# Patient Record
Sex: FEMALE | Race: WHITE | NOT HISPANIC OR LATINO | Employment: PART TIME | URBAN - NONMETROPOLITAN AREA
[De-identification: names, ages, dates, MRNs, and addresses within clinical notes are randomized per-mention and may not be internally consistent; named-entity substitution may affect disease eponyms.]

---

## 2019-11-05 ENCOUNTER — OFFICE VISIT (OUTPATIENT)
Dept: FAMILY MEDICINE CLINIC | Facility: CLINIC | Age: 69
End: 2019-11-05
Payer: COMMERCIAL

## 2019-11-05 VITALS
DIASTOLIC BLOOD PRESSURE: 80 MMHG | WEIGHT: 173 LBS | HEIGHT: 63 IN | SYSTOLIC BLOOD PRESSURE: 138 MMHG | BODY MASS INDEX: 30.65 KG/M2

## 2019-11-05 DIAGNOSIS — Z12.31 ENCOUNTER FOR SCREENING MAMMOGRAM FOR BREAST CANCER: Primary | ICD-10-CM

## 2019-11-05 DIAGNOSIS — Z23 NEEDS FLU SHOT: ICD-10-CM

## 2019-11-05 DIAGNOSIS — Z12.11 SCREENING FOR MALIGNANT NEOPLASM OF COLON: ICD-10-CM

## 2019-11-05 DIAGNOSIS — J30.1 SEASONAL ALLERGIC RHINITIS DUE TO POLLEN: ICD-10-CM

## 2019-11-05 DIAGNOSIS — I10 ESSENTIAL HYPERTENSION: ICD-10-CM

## 2019-11-05 PROBLEM — M79.641 RIGHT HAND PAIN: Chronic | Status: ACTIVE | Noted: 2019-11-05

## 2019-11-05 PROBLEM — K21.00 GASTRO-ESOPHAGEAL REFLUX DISEASE WITH ESOPHAGITIS: Chronic | Status: ACTIVE | Noted: 2019-11-05

## 2019-11-05 PROBLEM — M70.41 PREPATELLAR BURSITIS OF RIGHT KNEE: Status: ACTIVE | Noted: 2019-11-05

## 2019-11-05 PROCEDURE — G0008 ADMIN INFLUENZA VIRUS VAC: HCPCS | Performed by: FAMILY MEDICINE

## 2019-11-05 PROCEDURE — 90682 RIV4 VACC RECOMBINANT DNA IM: CPT | Performed by: FAMILY MEDICINE

## 2019-11-05 PROCEDURE — 99214 OFFICE O/P EST MOD 30 MIN: CPT | Performed by: FAMILY MEDICINE

## 2019-11-05 RX ORDER — MONTELUKAST SODIUM 10 MG/1
10 TABLET ORAL DAILY
Qty: 90 TABLET | Refills: 3 | Status: SHIPPED | OUTPATIENT
Start: 2019-11-05 | End: 2020-04-16

## 2019-11-05 RX ORDER — MONTELUKAST SODIUM 10 MG/1
10 TABLET ORAL DAILY
Refills: 2 | COMMUNITY
Start: 2019-10-16 | End: 2019-11-05 | Stop reason: SDUPTHER

## 2019-11-05 RX ORDER — TRIAMTERENE AND HYDROCHLOROTHIAZIDE 37.5; 25 MG/1; MG/1
1 TABLET ORAL DAILY
Refills: 2 | COMMUNITY
Start: 2019-10-16 | End: 2019-11-05 | Stop reason: SDUPTHER

## 2019-11-05 RX ORDER — TRIAMTERENE AND HYDROCHLOROTHIAZIDE 37.5; 25 MG/1; MG/1
1 TABLET ORAL DAILY
Qty: 90 TABLET | Refills: 3 | Status: SHIPPED | OUTPATIENT
Start: 2019-11-05 | End: 2020-12-09 | Stop reason: SDUPTHER

## 2019-11-05 NOTE — PATIENT INSTRUCTIONS
Overall seems to be doing very well  I did advise not to kneel on the right knee but prefers should probably not kneel on the left knee either because of the prepatellar bursitis  Try to avoid repetitive activity with the right thumb to help with the pain at the base of the 1st metacarpal   Will give flu shot today for routine care    Will check renal panel for history of hypertension

## 2019-11-05 NOTE — ASSESSMENT & PLAN NOTE
Overall doing well continue current regimen watch salt in diet and push for weight loss    Will check renal panel

## 2019-11-05 NOTE — PROGRESS NOTES
Assessment/Plan:    Essential hypertension  Overall doing well continue current regimen watch salt in diet and push for weight loss  Will check renal panel    Gastro-esophageal reflux disease with esophagitis  Overall doing well is currently taking the Prilosec every other day    Seasonal allergic rhinitis due to pollen  Overall doing well and is currently not having any symptoms when in [place]  Continue current regimen    Right hand pain  Try to avoid repetitive activity with the right thumb    Prepatellar bursitis of right knee  This looks to be doing well I advised her just to avoid kneeling on either knee       Diagnoses and all orders for this visit:    Encounter for screening mammogram for breast cancer  -     Mammo screening bilateral w 3d & cad; Future    Screening for malignant neoplasm of colon  -     Ambulatory referral to Gastroenterology; Future    Essential hypertension  -     triamterene-hydrochlorothiazide (MAXZIDE-25) 37 5-25 mg per tablet; Take 1 tablet by mouth daily  -     Renal function panel; Future    Seasonal allergic rhinitis due to pollen  -     montelukast (SINGULAIR) 10 mg tablet; Take 1 tablet (10 mg total) by mouth daily    Needs flu shot  -     influenza vaccine, [id number], quadrivalent, recombinant, PF, 0 5 mL, for patients 18 yr+ (FLUBLOK)    Other orders  -     Discontinue: montelukast (SINGULAIR) 10 mg tablet; Take 10 mg by mouth daily  -     Discontinue: triamterene-hydrochlorothiazide (MAXZIDE-25) 37 5-25 mg per tablet; Take 1 tablet by mouth daily          Subjective:      Patient ID: Azul Rock is a 81 y o  female  Patient has history of hypertension, allergic rhinitis and reflux esophagitis    Has been having trouble with pain at the base of the right thumb area also had fallen and had severe swelling above the right knee cap which had resolved but is still intermittently painful if kneeling on area      The following portions of the patient's history were reviewed and updated as appropriate: allergies, current medications, past medical history, past social history and problem list     Review of Systems   Constitutional: Negative for activity change, appetite change, chills, fatigue, fever and unexpected weight change  HENT: Negative for congestion, rhinorrhea, trouble swallowing and voice change  Eyes: Negative for visual disturbance  Respiratory: Negative for apnea, cough, chest tightness and shortness of breath  Cardiovascular: Negative for chest pain, palpitations and leg swelling  Gastrointestinal: Negative for abdominal distention, abdominal pain, constipation and diarrhea  Endocrine: Negative for polyuria  Genitourinary: Negative for difficulty urinating and enuresis  Musculoskeletal: Positive for arthralgias (But has tenderness if kneeling on the right knee and pain at the base of the right thumb above the wrist with certain activity)  Negative for joint swelling and myalgias  Skin: Negative for rash  Allergic/Immunologic: Positive for environmental allergies  Neurological: Negative for dizziness, weakness, light-headedness, numbness and headaches  Hematological: Negative for adenopathy  Psychiatric/Behavioral: Negative for agitation  Objective:      /80 (BP Location: Left arm, Patient Position: Sitting, Cuff Size: Standard)   Ht 5' 3" (1 6 m)   Wt 78 5 kg (173 lb)   BMI 30 65 kg/m²          Physical Exam   Constitutional: She appears well-developed and well-nourished  HENT:   Head: Normocephalic  Nose: Nose normal    Mouth/Throat: Oropharynx is clear and moist    Eyes: Conjunctivae are normal    Neck: Neck supple  No thyromegaly present  Cardiovascular: Normal rate, regular rhythm and normal heart sounds  No murmur (Rate is 66 no bruits are noted) heard  Pulmonary/Chest: Effort normal and breath sounds normal    Abdominal: Soft  She exhibits no distension and no mass  There is no tenderness     Musculoskeletal: She exhibits no edema  Arms:       Legs:  Lymphadenopathy:     She has no cervical adenopathy  Neurological: She is alert  She displays normal reflexes  Coordination normal    Skin: Skin is warm and dry  Psychiatric: She has a normal mood and affect  Nursing note and vitals reviewed

## 2019-11-05 NOTE — ASSESSMENT & PLAN NOTE
Overall doing well and is currently not having any symptoms when in [place]    Continue current regimen

## 2019-12-08 ENCOUNTER — APPOINTMENT (INPATIENT)
Dept: CT IMAGING | Facility: HOSPITAL | Age: 69
DRG: 470 | End: 2019-12-08
Payer: COMMERCIAL

## 2019-12-08 ENCOUNTER — HOSPITAL ENCOUNTER (INPATIENT)
Facility: HOSPITAL | Age: 69
LOS: 3 days | Discharge: HOME HEALTH | DRG: 470 | End: 2019-12-11
Attending: FAMILY MEDICINE | Admitting: FAMILY MEDICINE
Payer: COMMERCIAL

## 2019-12-08 ENCOUNTER — APPOINTMENT (EMERGENCY)
Dept: RADIOLOGY | Facility: HOSPITAL | Age: 69
DRG: 470 | End: 2019-12-08
Payer: COMMERCIAL

## 2019-12-08 ENCOUNTER — APPOINTMENT (INPATIENT)
Dept: RADIOLOGY | Facility: HOSPITAL | Age: 69
DRG: 470 | End: 2019-12-08
Payer: COMMERCIAL

## 2019-12-08 DIAGNOSIS — D62 ACUTE BLOOD LOSS ANEMIA: ICD-10-CM

## 2019-12-08 DIAGNOSIS — S72.001A CLOSED FRACTURE OF RIGHT HIP, INITIAL ENCOUNTER (HCC): ICD-10-CM

## 2019-12-08 DIAGNOSIS — S72.001A CLOSED FRACTURE OF NECK OF RIGHT FEMUR, INITIAL ENCOUNTER (HCC): Primary | ICD-10-CM

## 2019-12-08 PROBLEM — Z87.81 S/P RIGHT HIP FRACTURE: Status: ACTIVE | Noted: 2019-12-08

## 2019-12-08 PROBLEM — Z87.81 S/P RIGHT HIP FRACTURE: Status: RESOLVED | Noted: 2019-12-08 | Resolved: 2019-12-08

## 2019-12-08 LAB
ABO GROUP BLD: NORMAL
ALBUMIN SERPL BCP-MCNC: 4 G/DL (ref 3.5–5)
ALP SERPL-CCNC: 133 U/L (ref 46–116)
ALT SERPL W P-5'-P-CCNC: 44 U/L (ref 12–78)
ANION GAP SERPL CALCULATED.3IONS-SCNC: 8 MMOL/L (ref 4–13)
APTT PPP: 26 SECONDS (ref 23–37)
AST SERPL W P-5'-P-CCNC: 30 U/L (ref 5–45)
BASOPHILS # BLD AUTO: 0.03 THOUSANDS/ΜL (ref 0–0.1)
BASOPHILS NFR BLD AUTO: 0 % (ref 0–1)
BILIRUB SERPL-MCNC: 0.32 MG/DL (ref 0.2–1)
BILIRUB UR QL STRIP: NEGATIVE
BLD GP AB SCN SERPL QL: NEGATIVE
BUN SERPL-MCNC: 21 MG/DL (ref 5–25)
CALCIUM SERPL-MCNC: 9.5 MG/DL (ref 8.3–10.1)
CHLORIDE SERPL-SCNC: 99 MMOL/L (ref 100–108)
CLARITY UR: CLEAR
CO2 SERPL-SCNC: 30 MMOL/L (ref 21–32)
COLOR UR: YELLOW
CREAT SERPL-MCNC: 0.86 MG/DL (ref 0.6–1.3)
EOSINOPHIL # BLD AUTO: 0.07 THOUSAND/ΜL (ref 0–0.61)
EOSINOPHIL NFR BLD AUTO: 1 % (ref 0–6)
ERYTHROCYTE [DISTWIDTH] IN BLOOD BY AUTOMATED COUNT: 11.7 % (ref 11.6–15.1)
GFR SERPL CREATININE-BSD FRML MDRD: 70 ML/MIN/1.73SQ M
GLUCOSE SERPL-MCNC: 110 MG/DL (ref 65–140)
GLUCOSE UR STRIP-MCNC: NEGATIVE MG/DL
HCT VFR BLD AUTO: 40.8 % (ref 34.8–46.1)
HGB BLD-MCNC: 14.1 G/DL (ref 11.5–15.4)
HGB UR QL STRIP.AUTO: NEGATIVE
IMM GRANULOCYTES # BLD AUTO: 0.09 THOUSAND/UL (ref 0–0.2)
IMM GRANULOCYTES NFR BLD AUTO: 1 % (ref 0–2)
INR PPP: 0.97 (ref 0.84–1.19)
KETONES UR STRIP-MCNC: NEGATIVE MG/DL
LEUKOCYTE ESTERASE UR QL STRIP: NEGATIVE
LYMPHOCYTES # BLD AUTO: 1.97 THOUSANDS/ΜL (ref 0.6–4.47)
LYMPHOCYTES NFR BLD AUTO: 24 % (ref 14–44)
MCH RBC QN AUTO: 29.9 PG (ref 26.8–34.3)
MCHC RBC AUTO-ENTMCNC: 34.6 G/DL (ref 31.4–37.4)
MCV RBC AUTO: 87 FL (ref 82–98)
MONOCYTES # BLD AUTO: 0.35 THOUSAND/ΜL (ref 0.17–1.22)
MONOCYTES NFR BLD AUTO: 4 % (ref 4–12)
NEUTROPHILS # BLD AUTO: 5.67 THOUSANDS/ΜL (ref 1.85–7.62)
NEUTS SEG NFR BLD AUTO: 70 % (ref 43–75)
NITRITE UR QL STRIP: NEGATIVE
NRBC BLD AUTO-RTO: 0 /100 WBCS
PH UR STRIP.AUTO: 5 [PH]
PLATELET # BLD AUTO: 285 THOUSANDS/UL (ref 149–390)
PLATELET # BLD AUTO: 286 THOUSANDS/UL (ref 149–390)
PMV BLD AUTO: 10.4 FL (ref 8.9–12.7)
PMV BLD AUTO: 10.5 FL (ref 8.9–12.7)
POTASSIUM SERPL-SCNC: 3.3 MMOL/L (ref 3.5–5.3)
PROT SERPL-MCNC: 7.8 G/DL (ref 6.4–8.2)
PROT UR STRIP-MCNC: NEGATIVE MG/DL
PROTHROMBIN TIME: 12.9 SECONDS (ref 11.6–14.5)
RBC # BLD AUTO: 4.71 MILLION/UL (ref 3.81–5.12)
RH BLD: POSITIVE
SODIUM SERPL-SCNC: 137 MMOL/L (ref 136–145)
SP GR UR STRIP.AUTO: 1.02 (ref 1–1.03)
SPECIMEN EXPIRATION DATE: NORMAL
UROBILINOGEN UR QL STRIP.AUTO: 0.2 E.U./DL
WBC # BLD AUTO: 8.18 THOUSAND/UL (ref 4.31–10.16)

## 2019-12-08 PROCEDURE — 99285 EMERGENCY DEPT VISIT HI MDM: CPT | Performed by: EMERGENCY MEDICINE

## 2019-12-08 PROCEDURE — 70450 CT HEAD/BRAIN W/O DYE: CPT

## 2019-12-08 PROCEDURE — 86900 BLOOD TYPING SEROLOGIC ABO: CPT | Performed by: PHYSICIAN ASSISTANT

## 2019-12-08 PROCEDURE — 36415 COLL VENOUS BLD VENIPUNCTURE: CPT | Performed by: PHYSICIAN ASSISTANT

## 2019-12-08 PROCEDURE — 93005 ELECTROCARDIOGRAM TRACING: CPT

## 2019-12-08 PROCEDURE — 72170 X-RAY EXAM OF PELVIS: CPT

## 2019-12-08 PROCEDURE — 86901 BLOOD TYPING SEROLOGIC RH(D): CPT | Performed by: PHYSICIAN ASSISTANT

## 2019-12-08 PROCEDURE — 71046 X-RAY EXAM CHEST 2 VIEWS: CPT

## 2019-12-08 PROCEDURE — 99285 EMERGENCY DEPT VISIT HI MDM: CPT

## 2019-12-08 PROCEDURE — 85610 PROTHROMBIN TIME: CPT | Performed by: PHYSICIAN ASSISTANT

## 2019-12-08 PROCEDURE — 86850 RBC ANTIBODY SCREEN: CPT | Performed by: PHYSICIAN ASSISTANT

## 2019-12-08 PROCEDURE — 85730 THROMBOPLASTIN TIME PARTIAL: CPT | Performed by: PHYSICIAN ASSISTANT

## 2019-12-08 PROCEDURE — 99222 1ST HOSP IP/OBS MODERATE 55: CPT | Performed by: FAMILY MEDICINE

## 2019-12-08 PROCEDURE — 73552 X-RAY EXAM OF FEMUR 2/>: CPT

## 2019-12-08 PROCEDURE — 81003 URINALYSIS AUTO W/O SCOPE: CPT | Performed by: PHYSICIAN ASSISTANT

## 2019-12-08 PROCEDURE — 73700 CT LOWER EXTREMITY W/O DYE: CPT

## 2019-12-08 PROCEDURE — 80053 COMPREHEN METABOLIC PANEL: CPT | Performed by: PHYSICIAN ASSISTANT

## 2019-12-08 PROCEDURE — 85025 COMPLETE CBC W/AUTO DIFF WBC: CPT | Performed by: PHYSICIAN ASSISTANT

## 2019-12-08 PROCEDURE — 85049 AUTOMATED PLATELET COUNT: CPT | Performed by: FAMILY MEDICINE

## 2019-12-08 RX ORDER — CHOLECALCIFEROL (VITAMIN D3) 125 MCG
500 CAPSULE ORAL DAILY
COMMUNITY

## 2019-12-08 RX ORDER — OMEPRAZOLE 20 MG/1
20 CAPSULE, DELAYED RELEASE ORAL DAILY
COMMUNITY
End: 2021-01-18

## 2019-12-08 RX ORDER — MONTELUKAST SODIUM 10 MG/1
10 TABLET ORAL DAILY
Status: DISCONTINUED | OUTPATIENT
Start: 2019-12-08 | End: 2019-12-11 | Stop reason: HOSPADM

## 2019-12-08 RX ORDER — MAGNESIUM HYDROXIDE/ALUMINUM HYDROXICE/SIMETHICONE 120; 1200; 1200 MG/30ML; MG/30ML; MG/30ML
30 SUSPENSION ORAL EVERY 6 HOURS PRN
Status: DISCONTINUED | OUTPATIENT
Start: 2019-12-08 | End: 2019-12-11 | Stop reason: HOSPADM

## 2019-12-08 RX ORDER — OXYCODONE HYDROCHLORIDE 5 MG/1
5 TABLET ORAL EVERY 4 HOURS PRN
Status: DISCONTINUED | OUTPATIENT
Start: 2019-12-08 | End: 2019-12-11 | Stop reason: HOSPADM

## 2019-12-08 RX ORDER — MONTELUKAST SODIUM 10 MG/1
10 TABLET ORAL DAILY
Status: DISCONTINUED | OUTPATIENT
Start: 2019-12-08 | End: 2019-12-08

## 2019-12-08 RX ORDER — ACETAMINOPHEN 325 MG/1
975 TABLET ORAL EVERY 8 HOURS SCHEDULED
Status: DISCONTINUED | OUTPATIENT
Start: 2019-12-08 | End: 2019-12-11 | Stop reason: HOSPADM

## 2019-12-08 RX ORDER — OXYCODONE HYDROCHLORIDE 5 MG/1
2.5 TABLET ORAL EVERY 4 HOURS PRN
Status: DISCONTINUED | OUTPATIENT
Start: 2019-12-08 | End: 2019-12-11 | Stop reason: HOSPADM

## 2019-12-08 RX ORDER — SODIUM CHLORIDE 9 MG/ML
75 INJECTION, SOLUTION INTRAVENOUS CONTINUOUS
Status: DISCONTINUED | OUTPATIENT
Start: 2019-12-08 | End: 2019-12-09

## 2019-12-08 RX ORDER — POTASSIUM CHLORIDE 20 MEQ/1
40 TABLET, EXTENDED RELEASE ORAL ONCE
Status: COMPLETED | OUTPATIENT
Start: 2019-12-08 | End: 2019-12-08

## 2019-12-08 RX ADMIN — MONTELUKAST 10 MG: 10 TABLET, FILM COATED ORAL at 20:21

## 2019-12-08 RX ADMIN — ENOXAPARIN SODIUM 40 MG: 40 INJECTION SUBCUTANEOUS at 17:52

## 2019-12-08 RX ADMIN — SODIUM CHLORIDE 75 ML/HR: 0.9 INJECTION, SOLUTION INTRAVENOUS at 17:52

## 2019-12-08 RX ADMIN — ACETAMINOPHEN 975 MG: 325 TABLET ORAL at 17:51

## 2019-12-08 RX ADMIN — OXYCODONE HYDROCHLORIDE 2.5 MG: 5 TABLET ORAL at 20:21

## 2019-12-08 RX ADMIN — POTASSIUM CHLORIDE 40 MEQ: 1500 TABLET, EXTENDED RELEASE ORAL at 17:51

## 2019-12-08 NOTE — H&P
H&P- Kervin Flores 1950, 81 y o  female MRN: [id number]    Unit/Bed#: -01 Encounter: [id number]    Primary Care Provider: Akhil Vang MD   Date and time admitted to hospital: 12/8/2019 12:59 PM        * Closed right hip fracture [facility])  Assessment & Plan  · Patient had a mechanical fall today  · Right hip fracture-femoral neck  · Emergency room discussed with Orthopedics-NPO after midnight  · Pain control  · Orthopedic consultation  · For surgical intervention tomorrow    Essential hypertension  Assessment & Plan  · Patient is on  maxide as outpatient  · Will hold for the time being since the patient is going to  OR  · Monitor blood pressure      VTE Prophylaxis: Enoxaparin (Lovenox)  / sequential compression device   Code Status: full code  POLST: POLST form is not discussed and not completed at this time  Discussion with family: patient    Anticipated Length of Stay:  Patient will be admitted on an Inpatient basis with an anticipated length of stay of  >2 midnights  Justification for Hospital Stay:     Total Time for Visit, including Counseling / Coordination of Care: 70  minutes  Greater than 50% of this total time spent on direct patient counseling and coordination of care  Chief Complaint:       History of Present Illness:    Kervin Flores is a 81 y o  female who presents with right hip pain after a fall  Patient reported that she was tick rating chest and she was up and a ladder  She was coming down from the ladder and she lost balance  She thinks she may have missed a step of the ladder and then she fell down to the right hip  Showed reported that she fell down onto the tired floor and ever since she has been having pain in her right hip  She is not able to ambulate  Denies any loss of consciousness or head injury  No shortness of breath no chest pain before the fall     No dizziness or lightheadedness     Emergency room had an x-ray showing the presence of right hip fracture and discussed with Orthopedics who recommended admission to medicine and keeping NPO after midnight  Review of Systems:    Review of Systems   Constitutional: Positive for activity change  HENT: Negative  Respiratory: Negative  Cardiovascular: Negative  Gastrointestinal: Negative  Genitourinary: Negative  Musculoskeletal: Positive for arthralgias and gait problem  Hematological: Negative  Psychiatric/Behavioral: Negative  Past Medical and Surgical History:     Past Medical History:   Diagnosis Date    Essential hypertension     Hyperlipidemia        Past Surgical History:   Procedure Laterality Date    BASAL CELL CARCINOMA EXCISION      BREAST SURGERY      Biopsy     HERNIA REPAIR         Meds/Allergies:    Prior to Admission medications    Medication Sig Start Date End Date Taking? Authorizing Provider   montelukast (SINGULAIR) 10 mg tablet Take 1 tablet (10 mg total) by mouth daily 11/5/19  Yes Abby Fernandez MD   triamterene-hydrochlorothiazide [facility]) 37 5-25 mg per tablet Take 1 tablet by mouth daily 11/5/19  Yes Abby Fernandez MD     I have reviewed home medications with patient personally  Allergies:    Allergies   Allergen Reactions    Codeine Hives       Social History:     Marital Status: /Civil Union   Occupation: retired   Patient Pre-hospital Living Situation:   Patient Pre-hospital Level of Mobility:   Patient Pre-hospital Diet Restrictions:   Substance Use History:   Social History     Substance and Sexual Activity   Alcohol Use Yes    Comment: Occasionally      Social History     Tobacco Use   Smoking Status Never Smoker   Smokeless Tobacco Never Used     Social History     Substance and Sexual Activity   Drug Use Never       Family History:    Family History   Problem Relation Age of Onset    Colon cancer Paternal Grandmother     Diabetes Mother     Hypertension Mother     Coronary artery disease Mother     Coronary artery disease Father        Physical Exam:     Vitals:   Blood Pressure: 170/94 (12/08/19 1548)  Pulse: 88 (12/08/19 1548)  Temperature: 98 2 °F (36 8 °C) (12/08/19 1548)  Temp Source: Oral (12/08/19 1302)  Respirations: 18 (12/08/19 1548)  Height: 5' 5" (165 1 cm) (12/08/19 1302)  Weight - Scale: 78 3 kg (172 lb 9 9 oz) (12/08/19 1302)  SpO2: 94 % (12/08/19 1548)    Physical Exam   Constitutional: No distress  HENT:   Head: Normocephalic and atraumatic  Eyes: Right eye exhibits no discharge  Left eye exhibits no discharge  Neck: No JVD present  Cardiovascular: Normal rate  No murmur heard  Pulmonary/Chest: No stridor  No respiratory distress  Abdominal: Soft  Bowel sounds are normal  She exhibits no distension  Musculoskeletal: Normal range of motion  She exhibits tenderness and deformity  She exhibits no edema  Tenderness on palpation of the right hip  Right lower extremity shorter   Neurological: No cranial nerve deficit  Skin: Skin is warm  She is not diaphoretic  Additional Data:     Lab Results: I have personally reviewed pertinent reports  Results from last 7 days   Lab Units 12/08/19  1403   WBC Thousand/uL 8 18   HEMOGLOBIN g/dL 14 1   HEMATOCRIT % 40 8   PLATELETS Thousands/uL 285   NEUTROS PCT % 70   LYMPHS PCT % 24   MONOS PCT % 4   EOS PCT % 1     Results from last 7 days   Lab Units 12/08/19  1403   SODIUM mmol/L 137   POTASSIUM mmol/L 3 3*   CHLORIDE mmol/L 99*   CO2 mmol/L 30   BUN mg/dL 21   CREATININE mg/dL 0 86   ANION GAP mmol/L 8   CALCIUM mg/dL 9 5   ALBUMIN g/dL 4 0   TOTAL BILIRUBIN mg/dL 0 32   ALK PHOS U/L 133*   ALT U/L 44   AST U/L 30   GLUCOSE RANDOM mg/dL 110     Results from last 7 days   Lab Units 12/08/19  1403   INR  0 97                   Imaging: I have personally reviewed pertinent reports  CT head without contrast   Final Result by Alysa Evangelista MD (12/08 7964)      No acute intracranial abnormality                    Workstation performed: [id number]         CT hip right without contrast   Final Result by Sara Macedo MD (12/08 1440)      Comminuted slightly impacted right subcapital hip fracture including mild anterior angulation at the fracture site  No additional fracture identified  Workstation performed: [id number]         XR femur 2 views RIGHT    (Results Pending)   XR pelvis ap only 1 or 2 vw    (Results Pending)   XR chest 2 views    (Results Pending)       EKG, Pathology, and Other Studies Reviewed on Admission:   · EKG:     Allscripts / Epic Records Reviewed: Yes     ** Please Note: This note has been constructed using a voice recognition system   **

## 2019-12-08 NOTE — ED PROVIDER NOTES
History  Chief Complaint   Patient presents with    Hip Injury     Pt was stepping down off a ladder when she fell on her R hip, -headstrike -LOC -thinners  Pt was unable to get up off floor on her own and was unable to bear weight on the R leg  82-year-old female with a history of hypertension presents to the emergency department for evaluation of a fall and right hip pain  The patient reports that just prior to arrival, she was at [facility] and decorating whenever she had just stepped off a ladder, and was turning around and had a mechanical fall  The patient reports that she did not develop any lightheadedness, dizziness, chest pain, palpitations, shortness of breath, nausea, abdominal pain, or any other symptoms preceding her fall  The patient states that her fall was strictly mechanical   The patient fell onto her right side and believes that she landed on her right hip  She denies hitting her head and denies any headache, vision changes, neck pain, abdominal pain, nausea, vomiting, loss of bladder or bowel control, paresthesias, weakness, or any other symptoms since the fall  She did not try to walk after the incident and the ambulance was called immediately, bring her to the emergency department for further evaluation  Since the fall, the patient has noted some pain around her right hip  She denies any other complaints or symptoms at this time  History provided by:  Patient   used: No    Fall   Mechanism of injury: fall    Injury location:  Pelvis  Pelvic injury location:  R hip  Incident location: [facility]  Time since incident:  2 hours  Arrived directly from scene: yes    Fall:     Fall occurred:  Walking    Height of fall:  From standing    Impact surface:  Hard floor    Point of impact: R side      Entrapped after fall: no    Suspicion of alcohol use: no    Suspicion of drug use: no    Tetanus status:  Unknown  Prior to arrival data:     Bystander interventions:  None Patient ambulatory at scene: no      Blood loss:  None    Responsiveness at scene:  Alert    Orientation at scene:  Person, place, situation and time    Loss of consciousness: no      Amnesic to event: no      Airway interventions:  None  Associated symptoms: no abdominal pain, no back pain, no chest pain, no headaches, no nausea, no neck pain and no vomiting        Prior to Admission Medications   Prescriptions Last Dose Informant Patient Reported? Taking?   montelukast (SINGULAIR) 10 mg tablet 12/7/2019 at Unknown time  No Yes   Sig: Take 1 tablet (10 mg total) by mouth daily   triamterene-hydrochlorothiazide (MAXZIDE-25) 37 5-25 mg per tablet 12/7/2019 at Unknown time  No Yes   Sig: Take 1 tablet by mouth daily      Facility-Administered Medications: None       Past Medical History:   Diagnosis Date    Essential hypertension     Hyperlipidemia        Past Surgical History:   Procedure Laterality Date    BASAL CELL CARCINOMA EXCISION      BREAST SURGERY      Biopsy     HERNIA REPAIR         Family History   Problem Relation Age of Onset    Colon cancer Paternal Grandmother     Diabetes Mother     Hypertension Mother     Coronary artery disease Mother     Coronary artery disease Father      I have reviewed and agree with the history as documented  Social History     Tobacco Use    Smoking status: Never Smoker    Smokeless tobacco: Never Used   Substance Use Topics    Alcohol use: Yes     Comment: Occasionally     Drug use: Never        Review of Systems   Constitutional: Negative for chills and fever  HENT: Negative for congestion and rhinorrhea  Eyes: Negative for photophobia and visual disturbance  Respiratory: Negative for chest tightness and shortness of breath  Cardiovascular: Negative for chest pain and palpitations  Gastrointestinal: Negative for abdominal pain, nausea and vomiting  Genitourinary: Negative for difficulty urinating and dysuria          Negative for urinary incontinence   Musculoskeletal: Positive for arthralgias (Right hip pain)  Negative for back pain, neck pain and neck stiffness  Skin: Negative for rash and wound  Neurological: Negative for dizziness, weakness, light-headedness, numbness and headaches  Physical Exam  Physical Exam   Constitutional: She is oriented to person, place, and time  She appears well-developed and well-nourished  She appears distressed (Mild distress)  HENT:   Head: Normocephalic and atraumatic  Mouth/Throat: Oropharynx is clear and moist  No oropharyngeal exudate  Eyes: Pupils are equal, round, and reactive to light  EOM are normal    Neck: Normal range of motion  Neck supple  No cervical spine tenderness   Cardiovascular: Normal rate and regular rhythm  No murmur heard  Pulmonary/Chest: Effort normal and breath sounds normal  No stridor  No respiratory distress  She has no wheezes  She has no rales  She exhibits no tenderness  Abdominal: Soft  She exhibits no distension  There is no tenderness  There is no guarding  Musculoskeletal: She exhibits no tenderness or deformity  There is no reproducible tenderness of the greater trochanter of the right hip  There is no pain through flexion of the 1st 45° of the right hip, however beyond 45°, the patient does experience some pain  She also has subjective pain with internal and external rotation  The right lower extremity is not shortened or rotated at rest   Neurovascular status intact, pedal pulses 2+  The patient demonstrates good effort with active hip flexion, however this is somewhat limited by pain   Neurological: She is alert and oriented to person, place, and time  She has normal strength  No cranial nerve deficit or sensory deficit  She exhibits normal muscle tone  GCS eye subscore is 4  GCS verbal subscore is 5  GCS motor subscore is 6  Skin: Skin is warm  No rash noted  She is not diaphoretic  No erythema     Psychiatric: She has a normal mood and affect   Her behavior is normal        Vital Signs  ED Triage Vitals [12/08/19 1302]   Temperature Pulse Respirations Blood Pressure SpO2   97 8 °F (36 6 °C) 72 20 (!) 171/90 98 %      Temp Source Heart Rate Source Patient Position - Orthostatic VS BP Location FiO2 (%)   Oral Monitor Lying Right arm --      Pain Score       Worst Possible Pain           Vitals:    12/08/19 1302   BP: (!) 171/90   Pulse: 72   Patient Position - Orthostatic VS: Lying         Visual Acuity      ED Medications  Medications   potassium chloride (K-DUR,KLOR-CON) CR tablet 40 mEq (has no administration in time range)       Diagnostic Studies  Results Reviewed     Procedure Component Value Units Date/Time    Comprehensive metabolic panel [[id number]]  (Abnormal) Collected:  12/08/19 1403    Lab Status:  Final result Specimen:  Blood from Arm, Right Updated:  12/08/19 1428     Sodium 137 mmol/L      Potassium 3 3 mmol/L      Chloride 99 mmol/L      CO2 30 mmol/L      ANION GAP 8 mmol/L      BUN 21 mg/dL      Creatinine 0 86 mg/dL      Glucose 110 mg/dL      Calcium 9 5 mg/dL      AST 30 U/L      ALT 44 U/L      Alkaline Phosphatase 133 U/L      Total Protein 7 8 g/dL      Albumin 4 0 g/dL      Total Bilirubin 0 32 mg/dL      eGFR 70 ml/min/1 73sq m     Narrative:       [place] guidelines for Chronic Kidney Disease (CKD):     Stage 1 with normal or high GFR (GFR > 90 mL/min/1 73 square meters)    Stage 2 Mild CKD (GFR = 60-89 mL/min/1 73 square meters)    Stage 3A Moderate CKD (GFR = 45-59 mL/min/1 73 square meters)    Stage 3B Moderate CKD (GFR = 30-44 mL/min/1 73 square meters)    Stage 4 Severe CKD (GFR = 15-29 mL/min/1 73 square meters)    Stage 5 End Stage CKD (GFR <15 mL/min/1 73 square meters)  Note: GFR calculation is accurate only with a steady state creatinine    Protime-INR [[id number]]  (Normal) Collected:  12/08/19 1403    Lab Status:  Final result Specimen:  Blood from Arm, Right Updated:  12/08/19 1421     Protime 12 9 seconds      INR 0 97    APTT [[id number]]  (Normal) Collected:  12/08/19 1403    Lab Status:  Final result Specimen:  Blood from Arm, Right Updated:  12/08/19 1421     PTT 26 seconds     CBC and differential [[id number]] Collected:  12/08/19 1403    Lab Status:  Final result Specimen:  Blood from Arm, Right Updated:  12/08/19 1408     WBC 8 18 Thousand/uL      RBC 4 71 Million/uL      Hemoglobin 14 1 g/dL      Hematocrit 40 8 %      MCV 87 fL      MCH 29 9 pg      MCHC 34 6 g/dL      RDW 11 7 %      MPV 10 4 fL      Platelets 509 Thousands/uL      nRBC 0 /100 WBCs      Neutrophils Relative 70 %      Immat GRANS % 1 %      Lymphocytes Relative 24 %      Monocytes Relative 4 %      Eosinophils Relative 1 %      Basophils Relative 0 %      Neutrophils Absolute 5 67 Thousands/µL      Immature Grans Absolute 0 09 Thousand/uL      Lymphocytes Absolute 1 97 Thousands/µL      Monocytes Absolute 0 35 Thousand/µL      Eosinophils Absolute 0 07 Thousand/µL      Basophils Absolute 0 03 Thousands/µL     UA w Reflex to Microscopic w Reflex to Culture [[id number]]     Lab Status:  No result Specimen:  Urine                  XR femur 2 views RIGHT    (Results Pending)   XR pelvis ap only 1 or 2 vw    (Results Pending)   CT hip right without contrast    (Results Pending)   XR chest 2 views    (Results Pending)   CT head without contrast    (Results Pending)              Procedures  Procedures         ED Course  ED Course as of Dec 08 1430   Sun Dec 08, 2019   156 24-year-old female presents with the above HPI  X-rays of the femur and pelvis including her hip were ordered  There is a femoral neck fracture  I discussed this with the orthopedist, Dr Mckinley Suresh, who recommended admission to general medicine, NPO after midnight, and they will see her tomorrow    I discussed this with the patient and family, they are in agreement with this plan      [id number] Orthopedics recommended CT without contrast with 3D reconstructed imaging of the fracture  Medical clearance labs were place, as was chest x-ray as per hospitalist request   Inpatient admission order placed  MDM  Number of Diagnoses or Management Options  Closed fracture of neck of right femur, initial encounter [facility]): new and requires workup     Amount and/or Complexity of Data Reviewed  Tests in the radiology section of CPT®: ordered and reviewed  Obtain history from someone other than the patient: yes (EMS)  Review and summarize past medical records: yes  Discuss the patient with other providers: yes (Dr Nora Nelson, Dr Augustus Councilman)  Independent visualization of images, tracings, or specimens: yes    Risk of Complications, Morbidity, and/or Mortality  Presenting problems: moderate  Diagnostic procedures: low  Management options: moderate    Patient Progress  Patient progress: stable        Disposition  Final diagnoses:   Closed fracture of neck of right femur, initial encounter ([facility] )     Time reflects when diagnosis was documented in both MDM as applicable and the Disposition within this note     Time User Action Codes Description Comment    12/8/2019  2:00 PM Yimi Going Add [[id number] 001A] Closed fracture of neck of right femur, initial encounter [facility])       ED Disposition     ED Disposition Condition Date/Time Comment    Admit Stable Sun Dec 8, 2019  1:54 PM Case was discussed with Dr Nora Nelson and the patient's admission status was agreed to be Admission Status: inpatient status to the service of Dr Nora Nelson   Follow-up Information    None         Patient's Medications   Discharge Prescriptions    No medications on file     No discharge procedures on file      ED Provider  Electronically Signed by           Hubert Hsu PA-C  12/08/19 8145

## 2019-12-08 NOTE — ASSESSMENT & PLAN NOTE
· Patient had a mechanical fall today  · Right hip fracture-femoral neck  · Emergency room discussed with Orthopedics-NPO after midnight  · Pain control  · Orthopedic consultation  · For surgical intervention tomorrow

## 2019-12-08 NOTE — ED ATTENDING ATTESTATION
12/8/2019  I, Marielle Rodriguez MD, saw and evaluated the patient  I have discussed the patient with the resident/non-physician practitioner and agree with the resident's/non-physician practitioner's findings, Plan of Care, and MDM as documented in the resident's/non-physician practitioner's note, except where noted  All available labs and Radiology studies were reviewed  I was present for key portions of any procedure(s) performed by the resident/non-physician practitioner and I was immediately available to provide assistance  At this point I agree with the current assessment done in the Emergency Department  I have conducted an independent evaluation of this patient a history and physical is as follows:    ED Course     Patient fell today  Denies any head injury  Complains of right hip pain  History of previous fractures to the hip  On exam the patient is awake alert  Neck is supple and nontender  Cleared clinically  Lungs are clear to auscultation  Chest is nontender  Abdomen soft nontender good bowel sounds  The right lower extremity has a good dorsalis pedis pulse  No obvious deformity or shortening noted  X-ray of the hip shows a right subcapital fracture      Critical Care Time  Procedures

## 2019-12-08 NOTE — ASSESSMENT & PLAN NOTE
· Patient is on  maxide as outpatient  · Will hold for the time being since the patient is going to  OR  · Monitor blood pressure

## 2019-12-08 NOTE — ED NOTES
and pt wishing to pray at this time and then will transport pt to admission room     Margarito Bosch RN  12/08/19 6741

## 2019-12-09 ENCOUNTER — APPOINTMENT (INPATIENT)
Dept: RADIOLOGY | Facility: HOSPITAL | Age: 69
DRG: 470 | End: 2019-12-09
Payer: COMMERCIAL

## 2019-12-09 ENCOUNTER — ANESTHESIA (INPATIENT)
Dept: PERIOP | Facility: HOSPITAL | Age: 69
DRG: 470 | End: 2019-12-09
Payer: COMMERCIAL

## 2019-12-09 ENCOUNTER — ANESTHESIA EVENT (INPATIENT)
Dept: PERIOP | Facility: HOSPITAL | Age: 69
DRG: 470 | End: 2019-12-09
Payer: COMMERCIAL

## 2019-12-09 LAB
ANION GAP SERPL CALCULATED.3IONS-SCNC: 8 MMOL/L (ref 4–13)
BUN SERPL-MCNC: 19 MG/DL (ref 5–25)
CALCIUM SERPL-MCNC: 8.8 MG/DL (ref 8.3–10.1)
CHLORIDE SERPL-SCNC: 103 MMOL/L (ref 100–108)
CO2 SERPL-SCNC: 29 MMOL/L (ref 21–32)
CREAT SERPL-MCNC: 0.87 MG/DL (ref 0.6–1.3)
ERYTHROCYTE [DISTWIDTH] IN BLOOD BY AUTOMATED COUNT: 11.8 % (ref 11.6–15.1)
GFR SERPL CREATININE-BSD FRML MDRD: 69 ML/MIN/1.73SQ M
GLUCOSE SERPL-MCNC: 109 MG/DL (ref 65–140)
HCT VFR BLD AUTO: 38.8 % (ref 34.8–46.1)
HGB BLD-MCNC: 13.5 G/DL (ref 11.5–15.4)
MCH RBC QN AUTO: 30.5 PG (ref 26.8–34.3)
MCHC RBC AUTO-ENTMCNC: 34.8 G/DL (ref 31.4–37.4)
MCV RBC AUTO: 88 FL (ref 82–98)
PLATELET # BLD AUTO: 249 THOUSANDS/UL (ref 149–390)
PMV BLD AUTO: 10.6 FL (ref 8.9–12.7)
POTASSIUM SERPL-SCNC: 3.7 MMOL/L (ref 3.5–5.3)
RBC # BLD AUTO: 4.43 MILLION/UL (ref 3.81–5.12)
SODIUM SERPL-SCNC: 140 MMOL/L (ref 136–145)
WBC # BLD AUTO: 9.35 THOUSAND/UL (ref 4.31–10.16)

## 2019-12-09 PROCEDURE — 99223 1ST HOSP IP/OBS HIGH 75: CPT | Performed by: ORTHOPAEDIC SURGERY

## 2019-12-09 PROCEDURE — C1776 JOINT DEVICE (IMPLANTABLE): HCPCS | Performed by: ORTHOPAEDIC SURGERY

## 2019-12-09 PROCEDURE — 85027 COMPLETE CBC AUTOMATED: CPT | Performed by: FAMILY MEDICINE

## 2019-12-09 PROCEDURE — 27236 TREAT THIGH FRACTURE: CPT | Performed by: ORTHOPAEDIC SURGERY

## 2019-12-09 PROCEDURE — 27236 TREAT THIGH FRACTURE: CPT | Performed by: PHYSICIAN ASSISTANT

## 2019-12-09 PROCEDURE — 80048 BASIC METABOLIC PNL TOTAL CA: CPT | Performed by: FAMILY MEDICINE

## 2019-12-09 PROCEDURE — 99232 SBSQ HOSP IP/OBS MODERATE 35: CPT | Performed by: FAMILY MEDICINE

## 2019-12-09 PROCEDURE — 0SRR0JZ REPLACEMENT OF RIGHT HIP JOINT, FEMORAL SURFACE WITH SYNTHETIC SUBSTITUTE, OPEN APPROACH: ICD-10-PCS | Performed by: ORTHOPAEDIC SURGERY

## 2019-12-09 PROCEDURE — 73501 X-RAY EXAM HIP UNI 1 VIEW: CPT

## 2019-12-09 DEVICE — MODULAR CATHCART TAPERED SPACER 12/14 TAPER +5MM: Type: IMPLANTABLE DEVICE | Site: HIP | Status: FUNCTIONAL

## 2019-12-09 DEVICE — SUMMIT FEMORAL STEM 12/14 TAPER TAPER ED W/POROCOAT SIZE 2 STD 130MM
Type: IMPLANTABLE DEVICE | Site: HIP | Status: FUNCTIONAL
Brand: SUMMIT POROCOAT

## 2019-12-09 DEVICE — MODULAR CATHCART FRACTURE HEAD HIP BALL 49MM OD +5MM: Type: IMPLANTABLE DEVICE | Site: HIP | Status: FUNCTIONAL

## 2019-12-09 RX ORDER — LIDOCAINE HYDROCHLORIDE AND EPINEPHRINE 10; 10 MG/ML; UG/ML
INJECTION, SOLUTION INFILTRATION; PERINEURAL AS NEEDED
Status: DISCONTINUED | OUTPATIENT
Start: 2019-12-09 | End: 2019-12-09 | Stop reason: HOSPADM

## 2019-12-09 RX ORDER — FENTANYL CITRATE/PF 50 MCG/ML
25 SYRINGE (ML) INJECTION
Status: DISCONTINUED | OUTPATIENT
Start: 2019-12-09 | End: 2019-12-09

## 2019-12-09 RX ORDER — CHLORHEXIDINE GLUCONATE 4 G/100ML
SOLUTION TOPICAL DAILY PRN
Status: DISCONTINUED | OUTPATIENT
Start: 2019-12-09 | End: 2019-12-09

## 2019-12-09 RX ORDER — HYDROMORPHONE HCL/PF 1 MG/ML
0.5 SYRINGE (ML) INJECTION
Status: DISCONTINUED | OUTPATIENT
Start: 2019-12-09 | End: 2019-12-09

## 2019-12-09 RX ORDER — BUPIVACAINE HYDROCHLORIDE 7.5 MG/ML
INJECTION, SOLUTION INTRASPINAL AS NEEDED
Status: DISCONTINUED | OUTPATIENT
Start: 2019-12-09 | End: 2019-12-09 | Stop reason: SURG

## 2019-12-09 RX ORDER — EPHEDRINE SULFATE 50 MG/ML
INJECTION INTRAVENOUS AS NEEDED
Status: DISCONTINUED | OUTPATIENT
Start: 2019-12-09 | End: 2019-12-09 | Stop reason: SURG

## 2019-12-09 RX ORDER — CEFAZOLIN SODIUM 2 G/50ML
2000 SOLUTION INTRAVENOUS EVERY 8 HOURS
Status: COMPLETED | OUTPATIENT
Start: 2019-12-10 | End: 2019-12-10

## 2019-12-09 RX ORDER — DOCUSATE SODIUM 100 MG/1
100 CAPSULE, LIQUID FILLED ORAL 2 TIMES DAILY
Status: DISCONTINUED | OUTPATIENT
Start: 2019-12-09 | End: 2019-12-11 | Stop reason: HOSPADM

## 2019-12-09 RX ORDER — ONDANSETRON 2 MG/ML
INJECTION INTRAMUSCULAR; INTRAVENOUS AS NEEDED
Status: DISCONTINUED | OUTPATIENT
Start: 2019-12-09 | End: 2019-12-09 | Stop reason: SURG

## 2019-12-09 RX ORDER — SODIUM CHLORIDE, SODIUM LACTATE, POTASSIUM CHLORIDE, CALCIUM CHLORIDE 600; 310; 30; 20 MG/100ML; MG/100ML; MG/100ML; MG/100ML
INJECTION, SOLUTION INTRAVENOUS CONTINUOUS PRN
Status: DISCONTINUED | OUTPATIENT
Start: 2019-12-09 | End: 2019-12-09 | Stop reason: SURG

## 2019-12-09 RX ORDER — PANTOPRAZOLE SODIUM 40 MG/1
40 TABLET, DELAYED RELEASE ORAL
Status: DISCONTINUED | OUTPATIENT
Start: 2019-12-10 | End: 2019-12-11 | Stop reason: HOSPADM

## 2019-12-09 RX ORDER — SODIUM CHLORIDE 9 MG/ML
INJECTION, SOLUTION INTRAVENOUS AS NEEDED
Status: DISCONTINUED | OUTPATIENT
Start: 2019-12-09 | End: 2019-12-09 | Stop reason: HOSPADM

## 2019-12-09 RX ORDER — PROPOFOL 10 MG/ML
INJECTION, EMULSION INTRAVENOUS CONTINUOUS PRN
Status: DISCONTINUED | OUTPATIENT
Start: 2019-12-09 | End: 2019-12-09 | Stop reason: SURG

## 2019-12-09 RX ORDER — CEFAZOLIN SODIUM 2 G/50ML
2000 SOLUTION INTRAVENOUS ONCE
Status: COMPLETED | OUTPATIENT
Start: 2019-12-09 | End: 2019-12-09

## 2019-12-09 RX ORDER — SODIUM CHLORIDE, SODIUM LACTATE, POTASSIUM CHLORIDE, CALCIUM CHLORIDE 600; 310; 30; 20 MG/100ML; MG/100ML; MG/100ML; MG/100ML
125 INJECTION, SOLUTION INTRAVENOUS CONTINUOUS
Status: DISCONTINUED | OUTPATIENT
Start: 2019-12-09 | End: 2019-12-10

## 2019-12-09 RX ORDER — FERROUS SULFATE 325(65) MG
325 TABLET ORAL 2 TIMES DAILY WITH MEALS
Status: DISCONTINUED | OUTPATIENT
Start: 2019-12-10 | End: 2019-12-11 | Stop reason: HOSPADM

## 2019-12-09 RX ORDER — FENTANYL CITRATE 50 UG/ML
INJECTION, SOLUTION INTRAMUSCULAR; INTRAVENOUS AS NEEDED
Status: DISCONTINUED | OUTPATIENT
Start: 2019-12-09 | End: 2019-12-09 | Stop reason: SURG

## 2019-12-09 RX ORDER — ONDANSETRON 2 MG/ML
4 INJECTION INTRAMUSCULAR; INTRAVENOUS ONCE AS NEEDED
Status: DISCONTINUED | OUTPATIENT
Start: 2019-12-09 | End: 2019-12-11 | Stop reason: HOSPADM

## 2019-12-09 RX ORDER — MIDAZOLAM HYDROCHLORIDE 2 MG/2ML
INJECTION, SOLUTION INTRAMUSCULAR; INTRAVENOUS AS NEEDED
Status: DISCONTINUED | OUTPATIENT
Start: 2019-12-09 | End: 2019-12-09 | Stop reason: SURG

## 2019-12-09 RX ORDER — CEFAZOLIN SODIUM 2 G/50ML
2000 SOLUTION INTRAVENOUS ONCE
Status: DISCONTINUED | OUTPATIENT
Start: 2019-12-09 | End: 2019-12-09 | Stop reason: SDUPTHER

## 2019-12-09 RX ADMIN — SODIUM CHLORIDE 75 ML/HR: 0.9 INJECTION, SOLUTION INTRAVENOUS at 08:11

## 2019-12-09 RX ADMIN — SODIUM CHLORIDE, SODIUM LACTATE, POTASSIUM CHLORIDE, AND CALCIUM CHLORIDE 125 ML/HR: .6; .31; .03; .02 INJECTION, SOLUTION INTRAVENOUS at 20:43

## 2019-12-09 RX ADMIN — MIDAZOLAM HYDROCHLORIDE 2 MG: 1 INJECTION, SOLUTION INTRAMUSCULAR; INTRAVENOUS at 16:34

## 2019-12-09 RX ADMIN — EPHEDRINE SULFATE 5 MG: 50 INJECTION, SOLUTION INTRAVENOUS at 18:07

## 2019-12-09 RX ADMIN — SODIUM CHLORIDE, SODIUM LACTATE, POTASSIUM CHLORIDE, AND CALCIUM CHLORIDE: .6; .31; .03; .02 INJECTION, SOLUTION INTRAVENOUS at 17:20

## 2019-12-09 RX ADMIN — MORPHINE SULFATE 2 MG: 2 INJECTION, SOLUTION INTRAMUSCULAR; INTRAVENOUS at 11:11

## 2019-12-09 RX ADMIN — EPHEDRINE SULFATE 5 MG: 50 INJECTION, SOLUTION INTRAVENOUS at 17:36

## 2019-12-09 RX ADMIN — CEFAZOLIN SODIUM 2000 MG: 2 SOLUTION INTRAVENOUS at 16:20

## 2019-12-09 RX ADMIN — OXYCODONE HYDROCHLORIDE 5 MG: 5 TABLET ORAL at 20:43

## 2019-12-09 RX ADMIN — DOCUSATE SODIUM 100 MG: 100 CAPSULE, LIQUID FILLED ORAL at 20:43

## 2019-12-09 RX ADMIN — BUPIVACAINE HYDROCHLORIDE IN DEXTROSE 1.6 ML: 7.5 INJECTION, SOLUTION SUBARACHNOID at 16:45

## 2019-12-09 RX ADMIN — PHENYLEPHRINE HYDROCHLORIDE 200 MCG: 10 INJECTION INTRAVENOUS at 16:54

## 2019-12-09 RX ADMIN — FENTANYL CITRATE 50 MCG: 50 INJECTION, SOLUTION INTRAMUSCULAR; INTRAVENOUS at 16:39

## 2019-12-09 RX ADMIN — MORPHINE SULFATE 2 MG: 2 INJECTION, SOLUTION INTRAMUSCULAR; INTRAVENOUS at 06:19

## 2019-12-09 RX ADMIN — ONDANSETRON 4 MG: 2 INJECTION INTRAMUSCULAR; INTRAVENOUS at 16:40

## 2019-12-09 RX ADMIN — PHENYLEPHRINE HYDROCHLORIDE 25 MCG/MIN: 10 INJECTION INTRAVENOUS at 16:49

## 2019-12-09 RX ADMIN — FENTANYL CITRATE 50 MCG: 50 INJECTION, SOLUTION INTRAMUSCULAR; INTRAVENOUS at 16:36

## 2019-12-09 RX ADMIN — PROPOFOL 100 MCG/KG/MIN: 10 INJECTION, EMULSION INTRAVENOUS at 16:49

## 2019-12-09 RX ADMIN — OXYCODONE HYDROCHLORIDE 2.5 MG: 5 TABLET ORAL at 01:23

## 2019-12-09 RX ADMIN — SODIUM CHLORIDE, SODIUM LACTATE, POTASSIUM CHLORIDE, AND CALCIUM CHLORIDE: .6; .31; .03; .02 INJECTION, SOLUTION INTRAVENOUS at 16:34

## 2019-12-09 RX ADMIN — MORPHINE SULFATE 2 MG: 2 INJECTION, SOLUTION INTRAMUSCULAR; INTRAVENOUS at 22:52

## 2019-12-09 NOTE — UTILIZATION REVIEW
Notification of Inpatient Admission/Inpatient Authorization Request   This is a Notification of Inpatient Admission for [address]  Be advised that this patient was admitted to our facility under Inpatient Status  Contact Elvira Timmons at [phone] for additional admission information  [facility] DEPT  DEDICATED [phone]  Patient Name:   Arnoldo Arauz   YOB: 1950       [address]   [address]:   [address]  Tax ID: [id number]  NPI: [id number] Attending Provider/NPI: Pepper Foreman Md [[id number]]   Attending Physician:  BRONWYN Cuevas  [facility] Practioner ID- [id number]  [address] [place] [address]  Phone 1: [phone]  Fax: [phone]   Place of Service Code: 24     Place of Service Name:  [address] [place] [address]   Start Date: 12/8/19 1355     Discharge Date & Time: No discharge date for patient encounter  Type of Admission: Inpatient Status Discharge Disposition (if discharged): Final discharge disposition not confirmed   Patient Diagnoses: Multiple injuries [T07  XXXA]  Closed fracture of neck of right femur, initial encounter ([facility] [address] ) [S72 001A]     Orders: Admission Orders (From admission, onward)     Ordered        12/08/19 1355  Inpatient Admission (expected length of stay for this patient Order details is greater than two midnights)  Once                    Assigned Utilization Review Contact: Elvira Timmons  Utilization   Network Utilization Review Department  Phone: [phone]; Fax [phone]  Email: [email or web address]   ATTENTION PAYERS: Please call the assigned Utilization  directly with any questions or concerns ALL voicemails in the department are confidential  Send all requests for admission clinical reviews, approved or denied determinations and any other requests to dedicated fax number belonging to the campus where the patient is receiving treatment

## 2019-12-09 NOTE — UTILIZATION REVIEW
Initial Clinical Review    Admission: Date/Time/Statement: Inpatient Admission Orders (From admission, onward)     Ordered        12/08/19 1355  Inpatient Admission (expected length of stay for this patient Order details is greater than two midnights)  Once                   Orders Placed This Encounter   Procedures    Inpatient Admission (expected length of stay for this patient Order details is greater than two midnights)     Standing Status:   Standing     Number of Occurrences:   1     Order Specific Question:   Admitting Physician     Answer:   Alysha Simmons [[id number]]     Order Specific Question:   Level of Care     Answer:   Med Surg [16]     Order Specific Question:   Bed request comments     Answer:   NPO after midnight     Order Specific Question:   Estimated length of stay     Answer:   More than 2 Midnights     Order Specific Question:   Certification     Answer:   I certify that inpatient services are medically necessary for this patient for a duration of greater than two midnights  See H&P and MD Progress Notes for additional information about the patient's course of treatment  ED Arrival Information     Expected Arrival Acuity Means of Arrival Escorted By Service Admission Type    - 12/8/2019 12:58 Urgent Ambulance [facility] Urgent    Arrival Complaint    Fall        Chief Complaint   Patient presents with    Hip Injury     Pt was stepping down off a ladder when she fell on her R hip, -headstrike -LOC -thinners  Pt was unable to get up off floor on her own and was unable to bear weight on the R leg  Assessment/Plan: 81year old female to the ED from home via EMS after falling off ladder, injuring her right hip and unable to walk  Admitted to inpatient for closed right hip fracture  She thinks she missed a rung on the ladder, and fell to the ground  NO LOC  She has tenderness and shortened right lower extremity  IV fluids, NPO after MN, IV and po pain medications  Ortho consult  Ortho consult 12/9: Displaced intracapsular fracture proximal right femur  She allows good range of motion of the ankle and foot  Any attempted knee range of motion triggered hip/groin pain  ED Triage Vitals [12/08/19 1302]   Temperature Pulse Respirations Blood Pressure SpO2   97 8 °F (36 6 °C) 72 20 (!) 171/90 98 %      Temp Source Heart Rate Source Patient Position - Orthostatic VS BP Location FiO2 (%)   Oral Monitor Lying Right arm --      Pain Score       Worst Possible Pain        Wt Readings from Last 1 Encounters:   12/08/19 78 3 kg (172 lb 9 9 oz)     Additional Vital Signs:   Date/Time Temp Pulse Resp BP MAP (mmHg) SpO2 O2 Device Patient Position - Orthostatic VS   12/09/19 0510       None (Room air)    12/09/19 04:56:35 98 4 °F (36 9 °C) 73  161/79 106 97 %     12/08/19 2226 98 9 °F (37 2 °C) 78  122/79 93 95 %     12/08/19 15:48:57 98 2 °F (36 8 °C) 88 18 170/94 119 94 % None (Room air)    12/08/19 14:50:52 97 3 °F (36 3 °C)Abnormal  77 18 166/94 118 97 %     12/08/19 1302 97 8 °F (36 6 °C) 72 20 171/90Abnormal   98 % None (Room air)      Pertinent Labs/Diagnostic Test Results:   CT head 12/8:  No acute intracranial abnormality  CXR 12/9:    No acute cardiopulmonary disease  CT hip right 12/8: Comminuted slightly impacted right subcapital hip fracture including mild anterior angulation at the fracture site  No additional fracture identified  XRay femur 12/9: Right subcapital hip fracture noted  Xray pelvis 12/9: Right subcapital hip fracture    Results from last 7 days   Lab Units 12/09/19  0502 12/08/19  1720 12/08/19  1403   WBC Thousand/uL 9 35  --  8 18   HEMOGLOBIN g/dL 13 5  --  14 1   HEMATOCRIT % 38 8  --  40 8   PLATELETS Thousands/uL 249 286 285   NEUTROS ABS Thousands/µL  --   --  5 67         Results from last 7 days   Lab Units 12/09/19  0502 12/08/19  1403   SODIUM mmol/L 140 137   POTASSIUM mmol/L 3 7 3 3*   CHLORIDE mmol/L 103 99*   CO2 mmol/L 29 30   ANION GAP mmol/L 8 8   BUN mg/dL 19 21   CREATININE mg/dL 0 87 0 86   EGFR ml/min/1 73sq m 69 70   CALCIUM mg/dL 8 8 9 5     Results from last 7 days   Lab Units 12/08/19  1403   AST U/L 30   ALT U/L 44   ALK PHOS U/L 133*   TOTAL PROTEIN g/dL 7 8   ALBUMIN g/dL 4 0   TOTAL BILIRUBIN mg/dL 0 32         Results from last 7 days   Lab Units 12/09/19  0502 12/08/19  1403   GLUCOSE RANDOM mg/dL 109 110     Results from last 7 days   Lab Units 12/08/19  1403   PROTIME seconds 12 9   INR  0 97   PTT seconds 26       Results from last 7 days   Lab Units 12/08/19  1520   CLARITY UA  Clear   COLOR UA  Yellow   SPEC GRAV UA  1 020   PH UA  5 0   GLUCOSE UA mg/dl Negative   KETONES UA mg/dl Negative   BLOOD UA  Negative   PROTEIN UA mg/dl Negative   NITRITE UA  Negative   BILIRUBIN UA  Negative   UROBILINOGEN UA E U /dl 0 2   LEUKOCYTES UA  Negative       Past Medical History:   Diagnosis Date    Essential hypertension     Hyperlipidemia      Present on Admission:   Essential hypertension    Admitting Diagnosis: Multiple injuries [T07  XXXA]  [facility] right [facility] [S72 001A]  Age/Sex: 81 y o  female  Admission Orders:  NPO  ORtho surgery    Scheduled Medications:    Medications:  acetaminophen 975 mg Oral Q8H [address]   montelukast 10 mg Oral Daily     Continuous IV Infusions:    sodium chloride 75 mL/hr Intravenous Continuous     PRN Meds:    aluminum-magnesium hydroxide-simethicone 30 mL Oral Q6H PRN   morphine injection 2 mg Intravenous Q4H PRN x2   oxyCODONE 2 5 mg Oral Q4H PRN x2   oxyCODONE 5 mg Oral Q4H PRN     IP CONSULT TO ORTHOPEDIC SURGERY    Network Utilization Review Department  [email or web address]  ATTENTION: Please call with any questions or concerns to [phone] and carefully listen to the prompts so that you are directed to the right person   All voicemails are confidential   Elizabeth Mendoza all requests for admission clinical reviews, approved or denied determinations and any other requests to dedicated fax number below belonging to the campus where the patient is receiving treatment   List of dedicated fax numbers for the Facilities:  [address] [place] [address] DENIALS (Administrative/Medical Necessity) [phone]   [address]  (Maternity/NICU/Pediatrics) [phone]   Liam Needs [phone]   Shanti Taylor [phone]   Joe Cooper [phone]   [facility] [address] [facility] [phone]   [place] [phone]   [facility] [address] [facility] [phone]

## 2019-12-09 NOTE — PROGRESS NOTES
Progress Note - Angela Trevino 1950, 81 y o  female MRN: [id number]    Unit/Bed#: -01 Encounter: [id number]    Primary Care Provider: Tano Marcano MD   Date and time admitted to hospital: 2019 12:59 PM        * Closed right hip fracture [facility])  Assessment & Plan  · Patient had a mechanical fall yesterday  · Right hip fracture-femoral neck  · Emergency room discussed with Orthopedics-NPO after midnight  · Pain control  · Orthopedic consultation appreciated  · For OR today for right hemiarthroplasty    Essential hypertension  Assessment & Plan  · Patient is on  maxide as outpatient  · Will hold for the time being since the patient is going to  OR  · Monitor blood pressure      VTE Pharmacologic Prophylaxis:   Pharmacologic: Enoxaparin (Lovenox)  Mechanical VTE Prophylaxis in Place: Yes    Patient Centered Rounds: I have performed bedside rounds with nursing staff today  Discussions with Specialists or Other Care Team Provider:     Education and Discussions with Family / Patient: patient    Time Spent for Care: 30 minutes  More than 50% of total time spent on counseling and coordination of care as described above  Current Length of Stay: 1 day(s)    Current Patient Status: Inpatient   Certification Statement: The patient will continue to require additional inpatient hospital stay due to pending  surgery    Discharge Plan:     Code Status: Level 1 - Full Code      Subjective:   patient seen and examined  Patient reported that she has to use morphine for pain control earlier today  Other than that no specific complaint  For OR later today    Objective:     Vitals:   Temp (24hrs), Av 5 °F (36 9 °C), Min:98 2 °F (36 8 °C), Max:98 9 °F (37 2 °C)    Temp:  [98 2 °F (36 8 °C)-98 9 °F (37 2 °C)] 98 3 °F (36 8 °C)  HR:  [71-88] 71  Resp:  [18] 18  BP: (122-170)/(79-94) 140/79  SpO2:  [94 %-97 %] 96 %  Body mass index is 28 73 kg/m²       Input and Output Summary (last 24 hours): Intake/Output Summary (Last 24 hours) at 12/9/2019 1542  Last data filed at 12/9/2019 1303  Gross per 24 hour   Intake 831 25 ml   Output 650 ml   Net 181 25 ml       Physical Exam:     Physical Exam   Constitutional: She appears well-developed  No distress  HENT:   Head: Normocephalic and atraumatic  Eyes: Right eye exhibits no discharge  Left eye exhibits no discharge  Neck: No JVD present  Cardiovascular: Normal rate and regular rhythm  No murmur heard  Pulmonary/Chest: Effort normal  No stridor  No respiratory distress  Abdominal: Soft  She exhibits no distension  Musculoskeletal: Normal range of motion  She exhibits tenderness  She exhibits no edema  Right hip tenderness present   Neurological: She is alert  Skin: Skin is warm  Additional Data:     Labs:    Results from last 7 days   Lab Units 12/09/19  0502  12/08/19  1403   WBC Thousand/uL 9 35  --  8 18   HEMOGLOBIN g/dL 13 5  --  14 1   HEMATOCRIT % 38 8  --  40 8   PLATELETS Thousands/uL 249   < > 285   NEUTROS PCT %  --   --  70   LYMPHS PCT %  --   --  24   MONOS PCT %  --   --  4   EOS PCT %  --   --  1    < > = values in this interval not displayed  Results from last 7 days   Lab Units 12/09/19  0502 12/08/19  1403   POTASSIUM mmol/L 3 7 3 3*   CHLORIDE mmol/L 103 99*   CO2 mmol/L 29 30   BUN mg/dL 19 21   CREATININE mg/dL 0 87 0 86   CALCIUM mg/dL 8 8 9 5   ALK PHOS U/L  --  133*   ALT U/L  --  44   AST U/L  --  30     Results from last 7 days   Lab Units 12/08/19  1403   INR  0 97       * I Have Reviewed All Lab Data Listed Above  * Additional Pertinent Lab Tests Reviewed:  [address] Admission Reviewed    Imaging:    Imaging Reports Reviewed Today Include:   Imaging Personally Reviewed by Myself Includes:     Recent Cultures (last 7 days):           Last 24 Hours Medication List:     Current Facility-Administered Medications:  acetaminophen 975 mg Oral [facility] Lauren Lindsay MD aluminum-magnesium hydroxide-simethicone 30 mL Oral Q6H PRN Imer Cheney MD    cefazolin 2,000 mg Intravenous Once Venessa Mohan MD    montelukast 10 mg Oral Daily Imer Cheney MD    morphine injection 2 mg Intravenous Q4H PRN Imer Cheney MD    oxyCODONE 2 5 mg Oral Q4H PRN Imer Cheney MD    oxyCODONE 5 mg Oral Q4H PRN Imer Cheney MD    sodium chloride 75 mL/hr Intravenous Continuous Imer Cheney MD Last Rate: 75 mL/hr (12/09/19 9200)        Today, Patient Was Seen By: Imer Cheney MD    ** Please Note: Dictation voice to text software may have been used in the creation of this document   **

## 2019-12-09 NOTE — OP NOTE
OPERATIVE REPORT  PATIENT NAME: Ej Lea    :  1950  MRN: [id number]  Pt Location:  OR ROOM 02    SURGERY DATE: 2019    Surgeon(s) and Role:     * Cindia Brunner - Primary     * Jyoti Agarwal PA-C - Assisting         The PA was required to assist with retraction of soft tissues, retraction to assist with protection of neurovascular structures and to assist with manipulation of the prosthesis for reduction and trialing  Residents were not available  Preop Diagnosis:  Closed fracture of neck of right femur, initial encounter ([place] [address] ) [S72 001A]    Post-Op Diagnosis Codes:     * Closed fracture of neck of right femur, initial encounter ([place] [address] ) [S72 001A]    Procedure(s) (LRB):  HEMIARTHROPLASTY HIP (BIPOLAR) (Right)    Specimen(s):  * No specimens in log *    Estimated Blood Loss:   150 mL    Drains:  None    Anesthesia Type:   Spinal with supplemental local utilizing 1% lidocaine with epinephrine    Fluids:  1600 cc    Operative Indications:  Closed fracture of neck of right femur, initial encounter ([place] [address] ) Jack Norris is a 29-year-old female who fell 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  She was seen in the emergency room at [address] [place] [address], x-rays obtained and she was then admitted with a fracture of her right hip  The patient was seen by myself with the risks, benefits, options and alternatives of treatment were discussed  It was elected to proceed with hemiarthroplasty of her right hip  X-rays demonstrated a subcapital fracture  CT scan demonstrated the fracture to be noticeably displaced, especially in comparison to the x-ray images  Operative Findings: At the time of the procedure, the displaced intracapsular fracture of the right hip was noted  A hemiarthroplasty was performed with a size 2 femoral stem, a +5 mm neck adaptor and size 49 mm endo femoral head    At the conclusion of the procedure, she had approximately equal leg lengths, extension of 10°, internal rotation beyond 70° in the position of sleep, 90° of flexion in neutral and 90° of flexion with the leg adducted off the table  Complications:   None    Procedure and Technique:  Augie Alejo was taken to the operating room and administered a spinal anesthetic  Patient was positioned in the lateral position, operative side up, care taken to protect neurovascular structures and bony prominences and the patient secured with the hip holding device  A preoperative time-out was performed andpreoperative antibiotics  The right lower extremity was then prepped and draped in standard fashion  A posterolateral approach was then performed in standard fashion with sharp dissection carried down through the skin and subcutaneous tissues  Bleeding was controlled with cautery and the soft tissues were divided to the fascia  The fascia was then divided in line with the incision with gluteal muscle fibers split bluntly  Bleeding was controlled with cautery  The charnley self-retaining retractor was then placed  The capsular minimus was identified and elevated, the external rotators divided at their femoral insertion and bleeding controlled with cautery  The capsule was then incised  The acetabulum was cleansed of debris and a size 49 mm endo femoral head fit nicely within the fossa  A femoral neck elevator was utilized to allow access of the femoral shaft  The box osteotome was used to initially access the femoral canal followed by the canal finder and then reaming to a size 3, then broaching was performed to a size 2  The 3 broach did not seem to want to seat fully within the proximal femur and 2 broach was reinserted  This fit nicely within the medullary canal and a trial reduction was performed utilizing the size +5 mm neck and 49 mm endo femoral head    Good stability was noted in a wide range of motion including internal rotation beyond 70° in multiple positions, leg lengths were approximately equal and extension of 10° was obtained  Trial components were removed and the definitive size 2 prosthetic femoral component was impacted into position followed by placement of the +5 mm neck extension and 49 mm endo femoral head  The hip was reduced and range of motion was performed  Good stability was noted through a wide range of motion with internal rotation of 70° demonstrating no instability in multiple positions  Leg lengths were approximately equal and extension of 10° was easily obtained  Local anesthetic was used to infiltrate the soft tissues  The fascia was approximated with #1 Vicryl  Subcutaneous tissues were approximated with #2  O Vicryl and the skin secured with staples  Dressings were applied consisting of Mepilex  An abduction pillow was then placed in the patient transferred to the recovery room in stable and satisfactory postop condition       I was present for the entire procedure    Patient Disposition:  ICU    SIGNATURE: Maria Del Carmen Cormier  DATE: December 9, 2019  TIME: 6:18 PM

## 2019-12-09 NOTE — ASSESSMENT & PLAN NOTE
· Patient had a mechanical fall yesterday  · Right hip fracture-femoral neck  · Emergency room discussed with Orthopedics-NPO after midnight  · Pain control  · Orthopedic consultation appreciated  · For OR today for right hemiarthroplasty

## 2019-12-09 NOTE — CONSULTS
Consultation - Hannah Esteban 81 y o  female MRN: [id number]  Unit/Bed#: -01 Encounter: [id number]      Assessment/Plan     Assessment:  Displaced intracapsular fracture proximal right femur; ambulatory dysfunction; history of osteopenia  Plan:  I discussed the risks, benefits, options and alternatives of treatment  The plan will be to proceed with hemiarthroplasty the later today  She will receive perioperative antibiotics as well as being placed on Lovenox postoperatively for DVT prophylaxis  Noted anticipate potential discharge home  However, she has been informed that she may need a stay at rehab or ECF pending her progress postoperatively  History of Present Illness   Physician Requesting Consult: Laury Romberg, MD  Reason for Consult / Principal Problem:  Fracture right hip  HPI: Rex Perez is a 81y o  year old female who presents with injury to her right lower extremity when she 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 [employer] at approximately noon on 12/08/2019  She states she was getting down from a ladder, believes she may have missed the last step and fell injuring her right lower extremity  She was unable to ambulate or bear weight and was brought by ambulance to the emergency room at [address] [place] [address]  X-rays were obtained and she was admitted to the medical service  Orthopedic consultation was requested  She denies any chest pain, shortness of breath, lightheadedness or dizziness at the time of her fall  She denies any of the symptoms at this time  She denies any additional injuries  She does note a little bit of soreness in her right shoulder area of this morning  She denies any history of groin pain, thigh pain or buttock pain prior to the fall  She was an independent, community ambulator without assistive devices prior to the fall  Consults    Review of Systems:  The patient's 10 organ system review was negative excluding the chief complaint      Historical Information   Past Medical History:   Diagnosis Date    Essential hypertension     Hyperlipidemia      Past Surgical History:   Procedure Laterality Date    BASAL CELL CARCINOMA EXCISION      BREAST SURGERY      Biopsy     HERNIA REPAIR       Social History   Social History     Substance and Sexual Activity   Alcohol Use Yes    Comment: Occasionally      Social History     Substance and Sexual Activity   Drug Use Never     Social History     Tobacco Use   Smoking Status Never Smoker   Smokeless Tobacco Never Used     Family History: non-contributory    Meds/Allergies   all current active meds have been reviewed  Allergies   Allergen Reactions    Codeine Hives       Objective   Vitals: Blood pressure 161/79, pulse 73, temperature 98 4 °F (36 9 °C), resp  rate 18, height 5' 5" (1 651 m), weight 78 3 kg (172 lb 9 9 oz), SpO2 97 %  ,Body mass index is 28 73 kg/m²  Intake/Output Summary (Last 24 hours) at 12/9/2019 0737  Last data filed at 12/9/2019 0457  Gross per 24 hour   Intake 831 25 ml   Output 450 ml   Net 381 25 ml     I/O last 24 hours: In: 831 3 [I V :831 3]  Out: 450 [Urine:450]    Invasive Devices     Peripheral Intravenous Line            Peripheral IV 12/08/19 Right Antecubital less than 1 day                Physical Exam:  Yisel Jorgensen is alert, oriented and seems to be in no acute distress  HEENT exam is benign  Heart regular  Lungs clear  Abdomen soft, nontender  Breast, rectal and genital examinations deferred  Skin without lacerations or abrasions  Pulses are palpable in all extremities and there is no significant peripheral edema  Neuro exam demonstrates no gross deficits with good mass and tone    Ortho Exam:  The right lower extremity is somewhat shortened, slightly externally rotated and abducted  She has tenderness to palpation of the right hip area  There is no ecchymosis or laceration noted    She has no tenderness to palpation of the femoral shaft, the right knee, the leg or the ankle and foot  She allows good range of motion of the ankle and foot  Any attempted knee range of motion triggered hip/groin pain  Lower extremity examination and bilateral upper extremity examinations are benign  She has good range of motion throughout both lower extremities and no tenderness  Specifically, the right shoulder was nontender with full, painless range of motion  The clavicles and ribs were nontender  Lab Results:   CBC:   Lab Results   Component Value Date    WBC 9 35 12/09/2019    HGB 13 5 12/09/2019    HCT 38 8 12/09/2019    MCV 88 12/09/2019     12/09/2019    MCH 30 5 12/09/2019    MCHC 34 8 12/09/2019    RDW 11 8 12/09/2019    MPV 10 6 12/09/2019    NRBC 0 12/08/2019     CMP:   Lab Results   Component Value Date    SODIUM 140 12/09/2019     12/09/2019    CO2 29 12/09/2019    BUN 19 12/09/2019    CREATININE 0 87 12/09/2019    CALCIUM 8 8 12/09/2019    AST 30 12/08/2019    ALT 44 12/08/2019    ALKPHOS 133 (H) 12/08/2019    EGFR 69 12/09/2019     PT/INR:   Lab Results   Component Value Date    INR 0 97 12/08/2019     Imaging Studies: A pelvis x-ray, right femur x-ray and CT scan of the hip were all reviewed  These demonstrate a displaced intracapsular fracture of the proximal right femur  EKG, Pathology, and Other Studies: I have personally reviewed pertinent reports      VTE Prophylaxis: Sequential compression device (Venodyne)

## 2019-12-09 NOTE — ANESTHESIA PROCEDURE NOTES
Spinal Block    Patient location during procedure: OR  Start time: 12/9/2019 4:45 PM  Reason for block: primary anesthetic  Staffing  Anesthesiologist: Jose Daniel Yip MD  Resident/CRNA: Pantera Maxwell CRNA  Performed: anesthesiologist   Preanesthetic Checklist  Completed: patient identified, site marked, surgical consent, pre-op evaluation, timeout performed, IV checked, risks and benefits discussed and monitors and equipment checked  Spinal Block  Patient position: right lateral decubitus  Prep: Betadine  Patient monitoring: heart rate, cardiac monitor, continuous pulse ox and frequent blood pressure checks  Approach: midline  Location: L3-4  Injection technique: single-shot  Needle  Needle type: pencil-tip   Needle gauge: 25 G  Needle length: 10 cm  Assessment  Injection Assessment:  negative aspiration for heme, no paresthesia on injection and positive aspiration for clear CSF    Post-procedure:  adhesive bandage applied  Additional Notes  Time out performed immediately prior to procedure; good CSF swirl at beginning and end of injection

## 2019-12-09 NOTE — PLAN OF CARE
Problem: Potential for Falls  Goal: Patient will remain free of falls  Description  INTERVENTIONS:  - Assess patient frequently for physical needs  -  Identify cognitive and physical deficits and behaviors that affect risk of falls    -  [place] fall precautions as indicated by assessment   - Educate patient/family on patient safety including physical limitations  - Instruct patient to call for assistance with activity based on assessment  - Modify environment to reduce risk of injury  - Consider OT/PT consult to assist with strengthening/mobility  Outcome: Progressing     Problem: Prexisting or High Potential for Compromised Skin Integrity  Goal: Skin integrity is maintained or improved  Description  INTERVENTIONS:  - Identify patients at risk for skin breakdown  - Assess and monitor skin integrity  - Assess and monitor nutrition and hydration status  - Monitor labs   - Assess for incontinence   - Turn and reposition patient  - Assist with mobility/ambulation  - Relieve pressure over bony prominences  - Avoid friction and shearing  - Provide appropriate hygiene as needed including keeping skin clean and dry  - Evaluate need for skin moisturizer/barrier cream  - Collaborate with interdisciplinary team   - Patient/family teaching   Outcome: Progressing     Problem: PAIN - ADULT  Goal: Verbalizes/displays adequate comfort level or baseline comfort level  Description  Interventions:  - Encourage patient to monitor pain and request assistance  - Assess pain using appropriate pain scale  - Administer analgesics based on type and severity of pain and evaluate response  - Implement non-pharmacological measures as appropriate and evaluate response  - Consider cultural and social influences on pain and pain management  - Notify physician/advanced practitioner if interventions unsuccessful or patient reports new pain  Outcome: Progressing     Problem: INFECTION - ADULT  Goal: Absence or prevention of progression during hospitalization  Description  INTERVENTIONS:  - Assess and monitor for signs and symptoms of infection  - Monitor lab/diagnostic results  - Monitor all insertion sites, i e  indwelling lines, tubes, and drains  - Administer medications as ordered  - Instruct and encourage patient and family to use good hand hygiene technique  - Identify and instruct in appropriate isolation precautions for identified infection/condition   Outcome: Progressing     Problem: Knowledge Deficit  Goal: Patient/family/caregiver demonstrates understanding of disease process, treatment plan, medications, and discharge instructions  Description  Complete learning assessment and assess knowledge base    Interventions:  - Provide teaching at level of understanding  - Provide teaching via preferred learning methods  Outcome: Progressing

## 2019-12-09 NOTE — ANESTHESIA PREPROCEDURE EVALUATION
Review of Systems/Medical History  Patient summary reviewed  Chart reviewed      Cardiovascular  EKG reviewed, Hyperlipidemia, Hypertension controlled,    Pulmonary  Negative pulmonary ROS        GI/Hepatic  Negative GI/hepatic ROS   GERD well controlled,        Negative  ROS        Endo/Other  Negative endo/other ROS      GYN       Hematology  Negative hematology ROS      Musculoskeletal  Negative musculoskeletal ROS   Comment: Right hip fracture secondary to mechanical fall      Neurology  Negative neurology ROS      Psychology   Negative psychology ROS              Physical Exam    Airway    Mallampati score: II  TM Distance: >3 FB  Neck ROM: full     Dental   No notable dental hx     Cardiovascular  Rhythm: regular, Rate: normal,     Pulmonary  Breath sounds clear to auscultation,     Other Findings        Anesthesia Plan  ASA Score- 2     Anesthesia Type- spinal with ASA Monitors  Additional Monitors:   Airway Plan:         Plan Factors-    Induction-     Postoperative Plan- Plan for postoperative opioid use  Informed Consent- Anesthetic plan and risks discussed with patient  I personally reviewed this patient with the CRNA  Discussed and agreed on the Anesthesia Plan with the CRNA  Sahara Bautista

## 2019-12-10 LAB
ANION GAP SERPL CALCULATED.3IONS-SCNC: 9 MMOL/L (ref 4–13)
BUN SERPL-MCNC: 13 MG/DL (ref 5–25)
CALCIUM SERPL-MCNC: 7.6 MG/DL (ref 8.3–10.1)
CHLORIDE SERPL-SCNC: 104 MMOL/L (ref 100–108)
CO2 SERPL-SCNC: 27 MMOL/L (ref 21–32)
CREAT SERPL-MCNC: 0.74 MG/DL (ref 0.6–1.3)
ERYTHROCYTE [DISTWIDTH] IN BLOOD BY AUTOMATED COUNT: 11.9 % (ref 11.6–15.1)
GFR SERPL CREATININE-BSD FRML MDRD: 84 ML/MIN/1.73SQ M
GLUCOSE SERPL-MCNC: 125 MG/DL (ref 65–140)
HCT VFR BLD AUTO: 33.1 % (ref 34.8–46.1)
HGB BLD-MCNC: 11.3 G/DL (ref 11.5–15.4)
MCH RBC QN AUTO: 30.3 PG (ref 26.8–34.3)
MCHC RBC AUTO-ENTMCNC: 34.1 G/DL (ref 31.4–37.4)
MCV RBC AUTO: 89 FL (ref 82–98)
PLATELET # BLD AUTO: 222 THOUSANDS/UL (ref 149–390)
PMV BLD AUTO: 10.5 FL (ref 8.9–12.7)
POTASSIUM SERPL-SCNC: 3.8 MMOL/L (ref 3.5–5.3)
RBC # BLD AUTO: 3.73 MILLION/UL (ref 3.81–5.12)
SODIUM SERPL-SCNC: 140 MMOL/L (ref 136–145)
WBC # BLD AUTO: 8.94 THOUSAND/UL (ref 4.31–10.16)

## 2019-12-10 PROCEDURE — 97163 PT EVAL HIGH COMPLEX 45 MIN: CPT

## 2019-12-10 PROCEDURE — 85027 COMPLETE CBC AUTOMATED: CPT | Performed by: ORTHOPAEDIC SURGERY

## 2019-12-10 PROCEDURE — 97535 SELF CARE MNGMENT TRAINING: CPT

## 2019-12-10 PROCEDURE — G8987 SELF CARE CURRENT STATUS: HCPCS

## 2019-12-10 PROCEDURE — 97116 GAIT TRAINING THERAPY: CPT

## 2019-12-10 PROCEDURE — 99233 SBSQ HOSP IP/OBS HIGH 50: CPT | Performed by: INTERNAL MEDICINE

## 2019-12-10 PROCEDURE — G8978 MOBILITY CURRENT STATUS: HCPCS

## 2019-12-10 PROCEDURE — 97166 OT EVAL MOD COMPLEX 45 MIN: CPT

## 2019-12-10 PROCEDURE — 80048 BASIC METABOLIC PNL TOTAL CA: CPT | Performed by: ORTHOPAEDIC SURGERY

## 2019-12-10 PROCEDURE — G8988 SELF CARE GOAL STATUS: HCPCS

## 2019-12-10 PROCEDURE — G8979 MOBILITY GOAL STATUS: HCPCS

## 2019-12-10 RX ADMIN — Medication 1 TABLET: at 08:19

## 2019-12-10 RX ADMIN — ACETAMINOPHEN 975 MG: 325 TABLET ORAL at 23:26

## 2019-12-10 RX ADMIN — CEFAZOLIN SODIUM 2000 MG: 2 SOLUTION INTRAVENOUS at 08:20

## 2019-12-10 RX ADMIN — DOCUSATE SODIUM 100 MG: 100 CAPSULE, LIQUID FILLED ORAL at 08:19

## 2019-12-10 RX ADMIN — MONTELUKAST 10 MG: 10 TABLET, FILM COATED ORAL at 20:25

## 2019-12-10 RX ADMIN — OXYCODONE HYDROCHLORIDE 2.5 MG: 5 TABLET ORAL at 08:19

## 2019-12-10 RX ADMIN — ACETAMINOPHEN 975 MG: 325 TABLET ORAL at 05:03

## 2019-12-10 RX ADMIN — SODIUM CHLORIDE, SODIUM LACTATE, POTASSIUM CHLORIDE, AND CALCIUM CHLORIDE 125 ML/HR: .6; .31; .03; .02 INJECTION, SOLUTION INTRAVENOUS at 14:18

## 2019-12-10 RX ADMIN — OXYCODONE HYDROCHLORIDE 5 MG: 5 TABLET ORAL at 19:56

## 2019-12-10 RX ADMIN — OXYCODONE HYDROCHLORIDE 5 MG: 5 TABLET ORAL at 01:48

## 2019-12-10 RX ADMIN — CEFAZOLIN SODIUM 2000 MG: 2 SOLUTION INTRAVENOUS at 00:18

## 2019-12-10 RX ADMIN — ACETAMINOPHEN 975 MG: 325 TABLET ORAL at 15:33

## 2019-12-10 RX ADMIN — DOCUSATE SODIUM 100 MG: 100 CAPSULE, LIQUID FILLED ORAL at 17:33

## 2019-12-10 RX ADMIN — FERROUS SULFATE TAB 325 MG (65 MG ELEMENTAL FE) 325 MG: 325 (65 FE) TAB at 15:33

## 2019-12-10 RX ADMIN — SODIUM CHLORIDE, SODIUM LACTATE, POTASSIUM CHLORIDE, AND CALCIUM CHLORIDE 125 ML/HR: .6; .31; .03; .02 INJECTION, SOLUTION INTRAVENOUS at 05:05

## 2019-12-10 RX ADMIN — OXYCODONE HYDROCHLORIDE 5 MG: 5 TABLET ORAL at 12:19

## 2019-12-10 RX ADMIN — PANTOPRAZOLE SODIUM 40 MG: 40 TABLET, DELAYED RELEASE ORAL at 05:03

## 2019-12-10 RX ADMIN — FERROUS SULFATE TAB 325 MG (65 MG ELEMENTAL FE) 325 MG: 325 (65 FE) TAB at 08:19

## 2019-12-10 RX ADMIN — CEFAZOLIN SODIUM 2000 MG: 2 SOLUTION INTRAVENOUS at 15:34

## 2019-12-10 RX ADMIN — CYANOCOBALAMIN TAB 500 MCG 500 MCG: 500 TAB at 08:19

## 2019-12-10 NOTE — PLAN OF CARE
Problem: OCCUPATIONAL THERAPY ADULT  Goal: Performs self-care activities at highest level of function for planned discharge setting  See evaluation for individualized goals  Description  Treatment Interventions: ADL retraining, Functional transfer training, UE strengthening/ROM, Endurance training, Equipment evaluation/education, Compensatory technique education, Continued evaluation, Energy conservation, Activityengagement          See flowsheet documentation for full assessment, interventions and recommendations  12/10/2019 1249 by Keaton Don OT  Outcome: Progressing  Note:   Limitation: Decreased ADL status, Decreased UE strength, Decreased Safe judgement during ADL, Decreased endurance, Decreased high-level ADLs  Prognosis: Good  Assessment: Pt seen for treatment session #1 this date  Pt alert and agreeable to participate in therapy  Treatment session to focus on functional transfers, LB dressing and toileting tasks  Therapist providing Pt with reacher and sock aide  Thearpist demonstrating use of AD then allowing Pt to complete  Pt completing LB dressing (pants) with reacher @ Min A d/t Pt having difficulty lifting RLE  Pt standing and completing CM @ Min A for balance and thorough CM  Pt completing donning socks with sock aide @ S with extended time and vc'ing  Pt completing SPT to commode with use of RW @ S  Pt having a BM and completing thorough posterior pericare @ Mod I  Pt with good performance this date although would benefit from continued review and practice  Pt continues to present with decrease activity tolerance, decrease standing balance, decrease performance during ADL tasks, increased pain, decrease generalized strength, decrease activity engagement and decrease performance during functional transfers  Pt would benefit from continued acute OT service to address deficits [facility] towards goals[facility]  OT will continue to follow as appropriate    Recommendation: LONG TERM ACUTE CARE [facility] AT [facility] OT pending progress towards goals)  OT Discharge Recommendation: LONG TERM ACUTE [facility] AT [facility] OT pending progress towards goals)       12/10/2019 1042 by Gaurang Kemp OT  Note:   Limitation: Decreased ADL status, Decreased UE strength, Decreased Safe judgement during ADL, Decreased endurance, Decreased high-level ADLs  Prognosis: Good  Assessment: Pt is a 80 y/o F admitted to [address] [place] [address] 12/8/19 after experiencing a fall from a ladder and landing on her R hip  Dx: closed rip hip fracture requiring surgical repair  Pt had R hemiarthroplasty 12/9/19 and is currently WBAT with hip precautions  Pt with PMHx impacting performance during functional tasks including: HTN, hyperlipidemia  Pt reports living in a 2 story home with 0 BISHNU  Pt lives with her  and was previously completing all ADL/IADL tasks @ I with no AD + driving  Pt's bed/bath is on 2nd floor and laundry is in basement  On evaluation, Pt completing supine>sit @ Mod A for LE management  Pt sitting EOB while maintaining G balance  STS and SPT completed with S with use of RW and vc'ing for safe technique and steadying  Pt requiring Max A for LB dressing for socks/shoes and donning pants around feet with out use of AD  Therapist will return at a later time to educate and demonstrate with PT on use of AD  Pt A&Ox4, scoring 55/100 on Barthel index  Pt vitals initally stable upon start of session, after standing Pt's BP drooped  With extended time and reclining in chair and proping up feet, Pt's BP returned to normal range (refer to chart below for readings)  Pt presents with decrease activity tolerance, decrease standing balance, decrease performance during ADL tasks, decrease UB MS, decrease generalized strength, decrease activity engagement and decrease performance during functional transfers  Pt would benefit from continued acute OT services to address deficits as well as HH OT upon d/c from [address] [place] [address] pending Pt's progress towards goals   Pt has good support from family at home and demonstrates safe technique  OT will continue to follow Pt as appropriate     Recommendation: LONG TERM ACUTE CARE [facility] OT pending progress towards goals)  OT Discharge Recommendation: LONG TERM ACUTE [facility] AT [facility] OT pending progress towards goals)

## 2019-12-10 NOTE — PLAN OF CARE
Problem: OCCUPATIONAL THERAPY ADULT  Goal: Performs self-care activities at highest level of function for planned discharge setting  See evaluation for individualized goals  Description  Treatment Interventions: ADL retraining, Functional transfer training, UE strengthening/ROM, Endurance training, Equipment evaluation/education, Compensatory technique education, Continued evaluation, Energy conservation, Activityengagement          See flowsheet documentation for full assessment, interventions and recommendations  Note:   Limitation: Decreased ADL status, Decreased UE strength, Decreased Safe judgement during ADL, Decreased endurance, Decreased high-level ADLs  Prognosis: Good  Assessment: Pt is a 80 y/o F admitted to [address] [place] [address] 12/8/19 after experiencing a fall from a ladder and landing on her R hip  Dx: closed rip hip fracture requiring surgical repair  Pt had R hemiarthroplasty 12/9/19 and is currently WBAT with hip precautions  Pt with PMHx impacting performance during functional tasks including: HTN, hyperlipidemia  Pt reports living in a 2 story home with 0 BISHNU  Pt lives with her  and was previously completing all ADL/IADL tasks @ I with no AD + driving  Pt's bed/bath is on 2nd floor and laundry is in basement  On evaluation, Pt completing supine>sit @ Mod A for LE management  Pt sitting EOB while maintaining G balance  STS and SPT completed with S with use of RW and vc'ing for safe technique and steadying  Pt requiring Max A for LB dressing for socks/shoes and donning pants around feet with out use of AD  Therapist will return at a later time to educate and demonstrate with PT on use of AD  Pt A&Ox4, scoring 55/100 on Barthel index  Pt vitals initally stable upon start of session, after standing Pt's BP drooped  With extended time and reclining in chair and proping up feet, Pt's BP returned to normal range (refer to chart below for readings)   Pt presents with decrease activity tolerance, decrease standing balance, decrease performance during ADL tasks, decrease UB MS, decrease generalized strength, decrease activity engagement and decrease performance during functional transfers  Pt would benefit from continued acute OT services to address deficits as well as HH OT upon d/c from [address] [place] [address] pending Pt's progress towards goals  Pt has good support from family at home and demonstrates safe technique  OT will continue to follow Pt as appropriate     Recommendation: LONG TERM ACUTE CARE [facility] OT pending progress towards goals)  OT Discharge Recommendation: LONG TERM ACUTE CARE [facility] OT pending progress towards goals)

## 2019-12-10 NOTE — PHYSICAL THERAPY NOTE
PHYSICAL THERAPY EVALUATION  NAME:  Travis Poster  DATE: 12/10/19    AGE:   81 y o  Mrn:   [id number]  ADMIT DX:  Multiple injuries [T07  XXXA]  [facility] right femur, [facility] [S72 001A]    Past Medical History:   Diagnosis Date    Essential hypertension     Hyperlipidemia      Length Of Stay: 2  Performed at least 2 patient identifiers during session: Name and Birthday  PHYSICAL THERAPY EVALUATION :        12/10/19 0847   Note Type   Note type Eval only   Pain Assessment   Pain Assessment 0-10   Pain Score 7   Pain Type Acute pain;Surgical pain   Pain Location Hip   Pain Orientation Right   Home Living   Type of Home House   Home Layout Two level;Bed/bath upstairs; Laundry in basement; Other (Comment)  (no BISHUN, FF to 2nd floor with L HR, L HR from basement)   Bathroom Shower/Tub Walk-in shower  (has tub shower also)   Bathroom Toilet Raised   Bathroom Equipment Grab bars in [id number] Pets bars   Additional Comments Reports lives at home with her spouse  Active with [employer]  Prior Function   Level of [place] Independent with ADLs and functional mobility   Lives With Spouse   Receives Help From Family  (shared work)   ADL Assistance Independent   IADLs Independent   Falls in the last 6 months 1 to 4   Vocational Retired  (recently retired teacher, )   Comments Pt reports being (I) without AD for all mobility and ADLs  Driving  Restrictions/Precautions   Weight Bearing Precautions Per Order Yes   RLE Weight Bearing Per Order WBAT   Other Precautions THR;Multiple lines;Pain; Fall Risk   General   Additional Pertinent History 12/9/13 s/p hemiarthroplasty hip (bipolar) R   Posterolateral approach with posteiror hip precautions   Family/Caregiver Present No   Cognition   Orientation Level Oriented X4   Following Commands Follows all commands and directions without difficulty   RLE Assessment   RLE Assessment X   RLE Overall AROM   R Hip Flexion 75 degrees supine   R Hip ABduction 15 degrees supine   R Hip ADduction to neutral   R Knee Flexion WFL   R Knee Extension 0 degrees supine, ~-10 degrees seated EOB   R Ankle Plantar Flexion WFL   R Ankle Dorsiflexion WFL   Strength RLE   RLE Overall Strength 2-/5  (3-/5 knee ext, 3+/5 knee flexion, ankle DF/PF 4/5)   LLE Assessment   LLE Assessment WFL   Light Touch   RLE Light Touch Grossly intact   LLE Light Touch Grossly intact   Bed Mobility   Supine to Sit 3  Moderate assistance  (modAx1 of R LE, steadying assistance trunk)   Additional items Assist x 1; Increased time required;LE management;Verbal cues  (VCs for technique, sequence)   Additional Comments Educated patient on hip precautions prior to mobility; patient able to recalll 2/3 hip precautions sitting EOB and then able to recall 3/3 precautions at end of evaluation  Required verbal cues for technique and sequence with moderate management of R LE for mobility and to maintain hip precautions  Transfers   Sit to Stand   (steadying assistance)   Additional items Increased time required;Verbal cues; Assist x 1  (VCs for hand placement for safety with RW)   Stand to Sit   (steadying assistance)   Additional items Assist x 1; Increased time required;Verbal cues  (VCs for hand placement)   Stand pivot 4  Minimal assistance  (minAx1 to steadying assistance)   Additional items Assist x 1; Increased time required;Verbal cues  (VCs for sequence, technique  dec stance R LE)   Additional Comments Verbal and visual instruction provided prior to mobility  patient verbalized understanding  Ambulation/Elevation   Gait Assistance   (unable to assess due to orthostatic hypotension)   Balance   Static Sitting Good   Dynamic Sitting Fair +   Static Standing Fair -   Dynamic Standing Poor +   Endurance Deficit   Endurance Deficit Yes   Endurance Deficit Description decreased BP after spt   BP continued to decrease, then increased with recliner reclined and LEs elevated Activity Tolerance   Activity Tolerance Patient limited by fatigue;Patient limited by pain  (orthostatic BP)   Medical Staff Made Aware OTAmie; PCA   Assessment   Prognosis Excellent   Problem List Decreased strength;Decreased endurance;Decreased range of motion; Impaired balance;Decreased mobility; Decreased safety awareness;Orthopedic restrictions;Pain   Goals   Patient Goals "Go home "   STG Expiration Date 12/20/19   PT Treatment Day 0   Plan   Treatment/Interventions Functional transfer training;LE strengthening/ROM; Elevations; Therapeutic exercise; Endurance training;Patient/family training;Bed mobility;Gait training;Spoke to nursing;Spoke to case management;OT;Compensatory technique education   PT Frequency Other (Comment)  (5x/week M-F and 1x either sat or Sun)   Recommendation   Recommendation Home PT; Short-term skilled PT  (Home PT versus STR pending progress)   Equipment Recommended Walker   Additional Comments [facility] 6 clicks 55/96        93/14/21 0855 12/10/19 0900 12/10/19 0905   Vital Signs   Pulse 66 66 61   Blood Pressure 127/70  (supine) 124/70  (EOB) 102/53  (after completing transfer to recliner)      12/10/19 0907 12/10/19 0910 12/10/19 0914   Vital Signs   Pulse 63 64 59   Blood Pressure 98/54  (seated in recliner for 2 minutes) 97/55  (seated in recliner 5') 95/55  (seated in recliner 9')      12/10/19 0915   Vital Signs   Pulse 59   Blood Pressure 99/57  (seated recliner 10 min post spt, reclined and LEs elevated)       (Please find full objective findings from PT assessment regarding body systems outlined above)  Assessment: Pt is a 81 y o  female seen for PT evaluation s/p admission to [address] [place] [address] on 12/8/2019 with Closed right hip fracture ([facility] )  Pt is now s/p R hemiarthroplasty with posterior hip precautions and is WBAT  Order placed for PT services    Upon evaluation: Pt is presenting with impaired functional mobility due to pain, decreased strength, decreased ROM, decreased endurance, impaired balance, decreased safety awareness, orthopedic restrictions and fall risk requiring moderate assistance for bed mobility and minimal assistance for transfers  Pt's clinical presentation is currently unstable/unpredictable given the functional mobility deficits above, especially weakness, decreased ROM, decreased endurance, pain, decreased activity tolerance, decreased functional mobility tolerance, decreased safety awareness, orthopedic restrictions and significant decrease in BP with pt c/o lightheadedness, coupled with fall risks as indicated by AM-PAC 6-Clicks: 68/40 as well as hx of falls, impaired balance and decreased safety awareness and combined with medical complications of hypotension, pain impacting overall mobility status and need for input for mobility technique/safety  Pt's PMHx and comorbidities that may affect physical performance and progress include: HTN  Personal factors affecting pt at time of IE include: multi-level environment, inability to perform ADLs, inability to navigate level surfaces without external assistance, inability to ambulate household distances and recent fall(s)/fall history  Pt will benefit from continued skilled PT services to address deficits as defined above and to maximize level of functional mobility to facilitate return toward PLOF and improved QOL  From PT/mobility standpoint, recommendation at time of d/c would be Home PT vs  STR pending progress in order to reduce fall risk and maximize pt's functional independence and consistency with mobility in order to facilitate return to PLOF  Recommend ther ex next 1-2 sessions  Goals: Pt will: Perform bed mobility tasks with modified I to reposition in bed and prepare for transfers  Pt will perform transfers with modified I to increase Indep in home alone environment, decrease burden of care, decrease risk for falls and improve ease of transfers and prepare for ambulation   Pt will ambulate with RW for >/= 150' with  modified I  to increase Indep in home environment, decrease burden of care, decrease risk for falls, improve activity tolerance and improve gait quality and to access home environment  Pt will complete >/= 12 steps with with unilateral handrail with Supervision to return to [facility] home, decrease risk for falls and improve activity tolerance  Pt will increase R LE strength >/= 1/2 MMT grade to facilitate functional mobility         Florentino Troy, PT,DPT

## 2019-12-10 NOTE — SOCIAL WORK
CM met with pt to discuss role of CM and to discuss any needs pt may have prior to d/c  Pt lives with her  in a 2 SH, 4 BISHNU and 14 steps to the 2nd floor bedroom and bathroom  Home does have 1/2 bath on the first floor  Pt performed ADLs independently pta  Pt ambulates independently  No DME pta  Pt has no hx of HHC or STR  Pt denies hx of MH or D&A tx  PCP: Dr Rose Marie Kaur  Preferred Pharmacy: [facility] [place], [place]    Contact: Johann Baker () [phone]  No formal medical POA   or daughter would make decision on pt behalf  Family will transport at d/c if medically appropriate  Therapy is working with pt and recommending short term rahab vs home therapy depending on pt progress prior to d/c  CM provided list of [facility] agencies to pt  CM will print and provide SNF list as well  A post acute care recommendation was made by your care team for STR  Discussed [place] of Choice with patient  List of facilities given to patient via in person  patient aware the list is custom filtered for them by zip code location and that [facility]'s post acute providers are designated  VS    A post acute care recommendation was made by your care team for [facility]  Discussed [place] of Choice with patient  List of agencies given to patient via in person  patient aware the list is custom filtered for them by zip code location and that [facility] post acute providers are designated

## 2019-12-10 NOTE — NURSING NOTE
Pt did not void post-op, bladder scanned for 453 ml  Was able to void 150 ml clear yellow urine on the bedpan without difficulty  Medicated with Morphine for c/o Rt hip pain  VSS  Tolerating liquids  Denies further complaints  Repositioned for comfort  Will continue to monitor  Call bell in reach

## 2019-12-10 NOTE — PHYSICAL THERAPY NOTE
PHYSICAL THERAPY TREATMENT NOTE  NAME:  [facility]  DATE: 12/10/19    Length Of Stay: 2  Performed at least 2 patient identifiers during session: Name and Birthday    TREATMENT:        12/10/19 7838   Pain Assessment   Pain Assessment 0-10   Pain Score No Pain  (at rest, 7/10 with mobility)   Pain Location Hip   Pain Orientation Right   Precautions   Total Hip Replacement ADduction; Internal rotation; Flexion   Restrictions/Precautions   RLE Weight Bearing Per Order WBAT   Other Precautions Pain; Fall Risk;Multiple lines   General   Chart Reviewed Yes   Bed Mobility   Additional Comments Pt sitting OOB in recliner chair upon arrival  [facility], sitting in recliner chair  Transfers   Sit to Stand 5  Supervision   Additional items Increased time required;Verbal cues  (min VCs for hand placement for safety)   Stand to Sit 5  Supervision   Additional items Increased time required;Verbal cues  (min VCs for hand placement)   Stand pivot 5  Supervision   Additional items Increased time required  (dec stance R LE)   Toilet transfer 5  Supervision   Additional items Commode; Increased time required;Verbal cues  (min VCs for hand placement)   Additional Comments Pt's RW (borrowed from sister) present in room  Adjusted to patient's height  min VCs for hand placement for safety with RW and to facilitate transfer   Ambulation/Elevation   Gait pattern Decreased R stance; Step to;Excessively slow  (step to lead with R  min VCs for inc R knee flex on swing)   Gait Assistance   (minAx1-->close supervision)   Additional items Verbal cues; Assist x 1   Assistive Device Rolling walker   Distance 26'   Balance   Static Sitting Good   Dynamic Sitting Fair +   Static Standing Fair +   Dynamic Standing Fair   Ambulatory   (Poor+ to [address]-)   Activity Tolerance   Activity Tolerance Patient limited by pain   Exercises   Quad Sets Sitting;10 reps;Right  (tactile cues for technique   reclined in recliner)   Knee AROM Long Arc Quad Sitting;Right;AROM;10 reps   Assessment   Prognosis Excellent   Problem List Decreased strength;Decreased range of motion;Decreased endurance; Impaired balance;Decreased safety awareness;Decreased mobility;Orthopedic restrictions;Pain   Goals   PT Treatment Day 1   Plan   Treatment/Interventions Functional transfer training;LE strengthening/ROM; Therapeutic exercise; Endurance training;Gait training;Bed mobility;Elevations; Patient/family training   Progress Slow progress, decreased activity tolerance   PT Frequency Other (Comment)  (5x/wk M-F and 1x either sat or sun)   Recommendation   Recommendation Home PT; Short-term skilled PT  (pending progress)   Equipment Recommended Walker     Pt tolerated session well progressing transfers to supervision and able to trial ambulation  She progressed ambulation within session from [address] initially for wt shifting to close supervision with min VCs for improved gait pattern and min VCs for safety with RW  She is limited by pain, decreased endurance and balance  She was able to recall 3/3 hip precautions this PM with min cues for 3rd precaution for no IR  She will continue to benefit from PT services to maximize LOF       Imer Travis, PT,DPT

## 2019-12-10 NOTE — OCCUPATIONAL THERAPY NOTE
OccupationalTherapy Progress Note     Patient Name: Saranya NUÑEZ Date: 12/10/2019  Problem List  Principal Problem:    Closed right hip fracture [facility])  Active Problems:    Essential hypertension    Closed fracture of neck of right femur ([facility] )            12/10/19 1129   Restrictions/Precautions   Weight Bearing Precautions Per Order Yes   RLE Weight Bearing Per Order WBAT   Other Precautions Fall Risk;Pain;Bed Alarm; Chair Alarm   Pain Assessment   Pain Assessment 0-10   Pain Score 6   Pain Type Acute pain;Surgical pain   Pain Location Hip   Pain Orientation Right   ADL   Where Assessed Chair   Equipment Provided Sock aid;Reacher   LB Dressing Assistance 4  Minimal Assistance   LB Dressing Deficit Steadying;Setup; Requires assistive device for steadying; Thread RLE into pants   Toileting Assistance  4  Minimal Assistance   Toileting Deficit Steadying;Setup;Supervison/safety; Increased time to complete;Clothing management up   Transfers   Sit to Stand 5  Supervision   Stand to Sit 5  Supervision   Stand pivot 5  Supervision   Additional Comments with use of RW and vc'ing for hand placement/safe technique   Cognition   Overall Cognitive Status [facility]   Arousal/Participation Alert; Responsive; Cooperative   Attention Within functional limits   Orientation Level Oriented X4   Following Commands Follows all commands and directions without difficulty   Activity Tolerance   Activity Tolerance Patient limited by pain   Medical Staff Made Aware Spoke with RN, Jessy Salmon   Assessment   Assessment Pt seen for treatment session #1 this date  Pt alert and agreeable to participate in therapy  Treatment session to focus on functional transfers, LB dressing and toileting tasks  Therapist providing Pt with reacher and sock aide  Thearpist demonstrating use of AD then allowing Pt to complete  Pt completing LB dressing (pants) with reacher @ Min A d/t Pt having difficulty lifting RLE   Pt standing and completing CM @ Min A for balance and thorough CM  Pt completing donning socks with sock aide @ S with extended time and vc'ing  Pt completing SPT to commode with use of RW @ S  Pt having a BM and completing thorough posterior pericare @ Mod I  Pt with good performance this date although would benefit from continued review and practice  Pt continues to present with decrease activity tolerance, decrease standing balance, decrease performance during ADL tasks, increased pain, decrease generalized strength, decrease activity engagement and decrease performance during functional transfers  Pt would benefit from continued acute OT service to address deficits as well as [facility] OT (pending progress towards goals)  OT will continue to follow as appropriate  Plan   Treatment Interventions ADL retraining;UE strengthening/ROM; Endurance training;Functional transfer training; Compensatory technique education; Energy conservation; Activityengagement;Continued evaluation   Goal Expiration Date 12/17/19   OT Frequency 3-5x/wk   Recommendation   Recommendation   LONG TERM [facility] OT pending progress towards goals)   OT Discharge Recommendation   [facility] OT pending progress towards goals)        12/10/19 1124   Vital Signs   Pulse 68   Blood Pressure 114/83  (seated in recliner after toileting)   MAP (mmHg) 93   Oxygen Therapy   SpO2 93 %       Pt goals establish on initial evaluation continue to be appropriate at this time, Pt goals to be met by 12/17/19     1  Pt will demonstrate ability to complete supine<>sit @ Mod I in order to increase safety and independence during ADL tasks  2  Pt will demonstrate ability to complete LB dressing @ Mod I with use of AD in order to increase safety and independence during meaningful tasks  3  Pt will demonstrate ability to hattie/doff socks/shoes while sitting EOB @ Mod I with use of AD PRN in order to increase safety and independence during meaningful tasks     4  Pt will demonstrate ability to complete toileting tasks including CM and pericare @ Mod I in order to increase safety and independence during meaningful tasks  5  Pt will demonstrate ability to complete EOB, chair, toilet/commode transfers @ Mod I with use of least restrictive device in order to increase performance and participation during functional tasks  6  Pt will demonstrate ability to stand for 15 minutes while maintaining G balance with use of RW for UB support PRN  7  Pt will demonstrate ability to tolerate 30-35 minute OT session with no vc'ing for deep breathing or use of energy conservation techniques in order to increase activity tolerance during functional tasks  8  Pt will demonstrate Good carryover of use of energy conservation/compensatory strategies during ADLs and functional tasks in order to increase safety and reduce risk for falls  9  Pt will demonstrate Good attention and participation in continued evaluation of functional ambulation house hold distances in order to assist with safe d/c planning    10  Pt will demonstrate 100% carryover of BUE HEP in order to increase BUE MS and increase performance during functional tasks upon d/c home      Dez Barrera OTR/L

## 2019-12-10 NOTE — ASSESSMENT & PLAN NOTE
· Patient had a mechanical fall yesterday  · Right hip fracture-femoral neck  · Status post right hip arthroplasty, pod 1  · Hemoglobin stable, continue Lovenox 40 mg from daily  · PTOT evaluation appreciated  · Discharge patient when cleared by Ortho

## 2019-12-10 NOTE — OCCUPATIONAL THERAPY NOTE
[address] Evaluation     Patient Name: Edis SHELTON Date: 12/10/2019  Problem List  Principal Problem:    Closed right hip fracture [facility])  Active Problems:    Essential hypertension    Closed fracture of neck of right femur [facility])    Past Medical History  Past Medical History:   Diagnosis Date    Essential hypertension     Hyperlipidemia      Past Surgical History  Past Surgical History:   Procedure Laterality Date    BASAL CELL CARCINOMA EXCISION      BREAST SURGERY      Biopsy     HERNIA REPAIR             12/10/19 0848   Note Type   Note type Eval only   Pain Assessment   Pain Assessment 0-10   Pain Score 7   Pain Type Acute pain;Surgical pain   Pain Location Hip   Pain Orientation Right   Home Living   Type of Home House   Home Layout Two level; Laundry in basement;Bed/bath upstairs  (0 BISHNU  full flight to 2nd floor )   Bathroom Shower/Tub Walk-in shower   Bathroom Toilet Raised   Bathroom Equipment Grab bars in [id number] [employer] bars   Additional Comments Pt reports living at home with her  in 2 [address] ewith 0 BISHNU  Prior Function   Level of [place] Independent with ADLs and functional mobility   Lives With Spouse   Receives Help From Family  (shared work)   [id number] [employer] in the last 6 months 1 to 4   Vocational Retired  (recent, teacher, )   Comments Pt reports being I with ADL/IADL tasks + driving  Lifestyle   Autonomy Pt reports being I with ADL/IADL tasks + driving   Reciprocal Relationships Pt reports living at home with her    Service to Others Pt currently retired  Intrinsic Gratification Pt enjoys volunteering at the [employer] and plays in the [employer] band  Psychosocial   Psychosocial (WDL) WDL   ADL   Where Assessed Edge of bed   UB Dressing Assistance 5  Supervision/Setup   LB Dressing Assistance 3  Moderate Assistance   LB Dressing Deficit Steadying; Requires assistive device for steadying;Verbal cueing;Supervision/safety; Increased time to complete; Don/doff R sock; Don/doff L sock; Thread RLE into pants; Thread LLE into pants; Don/doff R shoe;Don/doff L shoe  ([facility]   Bed Mobility   Supine to Sit 3  Moderate assistance   Additional items Assist x 1;LE management;Verbal cues; Increased time required   Transfers   Sit to Stand 5  Supervision   Stand to Sit 5  Supervision   Stand pivot 5  Supervision   Balance   Static Sitting Good   Dynamic Sitting Fair +   Static Standing Fair -   Dynamic Standing Poor +   Activity Tolerance   Activity Tolerance Patient limited by pain; Patient tolerated treatment well   Medical Staff Made Aware Spoke with PTPalak Hunger Assessment   RUE Assessment WFL   LUE Assessment   LUE Assessment WFL   Hand Function   Gross Motor Coordination Functional   Fine Motor Coordination Functional   Cognition   Overall Cognitive Status WFL   Arousal/Participation Alert; Responsive; Cooperative   Attention Within functional limits   Following Commands Follows all commands and directions without difficulty   Assessment   Limitation Decreased ADL status; Decreased UE strength;Decreased Safe judgement during ADL;Decreased endurance;Decreased high-level ADLs   Prognosis Good   Assessment Pt is a 80 y/o F admitted to [address] [place] [address] 12/8/19 after experiencing a fall from a ladder and landing on her R hip  Dx: closed rip hip fracture requiring surgical repair  Pt had R hemiarthroplasty 12/9/19 and is currently WBAT with hip precautions  Pt with PMHx impacting performance during functional tasks including: HTN, hyperlipidemia  Pt reports living in a 2 story home with 0 BISHNU  Pt lives with her  and was previously completing all ADL/IADL tasks @ I with no AD + driving  Pt's bed/bath is on 2nd floor and laundry is in basement  On evaluation, Pt completing supine>sit @ Mod A for LE management  Pt sitting EOB while maintaining G balance   STS and SPT completed with S with use of RW and vc'ing for safe technique and steadying  Pt requiring Max A for LB dressing for socks/shoes and donning pants around feet with out use of AD  Therapist will return at a later time to educate and demonstrate with PT on use of AD  Pt A&Ox4, scoring 55/100 on Barthel index  Pt vitals initally stable upon start of session, after standing Pt's BP drooped  With extended time and reclining in chair and proping up feet, Pt's BP returned to normal range (refer to chart below for readings)  Pt presents with decrease activity tolerance, decrease standing balance, decrease performance during ADL tasks, decrease UB MS, decrease generalized strength, decrease activity engagement and decrease performance during functional transfers  Pt would benefit from continued acute OT services to address deficits as well as  OT upon d/c from [address] [place] [address] pending Pt's progress towards goals  Pt has good support from family at home and demonstrates safe technique  OT will continue to follow Pt as appropriate  Plan   Treatment Interventions ADL retraining;Functional transfer training;UE strengthening/ROM; Endurance training;Equipment evaluation/education; Compensatory technique education;Continued evaluation; Energy conservation; Activityengagement   Goal Expiration Date 12/17/19   OT Frequency 3-5x/wk   Recommendation   Recommendation   LONG TERM ACUTE [facility] OT pending progress towards goals)   OT Discharge Recommendation   [facility] OT pending progress towards goals)   Barthel Index   Feeding 10   Bathing 0   Grooming Score 5   Dressing Score 5   Bladder Score 10   Bowels Score 10   Toilet Use Score 5   Transfers (Bed/Chair) Score 10   Mobility (Level Surface) Score 0   Stairs Score 0   Barthel Index Score 55          12/10/19 0900 12/10/19 0905 12/10/19 0907   Vital Signs   Pulse 66 61 63   Blood Pressure 124/70  (supine in bed) 102/53  (after completing transfer to recliner) 98/54  (seated in recliner for 2 minutes)      12/10/19 0910 12/10/19 8990 12/10/19 0915   Vital Signs   Pulse 64 59 59   Blood Pressure 97/55 95/55  (seated in recliner) 99/57  (seated in recliner 8 minutes post standing)       Pt goals to be met by 12/17/19    1  Pt will demonstrate ability to complete supine<>sit @ Mod I in order to increase safety and independence during ADL tasks  2  Pt will demonstrate ability to complete LB dressing @ Mod I with use of AD in order to increase safety and independence during meaningful tasks  3  Pt will demonstrate ability to hattie/doff socks/shoes while sitting EOB @ Mod I with use of AD PRN in order to increase safety and independence during meaningful tasks  4  Pt will demonstrate ability to complete toileting tasks including CM and pericare @ Mod I in order to increase safety and independence during meaningful tasks  5  Pt will demonstrate ability to complete EOB, chair, toilet/commode transfers @ Mod I with use of least restrictive device in order to increase performance and participation during functional tasks  6  Pt will demonstrate ability to stand for 15 minutes while maintaining G balance with use of RW for UB support PRN  7  Pt will demonstrate ability to tolerate 30-35 minute OT session with no vc'ing for deep breathing or use of energy conservation techniques in order to increase activity tolerance during functional tasks  8  Pt will demonstrate Good carryover of use of energy conservation/compensatory strategies during ADLs and functional tasks in order to increase safety and reduce risk for falls  9  Pt will demonstrate Good attention and participation in continued evaluation of functional ambulation house hold distances in order to assist with safe d/c planning  10  Pt will demonstrate 100% carryover of BUE HEP in order to increase BUE MS and increase performance during functional tasks upon d/c home      Gilman Tio, OTR/L

## 2019-12-10 NOTE — PROGRESS NOTES
Progress Note - Raudel Saxena 1950, 81 y o  female MRN: [id number]    Unit/Bed#: -01 Encounter: [id number]    Primary Care Provider: Shea Tobias MD   Date and time admitted to hospital: 2019 12:59 PM    Essential hypertension  Assessment & Plan  · BP stable, continue home medication on discharge    * Closed right hip fracture [facility])  Assessment & Plan  · Patient had a mechanical fall yesterday  · Right hip fracture-femoral neck  · Status post right hip hemiarthroplasty, pod 1  · Hemoglobin stable, continue Lovenox 40 mg from daily  · PTOT evaluation appreciated    VTE Pharmacologic Prophylaxis:   Pharmacologic: Enoxaparin (Lovenox)    Patient Centered Rounds: I have performed bedside rounds with nursing staff today    Discussions with Specialists or Other Care Team Provider:     Education and Discussions with Family / Patient:     Current Length of Stay: 2 day(s)    Current Patient Status: Inpatient   Certification Statement: The patient will continue to require additional inpatient hospital stay due to Right femur fracture    Discharge Plan:  Tomorrow    Code Status: Level 1 - Full Code      Subjective:   No complaints  Patient's blood pressure dropped during PT OT today  IV fluid hydration given  Patient reassessed and stable vital signs and no complaint  Objective:     Vitals:   Temp (24hrs), Av °F (37 2 °C), Min:97 3 °F (36 3 °C), Max:101 °F (38 3 °C)    Temp:  [97 3 °F (36 3 °C)-101 °F (38 3 °C)] 99 3 °F (37 4 °C)  HR:  [59-77] 77  Resp:  [14-19] 18  BP: ()/(53-83) 141/70  SpO2:  [86 %-98 %] 94 %  Body mass index is 28 73 kg/m²  Input and Output Summary (last 24 hours):        Intake/Output Summary (Last 24 hours) at 12/10/2019 1616  Last data filed at 12/10/2019 0827  Gross per 24 hour   Intake 3090 83 ml   Output 1200 ml   Net 1890 83 ml       Physical Exam:     General appearance: alert  Head: Normocephalic, without obvious abnormality, atraumatic  Lungs: clear to auscultation bilaterally  Heart: regular rate and rhythm  Abdomen: soft, non-tender, positive bowel sounds   Back: negative  Extremities: extremities atraumatic, no cyanosis or edema  Neurologic: Alert and oriented X 3, normal strength and tone  Normal symmetric reflexes  Normal coordination and gait    Additional Data:     Labs:    Results from last 7 days   Lab Units 12/10/19  0512  12/08/19  1403   WBC Thousand/uL 8 94   < > 8 18   HEMOGLOBIN g/dL 11 3*   < > 14 1   HEMATOCRIT % 33 1*   < > 40 8   PLATELETS Thousands/uL 222   < > 285   NEUTROS PCT %  --   --  70   LYMPHS PCT %  --   --  24   MONOS PCT %  --   --  4   EOS PCT %  --   --  1    < > = values in this interval not displayed  Results from last 7 days   Lab Units 12/10/19  0512  12/08/19  1403   SODIUM mmol/L 140   < > 137   POTASSIUM mmol/L 3 8   < > 3 3*   CHLORIDE mmol/L 104   < > 99*   CO2 mmol/L 27   < > 30   BUN mg/dL 13   < > 21   CREATININE mg/dL 0 74   < > 0 86   ANION GAP mmol/L 9   < > 8   CALCIUM mg/dL 7 6*   < > 9 5   ALBUMIN g/dL  --   --  4 0   TOTAL BILIRUBIN mg/dL  --   --  0 32   ALK PHOS U/L  --   --  133*   ALT U/L  --   --  44   AST U/L  --   --  30   GLUCOSE RANDOM mg/dL 125   < > 110    < > = values in this interval not displayed  Results from last 7 days   Lab Units 12/08/19  1403   INR  0 97                       * I Have Reviewed All Lab Data Listed Above  * Additional Pertinent Lab Tests Reviewed:  Donna Kong Admission Reviewed    Imaging:    Imaging Reports Reviewed Today Include: images reviewed    Recent Cultures (last 7 days):           Last 24 Hours Medication List:     Current Facility-Administered Medications:  acetaminophen 975 mg Oral Q8H [address] Jun Diverio   aluminum-magnesium hydroxide-simethicone 30 mL Oral Q6H PRN Jun Diverio   calcium carbonate-vitamin D 1 tablet Oral Daily Jun Diverio   vitamin B-12 500 mcg Oral Daily Jun Diverio   docusate sodium 100 mg Oral BID Fela Vega [START ON 12/11/2019] enoxaparin 40 mg Subcutaneous Daily Jun Gomez   ferrous sulfate 325 mg Oral BID With Meals Jun Gomez   montelukast 10 mg Oral Daily Jun Gomez   morphine injection 2 mg Intravenous Q4H PRN Jun Gomez   multivitamin-minerals 1 tablet Oral Daily Jun Gomez   ondansetron 4 mg Intravenous Once PRN Noberto Fothergill, CRNA   oxyCODONE 2 5 mg Oral Q4H PRN Bebeto Gomez   oxyCODONE 5 mg Oral Q4H PRN Jun Gomez   pantoprazole 40 mg Oral Early Morning Adrian Hua        Today, Patient Was Seen By: Pete Huggins MD    ** Please Note: Dictation voice to text software may have been used in the creation of this document   **

## 2019-12-10 NOTE — PLAN OF CARE
Problem: PHYSICAL THERAPY ADULT  Goal: Performs mobility at highest level of function for planned discharge setting  See evaluation for individualized goals  Description  Treatment/Interventions: Functional transfer training, LE strengthening/ROM, Elevations, Therapeutic exercise, Endurance training, Patient/family training, Bed mobility, Gait training, Spoke to nursing, Spoke to case management, OT, Compensatory technique education  Equipment Recommended: Mat Prader       See flowsheet documentation for full assessment, interventions and recommendations  Note:   Prognosis: Excellent  Problem List: Decreased strength, Decreased endurance, Decreased range of motion, Impaired balance, Decreased mobility, Decreased safety awareness, Orthopedic restrictions, Pain  Assessment: Pt is a 81 y o  female seen for PT evaluation s/p admission to [address] [place] [address] on 12/8/2019 with Closed right hip fracture ([address] )  Pt is now s/p R hemiarthroplasty with posterior hip precautions and is WBAT  Order placed for PT services  Upon evaluation: Pt is presenting with impaired functional mobility due to pain, decreased strength, decreased ROM, decreased endurance, impaired balance, decreased safety awareness, orthopedic restrictions and fall risk requiring moderate assistance for bed mobility and minimal assistance for transfers   Pt's clinical presentation is currently unstable/unpredictable given the functional mobility deficits above, especially weakness, decreased ROM, decreased endurance, pain, decreased activity tolerance, decreased functional mobility tolerance, decreased safety awareness, orthopedic restrictions and significant decrease in BP with pt c/o lightheadedness, coupled with fall risks as indicated by AM-PAC 6-Clicks: 76/29 as well as hx of falls, impaired balance and decreased safety awareness and combined with medical complications of hypotension, pain impacting overall mobility status and need for input for mobility technique/safety  Pt's PMHx and comorbidities that may affect physical performance and progress include: HTN  Personal factors affecting pt at time of IE include: multi-level environment, inability to perform ADLs, inability to navigate level surfaces without external assistance, inability to ambulate household distances and recent fall(s)/fall history  Pt will benefit from continued skilled PT services to address deficits as defined above and to maximize level of functional mobility to facilitate return toward PLOF and improved QOL  From PT/mobility standpoint, recommendation at time of d/c would be Home PT vs  STR pending progress in order to reduce fall risk and maximize pt's functional independence and consistency with mobility in order to facilitate return to PLOF  Recommend ther ex next 1-2 sessions  Recommendation: Home PT, Short-term skilled PT(Home PT versus STR pending progress)          See flowsheet documentation for full assessment

## 2019-12-10 NOTE — PLAN OF CARE
Problem: Potential for Falls  Goal: Patient will remain free of falls  Description  INTERVENTIONS:  - Assess patient frequently for physical needs  -  Identify cognitive and physical deficits and behaviors that affect risk of falls    -  Freedom fall precautions as indicated by assessment   - Educate patient/family on patient safety including physical limitations  - Instruct patient to call for assistance with activity based on assessment  - Modify environment to reduce risk of injury  - Consider OT/PT consult to assist with strengthening/mobility  Outcome: Progressing     Problem: Prexisting or High Potential for Compromised Skin Integrity  Goal: Skin integrity is maintained or improved  Description  INTERVENTIONS:  - Identify patients at risk for skin breakdown  - Assess and monitor skin integrity  - Assess and monitor nutrition and hydration status  - Monitor labs   - Assess for incontinence   - Turn and reposition patient  - Assist with mobility/ambulation  - Relieve pressure over bony prominences  - Avoid friction and shearing  - Provide appropriate hygiene as needed including keeping skin clean and dry  - Evaluate need for skin moisturizer/barrier cream  - Collaborate with interdisciplinary team   - Patient/family teaching   Outcome: Progressing     Problem: PAIN - ADULT  Goal: Verbalizes/displays adequate comfort level or baseline comfort level  Description  Interventions:  - Encourage patient to monitor pain and request assistance  - Assess pain using appropriate pain scale  - Administer analgesics based on type and severity of pain and evaluate response  - Implement non-pharmacological measures as appropriate and evaluate response  - Consider cultural and social influences on pain and pain management  - Notify physician/advanced practitioner if interventions unsuccessful or patient reports new pain  Outcome: Progressing     Problem: INFECTION - ADULT  Goal: Absence or prevention of progression during hospitalization  Description  INTERVENTIONS:  - Assess and monitor for signs and symptoms of infection  - Monitor lab/diagnostic results  - Monitor all insertion sites, i e  indwelling lines, tubes, and drains  - Administer medications as ordered  - Instruct and encourage patient and family to use good hand hygiene technique  - Identify and instruct in appropriate isolation precautions for identified infection/condition   Outcome: Progressing     Problem: Knowledge Deficit  Goal: Patient/family/caregiver demonstrates understanding of disease process, treatment plan, medications, and discharge instructions  Description  Complete learning assessment and assess knowledge base    Interventions:  - Provide teaching at level of understanding  - Provide teaching via preferred learning methods  Outcome: Progressing     Problem: RESPIRATORY - ADULT  Goal: Achieves optimal ventilation and oxygenation  Description  INTERVENTIONS:  - Assess for changes in respiratory status  - Assess for changes in mentation and behavior  - Position to facilitate oxygenation and minimize respiratory effort  - Oxygen administered by appropriate delivery if ordered  - Initiate smoking cessation education as indicated  - Encourage broncho-pulmonary hygiene including cough, deep breathe, Incentive Spirometry  - Assess the need for suctioning and aspirate as needed  - Assess and instruct to report SOB or any respiratory difficulty  - Respiratory Therapy support as indicated  Outcome: Progressing     Problem: METABOLIC, FLUID AND ELECTROLYTES - ADULT  Goal: Electrolytes maintained within normal limits  Description  INTERVENTIONS:  - Monitor labs and assess patient for signs and symptoms of electrolyte imbalances  - Administer electrolyte replacement as ordered  - Monitor response to electrolyte replacements, including repeat lab results as appropriate  - Instruct patient on fluid and nutrition as appropriate  Outcome: Progressing  Goal: Fluid balance maintained  Description  INTERVENTIONS:  - Monitor labs   - Monitor I/O and WT  - Instruct patient on fluid and nutrition as appropriate  - Assess for signs & symptoms of volume excess or deficit  Outcome: Progressing  Goal: Glucose maintained within target range  Description  INTERVENTIONS:  - Monitor Blood Glucose as ordered  - Assess for signs and symptoms of hyperglycemia and hypoglycemia  - Administer ordered medications to maintain glucose within target range  - Assess nutritional intake and initiate nutrition service referral as needed  Outcome: Progressing     Problem: SKIN/TISSUE INTEGRITY - ADULT  Goal: Skin integrity remains intact  Description  INTERVENTIONS  - Identify patients at risk for skin breakdown  - Assess and monitor skin integrity  - Assess and monitor nutrition and hydration status  - Monitor labs (i e  albumin)  - Assess for incontinence   - Turn and reposition patient  - Assist with mobility/ambulation  - Relieve pressure over bony prominences  - Avoid friction and shearing  - Provide appropriate hygiene as needed including keeping skin clean and dry  - Evaluate need for skin moisturizer/barrier cream  - Collaborate with interdisciplinary team (i e  Nutrition, Rehabilitation, etc )   - Patient/family teaching  Outcome: Progressing  Goal: Incision(s), wounds(s) or drain site(s) healing without S/S of infection  Description  INTERVENTIONS  - Assess and document risk factors for skin impairment   - Assess and document dressing, incision, wound bed, drain sites and surrounding tissue  - Consider nutrition services referral as needed  - Oral mucous membranes remain intact  - Provide patient/ family education  Outcome: Progressing  Goal: Oral mucous membranes remain intact  Description  INTERVENTIONS  - Assess oral mucosa and hygiene practices  - Implement preventative oral hygiene regimen  - Implement oral medicated treatments as ordered  - Initiate Nutrition services referral as needed  Outcome: Progressing     Problem: HEMATOLOGIC - ADULT  Goal: Maintains hematologic stability  Description  INTERVENTIONS  - Assess for signs and symptoms of bleeding or hemorrhage  - Monitor labs  - Administer supportive blood products/factors as ordered and appropriate  Outcome: Progressing     Problem: MUSCULOSKELETAL - ADULT  Goal: Maintain or return mobility to safest level of function  Description  INTERVENTIONS:  - Assess patient's ability to carry out ADLs; assess patient's baseline for ADL function and identify physical deficits which impact ability to perform ADLs (bathing, care of mouth/teeth, toileting, grooming, dressing, etc )  - Assess/evaluate cause of self-care deficits   - Assess range of motion  - Assess patient's mobility  - Assess patient's need for assistive devices and provide as appropriate  - Encourage maximum independence but intervene and supervise when necessary  - Involve family in performance of ADLs  - Assess for home care needs following discharge   - Consider OT consult to assist with ADL evaluation and planning for discharge  - Provide patient education as appropriate  Outcome: Progressing  Goal: Maintain proper alignment of affected body part  Description  INTERVENTIONS:  - Support, maintain and protect limb and body alignment  - Provide patient/ family with appropriate education  Outcome: Progressing

## 2019-12-10 NOTE — PLAN OF CARE
Problem: PHYSICAL THERAPY ADULT  Goal: Performs mobility at highest level of function for planned discharge setting  See evaluation for individualized goals  Description  Treatment/Interventions: Functional transfer training, LE strengthening/ROM, Elevations, Therapeutic exercise, Endurance training, Patient/family training, Bed mobility, Gait training, Spoke to nursing, Spoke to case management, OT, Compensatory technique education  Equipment Recommended: Mat Prader       See flowsheet documentation for full assessment, interventions and recommendations  [id number] by Graeme Madrid, PT  Outcome: Progressing  Note:   Prognosis: Excellent  Problem List: Decreased strength, Decreased range of motion, Decreased endurance, Impaired balance, Decreased safety awareness, Decreased mobility, Orthopedic restrictions, Pain  Assessment: Pt tolerated session well progressing transfers to supervision and able to trial ambulation  She progressed ambulation within session from [address] initially for wt shifting to close supervision with min VCs for improved gait pattern and min VCs for safety with RW  She is limited by pain, decreased endurance and balance  She will continue to benefit from PT services to maximize LOF  Recommendation: Home PT, Short-term skilled PT(pending progress)          See flowsheet documentation for full assessment

## 2019-12-11 VITALS
RESPIRATION RATE: 19 BRPM | HEART RATE: 72 BPM | SYSTOLIC BLOOD PRESSURE: 133 MMHG | OXYGEN SATURATION: 94 % | TEMPERATURE: 99.6 F | HEIGHT: 65 IN | WEIGHT: 172.62 LBS | DIASTOLIC BLOOD PRESSURE: 75 MMHG | BODY MASS INDEX: 28.76 KG/M2

## 2019-12-11 PROBLEM — D50.0 BLOOD LOSS ANEMIA: Status: ACTIVE | Noted: 2019-12-11

## 2019-12-11 LAB
HCT VFR BLD AUTO: 32.3 % (ref 34.8–46.1)
HGB BLD-MCNC: 10.9 G/DL (ref 11.5–15.4)

## 2019-12-11 PROCEDURE — 99024 POSTOP FOLLOW-UP VISIT: CPT | Performed by: ORTHOPAEDIC SURGERY

## 2019-12-11 PROCEDURE — 99239 HOSP IP/OBS DSCHRG MGMT >30: CPT | Performed by: INTERNAL MEDICINE

## 2019-12-11 PROCEDURE — 85018 HEMOGLOBIN: CPT | Performed by: INTERNAL MEDICINE

## 2019-12-11 PROCEDURE — 97110 THERAPEUTIC EXERCISES: CPT

## 2019-12-11 PROCEDURE — 97116 GAIT TRAINING THERAPY: CPT

## 2019-12-11 PROCEDURE — 97530 THERAPEUTIC ACTIVITIES: CPT

## 2019-12-11 PROCEDURE — 85014 HEMATOCRIT: CPT | Performed by: INTERNAL MEDICINE

## 2019-12-11 RX ORDER — FERROUS SULFATE 325(65) MG
325 TABLET ORAL 2 TIMES DAILY WITH MEALS
Qty: 30 TABLET | Refills: 0 | Status: SHIPPED | OUTPATIENT
Start: 2019-12-11 | End: 2021-01-18

## 2019-12-11 RX ORDER — OXYCODONE HYDROCHLORIDE 5 MG/1
10 TABLET ORAL EVERY 4 HOURS PRN
Qty: 10 TABLET | Refills: 0 | Status: SHIPPED | OUTPATIENT
Start: 2019-12-11 | End: 2019-12-21

## 2019-12-11 RX ADMIN — FERROUS SULFATE TAB 325 MG (65 MG ELEMENTAL FE) 325 MG: 325 (65 FE) TAB at 16:23

## 2019-12-11 RX ADMIN — Medication 1 TABLET: at 08:00

## 2019-12-11 RX ADMIN — DOCUSATE SODIUM 100 MG: 100 CAPSULE, LIQUID FILLED ORAL at 08:01

## 2019-12-11 RX ADMIN — OXYCODONE HYDROCHLORIDE 5 MG: 5 TABLET ORAL at 16:23

## 2019-12-11 RX ADMIN — OXYCODONE HYDROCHLORIDE 5 MG: 5 TABLET ORAL at 12:08

## 2019-12-11 RX ADMIN — PANTOPRAZOLE SODIUM 40 MG: 40 TABLET, DELAYED RELEASE ORAL at 05:16

## 2019-12-11 RX ADMIN — ENOXAPARIN SODIUM 40 MG: 40 INJECTION SUBCUTANEOUS at 06:56

## 2019-12-11 RX ADMIN — Medication 1 TABLET: at 08:01

## 2019-12-11 RX ADMIN — OXYCODONE HYDROCHLORIDE 5 MG: 5 TABLET ORAL at 05:15

## 2019-12-11 RX ADMIN — FERROUS SULFATE TAB 325 MG (65 MG ELEMENTAL FE) 325 MG: 325 (65 FE) TAB at 08:00

## 2019-12-11 RX ADMIN — CYANOCOBALAMIN TAB 500 MCG 500 MCG: 500 TAB at 08:00

## 2019-12-11 RX ADMIN — ACETAMINOPHEN 975 MG: 325 TABLET ORAL at 16:23

## 2019-12-11 RX ADMIN — ACETAMINOPHEN 975 MG: 325 TABLET ORAL at 08:00

## 2019-12-11 NOTE — SOCIAL WORK
CM received call from [facility] stating they do not accept pt insurance  CM spoke with pt for additional choices  Pt asking for a referral to be placed to [address] referral sent  CM confirmed that pt has a rolling walker and a ride home  [id number]--CM received message that [employer] is also OON for pt insurance  Will f/u for additional choices  [facility] referral was placed to [facility]

## 2019-12-11 NOTE — SOCIAL WORK
CM f/u with pt for [place] [address] choices  Pt wishes for referral to be made to [facility] referral sent  Pt also agreeable to review [facility] choices if the recommendation is for rehab after time of d/c  Pt to call mother in law for other [place] [address] agencies she might want to use if [facility] cannot accept

## 2019-12-11 NOTE — NURSING NOTE
Pt meets discharge criteria, IV removed  Reviewed medications, exit care, and discharge instructions with pt and pt's , Bebe Mina; both verbalized understanding  All questions answered  Paperwork given to the pt

## 2019-12-11 NOTE — PLAN OF CARE
Problem: PHYSICAL THERAPY ADULT  Goal: Performs mobility at highest level of function for planned discharge setting  See evaluation for individualized goals  Description  Treatment/Interventions: Functional transfer training, LE strengthening/ROM, Elevations, Therapeutic exercise, Endurance training, Patient/family training, Bed mobility, Gait training, Spoke to nursing, Spoke to case management, OT, Compensatory technique education  Equipment Recommended: [place]       See flowsheet documentation for full assessment, interventions and recommendations  07/65/1403 [id number] by Erna Croft, PT  Outcome: Progressing  Note:   Prognosis: Excellent  Problem List: Decreased strength, Decreased range of motion, Decreased endurance, Impaired balance, Decreased mobility, Pain, Orthopedic restrictions  Assessment: Pt tolerated session well despite increased pain with mobility R hip  She progressed transfers this date to mod (I) and ambulated with supervision with minimal cues for improved gait pattern and increased activity tolerance with increased distance  She requires increased time to complete mobility  She performed bed mobility with decreased assistance  She was able to trial stairs this date with close supervision  She is making significant progress with skilled PT services  Reviewed HEP for supine and seated exercises with handout provided  Patient demonstrated proper completion  She will continue to benefit from PT services to maximize LOF  Continue to recommend HHPT upon D/C with transition to outpatient PT services when appropriate  Recommendation: Home PT          See flowsheet documentation for full assessment

## 2019-12-11 NOTE — DISCHARGE SUMMARY
Discharge- Lina Pineda 1950, 81 y o  female MRN: [id number]    Unit/Bed#: -01 Encounter: [id number]    Primary Care Provider: Saadia Brooks MD   Date and time admitted to hospital: 12/8/2019 12:59 PM    * Closed right hip fracture [facility])  Assessment & Plan  · Patient had a mechanical fall yesterday  · Right hip fracture-femoral neck  · Status post right hip arthroplasty  · Hemoglobin stable, continue Lovenox 40 mg from daily for three weeks post op  · Discharge to home with home PT   · Continue to follow with orthopedic as an outpatient  Blood loss anemia  Assessment & Plan  Blood loss anemia 2/2 hip surgery  Continue iron sulphate twice daily  Recheck Hgb after 3 weeks    Essential hypertension  Assessment & Plan  · BP stable, continue home medication on discharge    Discharging Physician / Practitioner: Yoav Galindo MD  PCP: Saadia Brooks MD  Admission Date:   Admission Orders (From admission, onward)     Ordered        12/08/19 1355  Inpatient Admission (expected length of stay for this patient Order details is greater than two midnights)  Once                   Discharge Date: 12/11/19    Disposition:      [address]  at home with home PT    For Discharges to [facility]:   · Not Applicable to this Patient - Not Applicable to this Patient    Reason for Admission: hip fracture    Discharge Diagnoses:     Please see assessment and plan section above for further details regarding discharge diagnoses  Resolved Problems  Date Reviewed: 12/9/2019    None          Consultations During Hospital Stay:  Vitaly Bees TO ORTHOPEDIC SURGERY  IP CONSULT TO CASE MANAGEMENT     Procedures Performed:   Procedure(s) (LRB):  HEMIARTHROPLASTY HIP (BIPOLAR) (Right)      Xr Chest 2 Views    Result Date: 12/9/2019  Narrative: CHEST INDICATION:   pre op  Right hip fracture   COMPARISON:  None EXAM PERFORMED/VIEWS:  XR CHEST PA & LATERAL FINDINGS: Cardiomediastinal silhouette appears unremarkable  The lungs are clear  No pneumothorax or pleural effusion  Osseous structures appear within normal limits for patient age  Impression: No acute cardiopulmonary disease  Workstation performed: [id number]     Xr Hip/pelv 1 Vw Right If Performed    Result Date: 12/10/2019  Narrative: RIGHT HIP INDICATION:   Hemiarthroplasty  COMPARISON:  12/8/2009 VIEWS:  XR HIP/PELV 1 VW RIGHT  W PELVIS IF PERFORMED FINDINGS: There is no acute fracture or dislocation  Right hip arthroplasty is identified in satisfactory position without evidence of hardware complication  No lytic or blastic osseous lesions  Soft tissues are unremarkable  The visualized lumbar spine is unremarkable  Impression: Unremarkable appearance of right hip arthroplasty  Workstation performed: [id number]     Xr Femur 2 Views Right    Result Date: 12/9/2019  Narrative: RIGHT FEMUR INDICATION:   fall  COMPARISON:  None VIEWS:  XR FEMUR 2 VW RIGHT FINDINGS: Right subcapital hip fracture with slight lateral angulation of fracture fragments  Moderate right hip and right knee degenerative change noted  No lytic or blastic lesions are seen  Soft tissues are unremarkable  Impression: Right subcapital hip fracture noted  Also refer to follow-up CT chest dated 12/8/2019 Workstation performed: [id number]     Ct Head Without Contrast    Result Date: 12/8/2019  Narrative: CT BRAIN - WITHOUT CONTRAST INDICATION:   Fall  COMPARISON:  None  TECHNIQUE:  CT examination of the brain was performed  In addition to axial images, coronal 2D reformatted images were created and submitted for interpretation  Radiation dose length product (DLP) for this visit:  885 mGy-cm   This examination, like all CT scans performed in the [facility], was performed utilizing techniques to minimize radiation dose exposure, including the use of iterative reconstruction and automated exposure control  IMAGE QUALITY:  Diagnostic   FINDINGS: PARENCHYMA: Decreased attenuation is noted in periventricular and subcortical white matter demonstrating an appearance that is statistically most likely to represent mild microangiopathic change  Symmetric bilateral basal ganglia calcifications  No CT signs of acute infarction  No intracranial mass, mass effect or midline shift  No acute parenchymal hemorrhage  VENTRICLES AND EXTRA-AXIAL SPACES:  Normal for the patient's age  VISUALIZED ORBITS AND PARANASAL SINUSES:  Unremarkable  CALVARIUM AND EXTRACRANIAL SOFT TISSUES:  Normal      Impression: No acute intracranial abnormality  Workstation performed: [id number]     Xr Pelvis Ap Only 1 Or 2 Vw    Result Date: 12/9/2019  Narrative: PELVIS INDICATION:   fall  COMPARISON:  None VIEWS:  XR PELVIS AP ONLY 1 OR 2 VW FINDINGS: Right subcapital hip fracture  Lower lumbar and bilateral hip degenerative change noted  No lytic or blastic lesions are seen  Soft tissues are unremarkable  Degenerative changes visualized lower lumbar spine  Impression: Right subcapital hip fracture  Workstation performed: [id number]     Ct Hip Right Without Contrast    Result Date: 12/8/2019  Narrative: CT right hip without IV contrast INDICATION: Fracture  COMPARISON: Plain film dated 12/8/2019 TECHNIQUE: CT examination of the right hip was performed  This examination, like all CT scans performed in the [facility], was performed utilizing techniques to minimize radiation dose exposure, including the use of iterative reconstruction and automated exposure control software  Sagittal and coronal two dimensional reconstructed images were also submitted for interpretation  Rad dose  518 mGy-cm FINDINGS: OSSEOUS STRUCTURES:  Comminuted impacted right subcapital hip fracture with slight anterior angulation at the fracture site  Moderate right hip degenerative osteoarthritis noted with productive spurring  No additional fracture  VISUALIZED MUSCULATURE:  Unremarkable   SOFT TISSUES:  Unremarkable  OTHER PERTINENT FINDINGS:  None  Impression: Comminuted slightly impacted right subcapital hip fracture including mild anterior angulation at the fracture site  No additional fracture identified  Workstation performed: [id number]        Medication Adjustments and Discharge Medications:  · Summary of Medication Adjustments made as a result of this hospitalization: Lovenox 40 mg for 3 weeks after OR day  · Medication Dosing Tapers - Please refer to Discharge Medication List for details on any medication dosing tapers (if applicable to patient)  · Discharge Medication List: See after visit summary for reconciled discharge medications  Wound Care Recommendations:  When applicable, please see wound care section of After Visit Summary  Diet Recommendations at Discharge:  Diet -        Diet Orders   (From admission, onward)             Start     Ordered    12/09/19 1938  Diet Regular; Regular House  Diet effective now     Question Answer Comment   Diet Type Regular    Regular Regular House    RD to adjust diet per protocol? Yes        12/09/19 1938                  Incidental Findings:   · none     Test Results Pending at Discharge (will require follow up):   · none         Hospital Course:     Travis Moore is a 81 y o  female patient who originally presented to the hospital on 12/8/2019 due to right hip fracture  Patient underwent right hemiarthroplasty  Smooth post op course  Patient needs to follow up with orthopedic surgery on 12/20/2019  Continue Lovenox 40 mg subcutaneous inj daily for 3 weeks  Pain control with tylenol and oxycodone  Follow with PCP with one week post discharge  Non significant acute blood loss anemia on iron tablets  Follow up CBC with PCP as an outpatient  Continue PT and patient is stable to be discharged home  Condition at Discharge: stable     Discharge Day Visit / Exam:     Subjective:  No compliant    Vitals: Blood Pressure: 133/75 (12/11/19 9842)  Pulse: 72 (12/11/19 0802)  Temperature: 99 6 °F (37 6 °C) (12/11/19 0802)  Temp Source: Oral (12/08/19 1302)  Respirations: 19 (12/11/19 0802)  Height: 5' 5" (165 1 cm) (12/08/19 1302)  Weight - Scale: 78 3 kg (172 lb 9 9 oz) (12/09/19 1939)  SpO2: 94 % (12/11/19 0802)  Exam:     General appearance: alert  Head: Normocephalic, without obvious abnormality, atraumatic  Lungs: clear to auscultation bilaterally  Heart: regular rate and rhythm  Abdomen: soft, non-tender, positive bowel sounds   Back: negative  Extremities: extremities atraumatic, no cyanosis or edema  Neurologic: Alert and oriented X 3, normal strength and tone  Normal symmetric reflexes  Normal coordination and gait      Discharge instructions/Information to patient and family:   See after visit summary section titled Discharge Instructions for information provided to patient and family  Planned Readmission: no      Discharge Statement:  I spent 35 minutes discharging the patient  This time was spent on the day of discharge  I had direct contact with the patient on the day of discharge  Greater than 50% of the total time was spent examining patient, answering all patient questions, arranging and discussing plan of care with patient as well as directly providing post-discharge instructions  Additional time then spent on discharge activities      ** Please Note: This note has been constructed using a voice recognition system **

## 2019-12-11 NOTE — PHYSICAL THERAPY NOTE
PHYSICAL THERAPY TREATMENT NOTE  NAME:  Raudel Saxena  DATE: 12/11/19    Length Of Stay: 3  Performed at least 2 patient identifiers during session: Name and Birthday    TREATMENT:        12/11/19 1305   Pain Assessment   Pain Assessment 0-10   Pain Score No Pain  (with ambulation and mobility 5-6/10)   Pain Location Hip   Pain Orientation Right   Precautions   Total Hip Replacement ADduction; Flexion; Internal rotation   Restrictions/Precautions   RLE Weight Bearing Per Order WBAT   Other Precautions Pain; Fall Risk   Cognition   Orientation Level Oriented X4   Subjective   Subjective "I feel better today "   Bed Mobility   Supine to Sit   (close supervision)   Additional items Increased time required   Sit to Supine 4  Minimal assistance   Additional items Increased time required;LE management  (R LE management)   Additional Comments HOB elevated > 30 degrees  completed bed mobility supine to sit with close supervision with min VCs for technique and sequence  sit to supine with Sandra of R LE with min VCs for positioning, tehcnique and sequence  Discussed utilizing sheet or looped belt/leg  to facilitate R LE management into bed  Pt verbalized understanding  declined trial with sheet  Transfers   Sit to Stand 6  Modified independent   Additional items Increased time required   Stand to Sit 6  Modified independent   Additional items Increased time required   Stand pivot 5  Supervision   Additional items Increased time required  (w RW  dec stance R LE)   Ambulation/Elevation   Gait pattern Wide TONJA; Decreased R stance; Excessively slow  (step to progressing to step through)   Gait Assistance 5  Supervision   Additional items Verbal cues  (min VCs for R swing through tehcnique, R knee flx  heel/toe)   Assistive Device Rolling walker   Distance 130'x2 step to progressing to step through with min cues for gait pattern with R L swing through and heel/toe pattern   Stair Management Assistance 5 Supervision  (close)   Additional items Increased time required  (reviewed sequence prior to completion)   Stair Management Technique Two rails; One rail R;Step to pattern  (2 steps B HR, 2 steps R HR  L LE ascend, R LE descend)   Number of Stairs 4   Balance   Static Sitting Normal   Dynamic Sitting Good   Static Standing Fair +   Dynamic Standing Fair +   Ambulatory Fair +   Activity Tolerance   Activity Tolerance Patient limited by pain; Patient limited by fatigue   Medical Staff Made Aware Spoke with Yari RUFFIN Horde   Exercises   Quad Sets Supine;5 reps;Bilateral   Heelslides Supine;Right;10 reps;AAROM  (pt completing 75% of movement)   Glute Sets Supine;5 reps;Bilateral   Hip Abduction Supine;10 reps;AAROM; Right  (pt completing 75% of ROM)   Knee AROM Short Arc Quad Supine;5 reps;Right;AROM   Ankle Pumps Supine;15 reps;Bilateral   Assessment   Prognosis Excellent   Problem List Decreased strength;Decreased range of motion;Decreased endurance; Impaired balance;Decreased mobility;Pain;Orthopedic restrictions   Goals   Patient Goals "Go home"   PT Treatment Day 2   Plan   Treatment/Interventions Functional transfer training;LE strengthening/ROM; Elevations; Therapeutic exercise; Endurance training;Patient/family training;Bed mobility;Gait training;Spoke to nursing;Spoke to case management;OT   Progress Progressing toward goals   PT Frequency Other (Comment)  (5x/week M-F, one time sat or sun)   Recommendation   Recommendation Home PT   Equipment Recommended Walker       Pt tolerated session well despite increased pain with mobility R hip  She progressed transfers this date to mod (I) and ambulated with supervision with minimal cues for improved gait pattern and increased activity tolerance with increased distance  She requires increased time to complete mobility  She performed bed mobility with decreased assistance  She was able to trial stairs this date with close supervision   She is making significant progress with skilled PT services  She was able to recall 3/3 hip precautions  Reviewed HEP for supine and seated exercises with handout provided  Patient demonstrated proper completion  She will continue to benefit from PT services to maximize LOF  Continue to recommend HHPT upon D/C with transition to outpatient PT services when appropriate      Evelia Alfred, PT,DPT

## 2019-12-11 NOTE — PROGRESS NOTES
Progress Note - Orthopedics   Kimberley Thibodeaux 81 y o  female MRN: [id number]  Unit/Bed#: -01 Encounter: [id number]    Assessment:  POD 2  Hemiarthroplasty right hip; ambulatory dysfunction; acute blood loss anemia-stable; obesity    Plan:  Continue PT/OT  Stable for discharge from my perspective  If discharged, follow up 12/20/2019 at my office  Continue Lovenox for 3 weeks postoperatively  Right hip dressing removed postop day 7 and then dressing may be reapplied if needed or incision kept open to the air  Weight bearing:  WBAT right lower extremity    VTE Pharmacologic Prophylaxis: Enoxaparin (Lovenox)  VTE Mechanical Prophylaxis: sequential compression device    Subjective:  Bhavesh Munson is doing well, reporting pain primarily with activity, indicating pain different from her preoperative symptoms  She denies lower extremity paresthesias  She is somewhat anxious about being discharged to home    Vitals: Blood pressure 133/75, pulse 72, temperature 99 6 °F (37 6 °C), resp  rate 19, height 5' 5" (1 651 m), weight 78 3 kg (172 lb 9 9 oz), SpO2 94 %  ,Body mass index is 28 73 kg/m²  Intake/Output Summary (Last 24 hours) at 12/11/2019 0839  Last data filed at 12/11/2019 0801  Gross per 24 hour   Intake 1080 ml   Output 450 ml   Net 630 ml       Invasive Devices     Peripheral Intravenous Line            Peripheral IV 12/08/19 Right Antecubital 2 days                Physical Exam:  Bhavesh Munson is alert and oriented and seems to be in no distress, examined in bed  The dressings, right hip, demonstrate some bloody drainage but are intact  Distal sensation is intact and she is actively moving her ankles and feet without difficulty  Calf compartments are soft and nontender and Homans sign is negative        Lab, Imaging and other studies:   CBC:   Lab Results   Component Value Date    HGB 10 9 (L) 12/11/2019    HCT 32 3 (L) 12/11/2019     CMP: No results found for: SODIUM, CL, CO2, ANIONGAP, BUN, CREATININE, GLUCOSE, CALCIUM, AST, ALT, ALKPHOS, PROT, BILITOT, EGFR

## 2019-12-11 NOTE — ASSESSMENT & PLAN NOTE
· Patient had a mechanical fall yesterday  · Right hip fracture-femoral neck  · Status post right hip arthroplasty  · Hemoglobin stable, continue Lovenox 40 mg from daily for three weeks post op  · Discharge to home with home PT   · Continue to follow with orthopedic as an outpatient

## 2019-12-11 NOTE — PLAN OF CARE
Problem: Potential for Falls  Goal: Patient will remain free of falls  Description  INTERVENTIONS:  - Assess patient frequently for physical needs  -  Identify cognitive and physical deficits and behaviors that affect risk of falls    -  [place] fall precautions as indicated by assessment   - Educate patient/family on patient safety including physical limitations  - Instruct patient to call for assistance with activity based on assessment  - Modify environment to reduce risk of injury  - Consider OT/PT consult to assist with strengthening/mobility  Outcome: Progressing     Problem: Prexisting or High Potential for Compromised Skin Integrity  Goal: Skin integrity is maintained or improved  Description  INTERVENTIONS:  - Identify patients at risk for skin breakdown  - Assess and monitor skin integrity  - Assess and monitor nutrition and hydration status  - Monitor labs   - Assess for incontinence   - Turn and reposition patient  - Assist with mobility/ambulation  - Relieve pressure over bony prominences  - Avoid friction and shearing  - Provide appropriate hygiene as needed including keeping skin clean and dry  - Evaluate need for skin moisturizer/barrier cream  - Collaborate with interdisciplinary team   - Patient/family teaching   Outcome: Progressing     Problem: PAIN - ADULT  Goal: Verbalizes/displays adequate comfort level or baseline comfort level  Description  Interventions:  - Encourage patient to monitor pain and request assistance  - Assess pain using appropriate pain scale  - Administer analgesics based on type and severity of pain and evaluate response  - Implement non-pharmacological measures as appropriate and evaluate response  - Consider cultural and social influences on pain and pain management  - Notify physician/advanced practitioner if interventions unsuccessful or patient reports new pain  Outcome: Progressing     Problem: INFECTION - ADULT  Goal: Absence or prevention of progression during hospitalization  Description  INTERVENTIONS:  - Assess and monitor for signs and symptoms of infection  - Monitor lab/diagnostic results  - Monitor all insertion sites, i e  indwelling lines, tubes, and drains  - Administer medications as ordered  - Instruct and encourage patient and family to use good hand hygiene technique  - Identify and instruct in appropriate isolation precautions for identified infection/condition   Outcome: Progressing     Problem: Knowledge Deficit  Goal: Patient/family/caregiver demonstrates understanding of disease process, treatment plan, medications, and discharge instructions  Description  Complete learning assessment and assess knowledge base    Interventions:  - Provide teaching at level of understanding  - Provide teaching via preferred learning methods  Outcome: Progressing     Problem: RESPIRATORY - ADULT  Goal: Achieves optimal ventilation and oxygenation  Description  INTERVENTIONS:  - Assess for changes in respiratory status  - Assess for changes in mentation and behavior  - Position to facilitate oxygenation and minimize respiratory effort  - Oxygen administered by appropriate delivery if ordered  - Initiate smoking cessation education as indicated  - Encourage broncho-pulmonary hygiene including cough, deep breathe, Incentive Spirometry  - Assess the need for suctioning and aspirate as needed  - Assess and instruct to report SOB or any respiratory difficulty  - Respiratory Therapy support as indicated  Outcome: Progressing     Problem: METABOLIC, FLUID AND ELECTROLYTES - ADULT  Goal: Electrolytes maintained within normal limits  Description  INTERVENTIONS:  - Monitor labs and assess patient for signs and symptoms of electrolyte imbalances  - Administer electrolyte replacement as ordered  - Monitor response to electrolyte replacements, including repeat lab results as appropriate  - Instruct patient on fluid and nutrition as appropriate  Outcome: Progressing  Goal: Fluid balance maintained  Description  INTERVENTIONS:  - Monitor labs   - Monitor I/O and WT  - Instruct patient on fluid and nutrition as appropriate  - Assess for signs & symptoms of volume excess or deficit  Outcome: Progressing  Goal: Glucose maintained within target range  Description  INTERVENTIONS:  - Monitor Blood Glucose as ordered  - Assess for signs and symptoms of hyperglycemia and hypoglycemia  - Administer ordered medications to maintain glucose within target range  - Assess nutritional intake and initiate nutrition service referral as needed  Outcome: Progressing     Problem: SKIN/TISSUE INTEGRITY - ADULT  Goal: Skin integrity remains intact  Description  INTERVENTIONS  - Identify patients at risk for skin breakdown  - Assess and monitor skin integrity  - Assess and monitor nutrition and hydration status  - Monitor labs (i e  albumin)  - Assess for incontinence   - Turn and reposition patient  - Assist with mobility/ambulation  - Relieve pressure over bony prominences  - Avoid friction and shearing  - Provide appropriate hygiene as needed including keeping skin clean and dry  - Evaluate need for skin moisturizer/barrier cream  - Collaborate with interdisciplinary team (i e  Nutrition, Rehabilitation, etc )   - Patient/family teaching  Outcome: Progressing  Goal: Incision(s), wounds(s) or drain site(s) healing without S/S of infection  Description  INTERVENTIONS  - Assess and document risk factors for skin impairment   - Assess and document dressing, incision, wound bed, drain sites and surrounding tissue  - Consider nutrition services referral as needed  - Oral mucous membranes remain intact  - Provide patient/ family education  Outcome: Progressing  Goal: Oral mucous membranes remain intact  Description  INTERVENTIONS  - Assess oral mucosa and hygiene practices  - Implement preventative oral hygiene regimen  - Implement oral medicated treatments as ordered  - Initiate Nutrition services referral as needed  Outcome: Progressing     Problem: HEMATOLOGIC - ADULT  Goal: Maintains hematologic stability  Description  INTERVENTIONS  - Assess for signs and symptoms of bleeding or hemorrhage  - Monitor labs  - Administer supportive blood products/factors as ordered and appropriate  Outcome: Progressing     Problem: MUSCULOSKELETAL - ADULT  Goal: Maintain or return mobility to safest level of function  Description  INTERVENTIONS:  - Assess patient's ability to carry out ADLs; assess patient's baseline for ADL function and identify physical deficits which impact ability to perform ADLs (bathing, care of mouth/teeth, toileting, grooming, dressing, etc )  - Assess/evaluate cause of self-care deficits   - Assess range of motion  - Assess patient's mobility  - Assess patient's need for assistive devices and provide as appropriate  - Encourage maximum independence but intervene and supervise when necessary  - Involve family in performance of ADLs  - Assess for home care needs following discharge   - Consider OT consult to assist with ADL evaluation and planning for discharge  - Provide patient education as appropriate  Outcome: Progressing  Goal: Maintain proper alignment of affected body part  Description  INTERVENTIONS:  - Support, maintain and protect limb and body alignment  - Provide patient/ family with appropriate education  Outcome: Progressing     Problem: SAFETY ADULT  Goal: Maintain or return to baseline ADL function  Description  INTERVENTIONS:  -  Assess patient's ability to carry out ADLs; assess patient's baseline for ADL function and identify physical deficits which impact ability to perform ADLs (bathing, care of mouth/teeth, toileting, grooming, dressing, etc )  - Assess/evaluate cause of self-care deficits   - Assess range of motion  - Assess patient's mobility; develop plan if impaired  - Assess patient's need for assistive devices and provide as appropriate  - Encourage maximum independence but intervene and supervise when necessary  - Involve family in performance of ADLs  - Assess for home care needs following discharge   - Consider OT consult to assist with ADL evaluation and planning for discharge  - Provide patient education as appropriate  Outcome: Progressing  Goal: Maintain or return mobility status to optimal level  Description  INTERVENTIONS:  - Assess patient's baseline mobility status (ambulation, transfers, stairs, etc )    - Identify cognitive and physical deficits and behaviors that affect mobility  - Identify mobility aids required to assist with transfers and/or ambulation (gait belt, sit-to-stand, lift, walker, cane, etc )  - [place] fall precautions as indicated by assessment  - Record patient progress and toleration of activity level on Mobility SBAR; progress patient to next Phase/Stage  - Instruct patient to call for assistance with activity based on assessment  - Consider rehabilitation consult to assist with strengthening/weightbearing, etc   Outcome: Progressing     Problem: DISCHARGE PLANNING  Goal: Discharge to home or other facility with appropriate resources  Description  INTERVENTIONS:  - Identify barriers to discharge w/patient and caregiver  - Arrange for needed discharge resources and transportation as appropriate  - Identify discharge learning needs (meds, wound care, etc )  - Arrange for interpretive services to assist at discharge as needed  - Refer to Case Management Department for coordinating discharge planning if the patient needs post-hospital services based on physician/advanced practitioner order or complex needs related to functional status, cognitive ability, or social support system  Outcome: Progressing

## 2019-12-12 ENCOUNTER — TRANSITIONAL CARE MANAGEMENT (OUTPATIENT)
Dept: FAMILY MEDICINE CLINIC | Facility: CLINIC | Age: 69
End: 2019-12-12

## 2019-12-12 NOTE — SOCIAL WORK
[facility] contacted [facility] to f/u if referral had been received  Referral was received the were checking insurance coverage and availability of PT services  Received call that [facility] was able to accept pt  Discussed with pt the denial by both other agencies d/t being out of network with her insurance  Informed pt [facility] was able to accept pt and had immediate availability  Pt agreeable to [facility]  Atteding doctor and bedside nurse notified that pt was d/c ready from [facility]

## 2019-12-12 NOTE — UTILIZATION REVIEW
Notification of Discharge  This is a Notification of Discharge from our facility [address]  Please be advised that this patient has been discharge from our facility  Below you will find the admission and discharge date and time including the patients disposition  PRESENTATION DATE: 12/8/2019 12:59 PM  OBS ADMISSION DATE:   IP ADMISSION DATE: 12/8/19 1355   DISCHARGE DATE: 12/11/2019  4:40 PM  DISPOSITION: [facility]   Admission Orders listed below:  Admission Orders (From admission, onward)     Ordered        12/08/19 1355  Inpatient Admission (expected length of stay for this patient Order details is greater than two midnights)  Once                   Please contact the UR Department if additional information is required to close this patient's authorization/case  [address] Utilization Review Department  Main: [phone] x carefully listen to the prompts  All voicemails are confidential   [email or web address]  Send all requests for admission clinical reviews, approved or denied determinations and any other requests to dedicated fax number below belonging to the campus where the patient is receiving treatment   List of dedicated fax numbers:  [address] DENIALS (Administrative/Medical Necessity) [phone]   [address]  (Maternity/NICU/Pediatrics) [phone]   [facility] [phone]   Álvaro Alba [phone]   [address] [place] [address] [phone]   [facility] [phone]   Naomie Sow [phone]   Gilda Oseguera [phone]   [place] [facility] [phone]

## 2019-12-13 ENCOUNTER — TELEPHONE (OUTPATIENT)
Dept: OBGYN CLINIC | Facility: HOSPITAL | Age: 69
End: 2019-12-13

## 2019-12-13 LAB
ATRIAL RATE: 68 BPM
P AXIS: 74 DEGREES
PR INTERVAL: 160 MS
QRS AXIS: -45 DEGREES
QRSD INTERVAL: 78 MS
QT INTERVAL: 436 MS
QTC INTERVAL: 463 MS
T WAVE AXIS: 71 DEGREES
VENTRICULAR RATE: 68 BPM

## 2019-12-13 PROCEDURE — 93010 ELECTROCARDIOGRAM REPORT: CPT | Performed by: INTERNAL MEDICINE

## 2019-12-13 NOTE — TELEPHONE ENCOUNTER
Patient had surgery with Dr Himanshu Babcock yesterday  A message was left with the answering service stating that : PT REPORTING INCREASED SWELLING FROM KNEE RADIATING UP   WARM TO [facility]

## 2019-12-13 NOTE — TELEPHONE ENCOUNTER
[facility] [address] PT called in  Cb# [phone]    He is requesting clarification on the wound care  Please advise

## 2019-12-13 NOTE — TELEPHONE ENCOUNTER
I spoke with Pt  Who  Stated Dr Karrie Peña called her last night and answered all her questions and concerns  She is feeling better, symptoms has not increased

## 2019-12-16 ENCOUNTER — OFFICE VISIT (OUTPATIENT)
Dept: FAMILY MEDICINE CLINIC | Facility: CLINIC | Age: 69
End: 2019-12-16
Payer: COMMERCIAL

## 2019-12-16 VITALS
BODY MASS INDEX: 28.66 KG/M2 | DIASTOLIC BLOOD PRESSURE: 84 MMHG | WEIGHT: 172 LBS | SYSTOLIC BLOOD PRESSURE: 134 MMHG | HEIGHT: 65 IN

## 2019-12-16 DIAGNOSIS — I10 ESSENTIAL HYPERTENSION: Chronic | ICD-10-CM

## 2019-12-16 DIAGNOSIS — S72.001A CLOSED FRACTURE OF RIGHT HIP, INITIAL ENCOUNTER (HCC): Primary | ICD-10-CM

## 2019-12-16 DIAGNOSIS — D50.0 BLOOD LOSS ANEMIA: ICD-10-CM

## 2019-12-16 DIAGNOSIS — K21.00 GASTRO-ESOPHAGEAL REFLUX DISEASE WITH ESOPHAGITIS: Chronic | ICD-10-CM

## 2019-12-16 PROCEDURE — 99496 TRANSJ CARE MGMT HIGH F2F 7D: CPT | Performed by: FAMILY MEDICINE

## 2019-12-16 NOTE — PROGRESS NOTES
TCM Call (since 11/15/2019)     Date and time call was made  12/12/2019 11:35 AM    Hospital care reviewed  Records reviewed    Patient was hospitialized at  Other (comment)    Comment  [facility]    Date of Admission  12/08/19    Date of discharge  12/11/19    Diagnosis  Closed right hip fracture     Disposition  Home    Were the patients medications reviewed and updated  No    Current Symptoms  None      TCM Call (since 11/15/2019)     Post hospital issues  None    Should patient be enrolled in anticoag monitoring? No    Scheduled for follow up?   Yes    Patients specialists  Other (comment)    Other specialists names  Dr Arnoldo Moreira    Did you obtain your prescribed medications  Yes    Do you need help managing your prescriptions or medications  No    Is transportation to your appointment needed  No    I have advised the patient to call PCP with any new or worsening symptoms  [facility] [address] [address] or [facility] other    Support System  Spouse    The type of support provided  Physical; Emotional; Financial    Do you have social support  Yes, as much as I need    Are you recieving any outpatient services  Yes    What type of services  physical therapy    Are you recieving home care services  No    Are you using any community resources  No    Current waiver services  No    Have you fallen in the last 12 months  Yes    How many times  1    Interperter language line needed  No    Counseling  Patient

## 2019-12-16 NOTE — PATIENT INSTRUCTIONS
Over all patient seems to be doing very well post surgery  Continues to have some problems with pain in the incision area with sitting may try using a dual doughnut types eating arrangement but I do think sleeping in the recliner is a good idea  Push daily walking and her physical therapy at home will probably need some follow-up physical therapy in department  I do think this could probably be done in [place]    Reviewed reports and reconciled medication

## 2019-12-16 NOTE — PROGRESS NOTES
Assessment/Plan:    Essential hypertension  Blood pressure stable today continue current regimen    Closed right hip fracture [facility])  Reviewed hospital reports and reconciled medication overall seems to be doing well continue home physical therapy and should follow up with in facility physical therapy but I think she may be able to do this if going down to [place]  Is going to check if they want her to continue the Lovenox beyond 10 days    Gastro-esophageal reflux disease with esophagitis  Overall doing well continue current regimen    Blood loss anemia  Appears stable continue current supplements       Diagnoses and all orders for this visit:    Closed fracture of right hip, initial encounter ([facility] [address] )    Essential hypertension    Blood loss anemia    Gastro-esophageal reflux disease with esophagitis          Subjective:      Patient ID: Elliot Emerson is a 81 y o  female  Patient had fallen and fractured her right hip and had surgery for repair  Is using walker at this time and pain is much better although still had been taking a Percocet at bedtime  Is getting home physical therapy  No change in medication otherwise  Does have a lot of pain in the incision area in her buttocks when trying to sit and has been doing some sleeping in recliner      The following portions of the patient's history were reviewed and updated as appropriate: allergies, current medications, past medical history, past social history, past surgical history and problem list     Review of Systems   Constitutional: Negative for chills and fever  HENT: Negative for congestion  Eyes: Negative for visual disturbance  Respiratory: Negative for cough, chest tightness and shortness of breath  Cardiovascular: Positive for leg swelling (Slight swelling in the right lower leg)  Negative for chest pain and palpitations  Gastrointestinal: Negative for abdominal pain and constipation  Endocrine: Negative for polyuria     Genitourinary: Negative for dysuria  Musculoskeletal: Positive for arthralgias ( mild discomfort in the right hip) and myalgias ( in the area of the buttocks)  Skin: Positive for wound ( has large incision area in the buttocks)  Negative for rash  Neurological: Negative for dizziness, light-headedness and headaches  Hematological: Negative for adenopathy  Psychiatric/Behavioral: Positive for sleep disturbance ( some difficulty sleeping due to discomfort in the right hip area)  Objective:      /84 (BP Location: Left arm, Patient Position: Sitting, Cuff Size: Standard)   Ht 5' 5" (1 651 m)   Wt 78 kg (172 lb)   BMI 28 62 kg/m²          Physical Exam   Constitutional: She appears well-developed and well-nourished  HENT:   Head: Normocephalic  Nose: Nose normal    Mouth/Throat: Oropharynx is clear and moist    Eyes: Conjunctivae are normal    Neck: Neck supple  No thyromegaly present  Cardiovascular: Normal rate, regular rhythm and normal heart sounds  No murmur (Rate is 72 no bruits are noted) heard  Pulmonary/Chest: Effort normal and breath sounds normal    Abdominal: Soft  Bowel sounds are normal  She exhibits no distension and no mass  There is no tenderness  Musculoskeletal: She exhibits no edema  Lymphadenopathy:     She has no cervical adenopathy  Neurological: She is alert  She displays normal reflexes  Coordination abnormal    Skin: Skin is warm and dry  Psychiatric: She has a normal mood and affect  Her behavior is normal  Judgment and thought content normal    Nursing note and vitals reviewed

## 2019-12-17 NOTE — ASSESSMENT & PLAN NOTE
Reviewed hospital reports and reconciled medication overall seems to be doing well continue home physical therapy and should follow up with in facility physical therapy but I think she may be able to do this if going down to [place]    Is going to check if they want her to continue the Lovenox beyond 10 days

## 2019-12-20 ENCOUNTER — OFFICE VISIT (OUTPATIENT)
Dept: OBGYN CLINIC | Facility: CLINIC | Age: 69
End: 2019-12-20

## 2019-12-20 VITALS
HEART RATE: 71 BPM | SYSTOLIC BLOOD PRESSURE: 133 MMHG | DIASTOLIC BLOOD PRESSURE: 80 MMHG | HEIGHT: 65 IN | BODY MASS INDEX: 29.97 KG/M2 | WEIGHT: 179.9 LBS | RESPIRATION RATE: 18 BRPM

## 2019-12-20 DIAGNOSIS — Z96.641 PRESENCE OF RIGHT ARTIFICIAL HIP JOINT: ICD-10-CM

## 2019-12-20 DIAGNOSIS — S72.001D CLOSED FRACTURE OF NECK OF RIGHT FEMUR WITH ROUTINE HEALING, SUBSEQUENT ENCOUNTER: Primary | ICD-10-CM

## 2019-12-20 PROCEDURE — 99024 POSTOP FOLLOW-UP VISIT: CPT | Performed by: ORTHOPAEDIC SURGERY

## 2019-12-20 NOTE — PROGRESS NOTES
Patient Name:  Larry Madrid  MRN:  [id number]    Assessment     1  Closed fracture of neck of right femur with routine healing, subsequent encounter     2  Presence of right artificial hip joint         Plan     1  I would recommend follow-up on 12/23/2019  Her staples will be removed at that time  I offered to see her in 1 week but she would prefer to have the staples removed as soon as possible  I have reassured her that I saw no cause for concern in regards to her continued soreness and difficulty with her right lower extremity  Also, she indicates that her right lower extremity seems to be somewhat longer than her left  Clinically I do not see evidence of significant leg-length discrepancy in the x-rays do not seem to demonstrate any significant asymmetry of leg length in assessing the lesser trochanter to the inferior aspect of the ischial tuberosity  She states that she only has 1 dose of Lovenox remaining  A prescription for an additional 10 doses was sent to her pharmacy to complete a 3 week course of treatment  She is taking Tylenol for pain and I have encouraged her to continue doing so  She does not feel the need for narcotics at this time  Return in about 3 days (around 12/23/2019)  Subjective   Larry Madrid returns for follow-up of her right hip hemiarthroplasty  The patient is 11 day(s) post op and returns for routine follow-up  Patient Notes persistent buttock and incisional soreness with pain radiating down the lateral aspect of her thigh in the anterior thigh  She notes that physical therapy is somewhat difficult for in tends to increase her pain  She is continuing to have some difficulty sleeping  She has been sleeping in a recliner up until tonight when she plans to try to get to her bedroom  She denies any  lightheadedness, dizziness, chest pain or shortness of breath    She does believe that her right lower extremity seems somewhat longer than her left       Objective     /80 (BP Location: Left arm, Patient Position: Sitting, Cuff Size: Large)   Pulse 71   Resp 18   Ht 5' 5" (1 651 m)   Wt 81 6 kg (179 lb 14 3 oz)   BMI 29 94 kg/m²     The exam demonstrates the incision to be benign  Staples are in place  There is no erythema, rubor or discharge  She denies any complaints with knee or hip range of motion  When tested in a sitting and a supine position, leg lengths appear to be grossly equal   Distal sensation is intact  Calf compartments are soft and nontender          Christopher Cogan

## 2019-12-23 ENCOUNTER — OFFICE VISIT (OUTPATIENT)
Dept: OBGYN CLINIC | Facility: CLINIC | Age: 69
End: 2019-12-23

## 2019-12-23 VITALS
BODY MASS INDEX: 29.97 KG/M2 | DIASTOLIC BLOOD PRESSURE: 78 MMHG | HEART RATE: 65 BPM | HEIGHT: 65 IN | WEIGHT: 179.9 LBS | SYSTOLIC BLOOD PRESSURE: 137 MMHG | RESPIRATION RATE: 18 BRPM

## 2019-12-23 DIAGNOSIS — S72.001D CLOSED FRACTURE OF NECK OF RIGHT FEMUR WITH ROUTINE HEALING, SUBSEQUENT ENCOUNTER: Primary | ICD-10-CM

## 2019-12-23 DIAGNOSIS — Z96.641 PRESENCE OF RIGHT ARTIFICIAL HIP JOINT: ICD-10-CM

## 2019-12-23 PROCEDURE — 99024 POSTOP FOLLOW-UP VISIT: CPT | Performed by: ORTHOPAEDIC SURGERY

## 2019-12-23 NOTE — PROGRESS NOTES
Patient Name:  Penny Marie  MRN:  [id number]    Assessment     1  Closed fracture of neck of right femur with routine healing, subsequent encounter  Ambulatory referral to Physical Therapy   2  Presence of right artificial hip joint  Ambulatory referral to Physical Therapy       Plan     1  I would recommend follow-up in 4 weeks  She and her  do plan to leave for [place], as they do every winter  However, they have not yet made their plans to do so  If they decide to leave for [place] prior to 4 weeks, they may contact the office for a follow-up prior to their departure  Outpatient therapy should begin and a prescription was provided  Return in about 4 weeks (around 1/20/2020)  Subjective   Penny Marie returns for follow-up of her right hip  The patient is 2 week(s) post hemiarthroplasty and returns for routine follow-up  Patient Notes some improvement in her pain  She continues to have discomfort  She does note some improvement in mobility  She anticipates beginning outpatient physical therapy after the holiday  Objective     /78 (BP Location: Left arm, Patient Position: Sitting, Cuff Size: Large)   Pulse 65   Resp 18   Ht 5' 5" (1 651 m)   Wt 81 6 kg (179 lb 14 4 oz)   BMI 29 94 kg/m²     The exam demonstrates the incision to be benign  Staples were removed without difficulty and a light dressing was applied  There is no drainage, erythema or rubor  She ambulates with a walker without difficulty      Mary Victor

## 2020-01-06 ENCOUNTER — EVALUATION (OUTPATIENT)
Dept: PHYSICAL THERAPY | Facility: CLINIC | Age: 70
End: 2020-01-06
Payer: COMMERCIAL

## 2020-01-06 DIAGNOSIS — Z96.641 PRESENCE OF RIGHT ARTIFICIAL HIP JOINT: ICD-10-CM

## 2020-01-06 DIAGNOSIS — S72.001D CLOSED FRACTURE OF NECK OF RIGHT FEMUR WITH ROUTINE HEALING, SUBSEQUENT ENCOUNTER: ICD-10-CM

## 2020-01-06 PROCEDURE — 97110 THERAPEUTIC EXERCISES: CPT | Performed by: PHYSICAL THERAPIST

## 2020-01-06 PROCEDURE — 97161 PT EVAL LOW COMPLEX 20 MIN: CPT | Performed by: PHYSICAL THERAPIST

## 2020-01-06 NOTE — PROGRESS NOTES
PT Evaluation     Today's date: 2020  Patient name: Rene Marques  : 1950  MRN: [id number]  Referring provider: Annamaria Dean DO  Dx:   Encounter Diagnosis     ICD-10-CM    1  Closed fracture of neck of right femur with routine healing, subsequent encounter S72 001D Ambulatory referral to Physical Therapy   2  Presence of right artificial hip joint Z96 641 Ambulatory referral to Physical Therapy                  Assessment  Assessment details: Elias Reeder is a 81year old female presenting to PT s/p R artifical hip joint replacement due to a fall at [facility] on 19  Pt reports falling on tile floor and immediately feeling pain in R LE  Pt is currently experiencing deficits including abnormal gait pattern, strength deficits in LE, ROM limitations, and poor activity tolerance  Patient would benefit from skilled PT services to address these impairments and to maximize function in order to improve quality of life  Thank you for the referral   Impairments: abnormal gait, abnormal muscle tone, abnormal or restricted ROM, activity intolerance, impaired balance, impaired physical strength, lacks appropriate home exercise program and pain with function    Symptom irritability: moderateUnderstanding of Dx/Px/POC: good   Prognosis: good    Goals  STG  1) R hip ROM will improve 10 degrees in 2 weeks  2) R hip strength will improve 1/2 grade in 3 weeks  LTG  1) Patient is independent in HEP  2) Patient will ascend/descend one flight of stairs independently with no increased pain by D/C   3) Ambulation will be improved to PLOF by D/C   4) Pt will travel to [place] without the use of an assistive device by the end of January         Plan  Patient would benefit from: skilled physical therapy  Planned modality interventions: H-Wave, high voltage pulsed current: pain management, cryotherapy and TENS  Planned therapy interventions: manual therapy, massage, balance, strengthening, stretching, patient education, neuromuscular re-education, abdominal trunk stabilization, therapeutic exercise, therapeutic activities, therapeutic training, functional ROM exercises, flexibility, home exercise program and gait training  Frequency: 3x week  Duration in weeks: 4  Treatment plan discussed with: patient        Subjective Evaluation    History of Present Illness  Date of onset: 2019  Date of surgery: 2019  Mechanism of injury: trauma  Mechanism of injury: On the , 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[employer], 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 and fell on tile floor  Pt had immediate pain and she was unable to move R leg  Pt did go to the hospital that day, she got surgery the following day  Pt has been getting home health PT; she stated that she was improving and making progress  Pt reports dull achy pain in her whole R leg, thinks that it is soreness from therapy last week  Notes that she has been able to use a SPC and sometimes at home she has been walking I without an assistive device  Her goal is to ambulate without assistance  Pt has been trying to sleep in her bed, but gets uncomfortable and moves to the recliner  Pt denies any numbness/tingling in LE  She states that she has difficulty putting pants on her R LE at times, but is doing much better  Pt states that she goes down to [place] for the winter and wants to be down there by the end of January  She sees her doctor on the  to see if he clears her to go     Quality of life: good    Pain  Current pain ratin  At best pain ratin  At worst pain ratin  Location: R hip joint, into her R knee and thigh   Quality: dull ache  Relieving factors: ice and medications  Aggravating factors: stair climbing, standing and walking    Social Support  Steps to enter house: yes  Stairs in house: yes (one flight to bedroom)   Lives in: multiple-level home  Lives with: spouse      Diagnostic Tests  No diagnostic tests performed  Treatments  Previous treatment: physical therapy (was in home PT)  Patient Goals  Patient goals for therapy: decreased pain, improved balance, increased motion, increased strength and independence with ADLs/IADLs          Objective     Observations     Right Hip  Positive for edema and incision  Negative for drainage  Neurological Testing     Sensation     Hip   Left Hip   Intact: light touch    Right Hip   Intact: light touch    Reflexes   Left   Patellar (L4): normal (2+)  Achilles (S1): normal (2+)    Right   Patellar (L4): normal (2+)  Achilles (S1): normal (2+)    Active Range of Motion   Left Hip   Normal active range of motion    Right Hip   Flexion: 85 degrees   Abduction: 30 degrees     Additional Active Range of Motion Details  Did not assess hip IR/ER/Add  Strength/Myotome Testing     Left Hip   Normal muscle strength    Right Hip   Planes of Motion   Flexion: 4-  Abduction: 4-  Adduction: 4-    Ambulation     Ambulation: Level Surfaces   Ambulation with assistive device: independent    Additional Level Surfaces Ambulation Details  Using quad  Cane currently  Antalgic gait pattern noted, decreased step length, poor heel toe pattern on R LE, decreased speed, and decreased knee extension due to pain in thigh  Ambulation: Stairs     Additional Stairs Ambulation Details  Pt able to perform step up on 4" step with railing  Comments   Pt unable to balance on R LE without external support  Pt was able to balance on L LE for 10 seconds I without losing her balance  Precautions: follow postlateral hip precautions        Daily Treatment Diary     Manual  1/6       STM anterior thigh- R LE        Foam roll IT band         Scar mobility                            Exercise Diary  1/6       Bike          SLR x 3  Flex   2x10        HR/TR 3x10        Mini squats  2x10        Weight shifting 10x standing       Standing anabell        Step-up        Step-down        richy Blue TB 2x10        LAQ        SAQ        bridges        Standing hip abd  Standing hip ext                                                  Gait training 5'                   Modalities 1/6       CP prn

## 2020-01-08 ENCOUNTER — OFFICE VISIT (OUTPATIENT)
Dept: PHYSICAL THERAPY | Facility: CLINIC | Age: 70
End: 2020-01-08
Payer: COMMERCIAL

## 2020-01-08 DIAGNOSIS — Z96.641 PRESENCE OF RIGHT ARTIFICIAL HIP JOINT: ICD-10-CM

## 2020-01-08 DIAGNOSIS — S72.001D CLOSED FRACTURE OF NECK OF RIGHT FEMUR WITH ROUTINE HEALING, SUBSEQUENT ENCOUNTER: Primary | ICD-10-CM

## 2020-01-08 PROCEDURE — 97112 NEUROMUSCULAR REEDUCATION: CPT | Performed by: PHYSICAL THERAPIST

## 2020-01-08 PROCEDURE — 97110 THERAPEUTIC EXERCISES: CPT | Performed by: PHYSICAL THERAPIST

## 2020-01-08 NOTE — PROGRESS NOTES
Daily Note     Today's date: 2020  Patient name: Chemo Grayson  : 1950  MRN: [id number]  Referring provider: Kayleigh Mitchell DO  Dx:   Encounter Diagnosis     ICD-10-CM    1  Closed fracture of neck of right femur with routine healing, subsequent encounter S72 001D    2  Presence of right artificial hip joint Z96 641                   Subjective: Pt states that she continues to have muscle tightness in anterior thigh and down into her calf  She has been still walking with a quad  Cane, but thinks that she can start to use a SPC  Objective: See treatment diary below      Assessment: Tolerated treatment well  She needed cueing for proper form and intensity this session  She was able to perform all exercise without increased pain  Pt continues to demonstrate antalgic gait pattern, but noted relief in thigh following manual foam roll and STM  Patient demonstrated fatigue post treatment and would benefit from continued PT to return pt to PLOF  Plan: Continue per plan of care  Precautions: follow postlateral hip precautions        Daily Treatment Diary     Manual        STM anterior thigh- R LE  arb      Foam roll IT band   arb      Scar mobility        Knee/ hip ROM  Arb                  Exercise Diary        Bike    7' L1       SLR x 3  Flex  2x10  Flex 2x10       HR/TR 3x10  3x10      Mini squats  2x10  20x      Weight shifting 10x standing -      Standing marches  2x10 ea holding on railing      Step-up  6" 2x10      Step-down  4" 2x10      clamshells Blue TB 2x10  Blue TB 2x10       LAQ  3x10 #2      SAQ  3x10 #2      bridges  20x       Standing hip abd  2x10      Standing hip ext  2x10 #1                                              Gait training 5' 5'                  Modalities       CP prn  Def

## 2020-01-10 ENCOUNTER — OFFICE VISIT (OUTPATIENT)
Dept: PHYSICAL THERAPY | Facility: CLINIC | Age: 70
End: 2020-01-10
Payer: COMMERCIAL

## 2020-01-10 DIAGNOSIS — S72.001D CLOSED FRACTURE OF NECK OF RIGHT FEMUR WITH ROUTINE HEALING, SUBSEQUENT ENCOUNTER: Primary | ICD-10-CM

## 2020-01-10 DIAGNOSIS — Z96.641 PRESENCE OF RIGHT ARTIFICIAL HIP JOINT: ICD-10-CM

## 2020-01-10 PROCEDURE — 97140 MANUAL THERAPY 1/> REGIONS: CPT | Performed by: PHYSICAL THERAPIST

## 2020-01-10 PROCEDURE — 97110 THERAPEUTIC EXERCISES: CPT | Performed by: PHYSICAL THERAPIST

## 2020-01-10 NOTE — PROGRESS NOTES
Daily Note     Today's date: 1/10/2020  Patient name: Chemo Grayson  : 1950  MRN: [id number]  Referring provider: Kayleigh Mitchell DO  Dx:   Encounter Diagnosis     ICD-10-CM    1  Closed fracture of neck of right femur with routine healing, subsequent encounter S72 001D    2  Presence of right artificial hip joint Z96 641                   Subjective: Pt states that her thigh pain has improved, but continues to have increased soreness in groin on the R  Pt was ambulating with SPC this session  Objective: See treatment diary below      Assessment: Tolerated treatment well  Pt states that she continues to have difficulty with step over step pattern on the steps due to the achy pain that she feels in her groin/thigh  Pt complained of groin pain during SLR at home; PT educated pt on proper form during exercise  She tends to lose knee extension half way through due to fatigue, which causes increased groin pain  She was able to perform 15x today before form started to decline  Patient demonstrated fatigue post treatment and would benefit from continued PT to address current limitations and return pt to PLOF  Plan: Continue per plan of care  Precautions: follow postlateral hip precautions        Daily Treatment Diary     Manual  1/6 1/8 1/10      STM anterior thigh- R LE  arb arb      Foam roll IT band   arb Arb- ant thigh/IT band      Scar mobility         Knee/ hip ROM  Arb arb                   Exercise Diary  1/6 1/8 1/10      Bike    7' L1  8' L1      SLR x 3  Flex  2x10  Flex 2x10  Flex 15x p! HR/TR 3x10  3x10 3x10      Mini squats  2x10  20x 30x       Weight shifting 10x standing - 20x       Standing marches  2x10 ea holding on railing 2x10       Step-up  6" 2x10 4" 2x10      Step-down  4" 2x10 4" 2x10      clamshells Blue TB 2x10  Blue TB 2x10  Blue 2x10      LAQ  3x10 #2 3x10 #2 #2 5     SAQ  3x10 #2 3x10 #2 #2 5     bridges  20x  20x      Standing hip abd    2x10 3x10 #1 Standing hip ext  2x10 #1 3x10 #1                                                  Gait training 5' 5' -                   Modalities 1/6 1/8 1/10     CP prn  Def  Def

## 2020-01-13 ENCOUNTER — APPOINTMENT (OUTPATIENT)
Dept: RADIOLOGY | Facility: CLINIC | Age: 70
End: 2020-01-13
Payer: COMMERCIAL

## 2020-01-13 ENCOUNTER — OFFICE VISIT (OUTPATIENT)
Dept: PHYSICAL THERAPY | Facility: CLINIC | Age: 70
End: 2020-01-13
Payer: COMMERCIAL

## 2020-01-13 ENCOUNTER — OFFICE VISIT (OUTPATIENT)
Dept: OBGYN CLINIC | Facility: CLINIC | Age: 70
End: 2020-01-13

## 2020-01-13 VITALS
RESPIRATION RATE: 16 BRPM | BODY MASS INDEX: 29.82 KG/M2 | SYSTOLIC BLOOD PRESSURE: 143 MMHG | HEIGHT: 65 IN | DIASTOLIC BLOOD PRESSURE: 85 MMHG | WEIGHT: 179 LBS | HEART RATE: 61 BPM

## 2020-01-13 DIAGNOSIS — Z96.641 PRESENCE OF RIGHT ARTIFICIAL HIP JOINT: ICD-10-CM

## 2020-01-13 DIAGNOSIS — S72.001D CLOSED FRACTURE OF NECK OF RIGHT FEMUR WITH ROUTINE HEALING, SUBSEQUENT ENCOUNTER: Primary | ICD-10-CM

## 2020-01-13 DIAGNOSIS — S72.001D CLOSED FRACTURE OF NECK OF RIGHT FEMUR WITH ROUTINE HEALING, SUBSEQUENT ENCOUNTER: ICD-10-CM

## 2020-01-13 PROCEDURE — 99024 POSTOP FOLLOW-UP VISIT: CPT | Performed by: ORTHOPAEDIC SURGERY

## 2020-01-13 PROCEDURE — 97112 NEUROMUSCULAR REEDUCATION: CPT | Performed by: PHYSICAL THERAPIST

## 2020-01-13 PROCEDURE — 73502 X-RAY EXAM HIP UNI 2-3 VIEWS: CPT

## 2020-01-13 PROCEDURE — 97110 THERAPEUTIC EXERCISES: CPT | Performed by: PHYSICAL THERAPIST

## 2020-01-13 NOTE — PROGRESS NOTES
Daily Note     Today's date: 2020  Patient name: Idalmis Mohr  : 1950  MRN: [id number]  Referring provider: Steve Hsu DO  Dx:   Encounter Diagnosis     ICD-10-CM    1  Closed fracture of neck of right femur with routine healing, subsequent encounter S72 001D    2  Presence of right artificial hip joint Z96 641                   Subjective: Pt  Notes anterior hip pain remains  She hopes to discuss with Dr Matias Arauz  Following therapy, pain is reduced and at a much less intensity with walking and CKC exercises  Objective: See treatment diary below      Assessment: Pt  Is demonstrating reduced core activation and pelvic motor control during there-ex, causing IR and contralateral hip drop with exercise which seems to be contirbuting to pain  When this was addressed, pain reduced  Pt  Able to perform protocol-based exercises today without issue  She would benefit from continued PT  Plan: Continue per plan of care  Continue with post op protocol  Precautions: follow postlateral hip precautions        Daily Treatment Diary     Manual  1/6 1/8 1/10 1/13     STM anterior thigh- R LE  arb arb      Foam roll IT band   arb Arb- ant thigh/IT band ITB 5', quads 5'     Scar mobility         Knee/ hip ROM  Arb arb                   Exercise Diary  1/6 1/8 1/10 1/13     Bike    7' L1  8' L1 NV     SLR x 3  Flex  2x10  Flex 2x10  Flex 15x p! 30x     HR/TR 3x10  3x10 3x10 3x10     Mini squats  2x10  20x 30x  30x     Weight shifting 10x standing - 20x  -     Standing marches  2x10 ea holding on railing 2x10  30x     Step-up  6" 2x10 4" 2x10 4   30x min A TC     Step-down  4" 2x10 4" 2x10 -     clamshells Blue TB 2x10  Blue TB 2x10  Blue 2x10 Blue 30x     LAQ  3x10 #2 3x10 #2 #2 5 20x     SAQ  3x10 #2 3x10 #2 #2 5 20x     bridges  20x  20x 20x     Standing hip abd  2x10 3x10 20x     Standing hip ext    2x10 #1 3x10 20x                                                  Gait training 5' 5' - 5' Modalities 1/6 1/8 1/10     CP prn  Def  Def

## 2020-01-13 NOTE — PROGRESS NOTES
Patient Name:  Arnoldo Arauz  MRN:  [id number]    Assessment     1  Closed fracture of neck of right femur with routine healing, subsequent encounter  XR hip/pelv 2-3 vws right if performed   2  Presence of right artificial hip joint  XR hip/pelv 2-3 vws right if performed       Plan     1  I would recommend follow-up within the next 3-4 weeks  These she and her  plan to travel to [place] in the next week and will remain there until the spring  I have recommended that they seek follow-up with an orthopedic surgeon in [place]  They have already made some arrangements to continue physical therapy  However, I do think they should see an orthopedic surgeon as well  I have encouraged him to contact me if any questions or concerns arise prior to their departure  I would recommend follow-up with me upon their return in the spring  Return for Follow-up after return from [place]  Subjective   Arnoldo Arauz returns for follow-up of her right hip status post hemiarthroplasty  The patient is 5 week(s) post surgery and returns with concerns regarding some right groin pain  She noted onset last week after she started getting more aggressive with her exercise program, specifically some straight leg raising exercises  She also was progress to on assisted ambulation  Today, in physical therapy, she noted some improvement in her symptoms but she does continue to have some right groin pain  She denies pain at rest and denies pain with activity, other than trying to stand or walk  Objective     /85 (BP Location: Right arm, Patient Position: Sitting, Cuff Size: Large)   Pulse 61   Resp 16   Ht 5' 5" (1 651 m)   Wt 81 2 kg (179 lb)   BMI 29 79 kg/m²     The exam demonstrates no pain during range of motion of the right hip  She complained of a slight degree of medial sided groin pain on the right during resisted adduction    However, she denies any complaints during resisted range of motion, otherwise  She stands and ambulates without significant difficulty ambulating with cane assistance  Passive range of motion of the right hip elicits no complaints  Data Review     I have personally reviewed pertinent films in PACS demonstrating stable hemiarthroplasty without change from original images  There is some questionable ossification noted on the lateral image, possible ossification of the ileus psoas      Margarita Dalton

## 2020-01-15 ENCOUNTER — OFFICE VISIT (OUTPATIENT)
Dept: PHYSICAL THERAPY | Facility: CLINIC | Age: 70
End: 2020-01-15
Payer: COMMERCIAL

## 2020-01-15 DIAGNOSIS — S72.001D CLOSED FRACTURE OF NECK OF RIGHT FEMUR WITH ROUTINE HEALING, SUBSEQUENT ENCOUNTER: Primary | ICD-10-CM

## 2020-01-15 DIAGNOSIS — Z96.641 PRESENCE OF RIGHT ARTIFICIAL HIP JOINT: ICD-10-CM

## 2020-01-15 PROCEDURE — 97112 NEUROMUSCULAR REEDUCATION: CPT

## 2020-01-15 PROCEDURE — 97110 THERAPEUTIC EXERCISES: CPT

## 2020-01-15 NOTE — PROGRESS NOTES
Daily Note     Today's date: 1/15/2020  Patient name: Cydney Almanza  : 1950  MRN: [id number]  Referring provider: Ana Cristina Mcginnis DO  Dx:   Encounter Diagnosis     ICD-10-CM    1  Closed fracture of neck of right femur with routine healing, subsequent encounter S72 001D    2  Presence of right artificial hip joint Z96 641                   Subjective: Patient reports pain in groin area continues but has lessened  Reports saw orthopod who assured her everything looks good  Objective: See treatment diary below      Assessment: Cydney Almanza continues with good  progress towards goals  Patient pain appears to be decreasing   she required moderate verbal cueing to complete exercises appropriate form and intensity  Patient pain should resolve with continued treatments  Plan: Continue per plan of care  Precautions: follow postlateral hip precautions        Daily Treatment Diary      Manual  1/6 1/8 1/10 1/13 1/15    STM anterior thigh- R LE  arb arb      Foam roll IT band   arb Arb- ant thigh/IT band ITB 5', quads 5' ITB 5', quads 5'  SJ    Scar mobility         Knee/ hip ROM  Arb arb                   Exercise Diary  1/6 1/8 1/10 1/13 1/15    Bike    7' L1  8' L1 NV 10' L1    SLR x 3  Flex  2x10  Flex 2x10  Flex 15x p! 30x 30x    HR/TR 3x10  3x10 3x10 3x10 3x10    Mini squats  2x10  20x 30x  30x 30x    Weight shifting 10x standing - 20x  -     Standing marches  2x10 ea holding on railing 2x10  30x 30x    Step-up  6" 2x10 4" 2x10 4   30x min A TC 4   10x min A TC    Step-down  4" 2x10 4" 2x10 -     clamshells Blue TB 2x10  Blue TB 2x10  Blue 2x10 Blue 30x blue x30    LAQ  3x10 #2 3x10 #2 #2 5 20x 2 5# 30x    SAQ  3x10 #2 3x10 #2 #2 5 20x 2 5# 30x    bridges  20x  20x 20x 30x    Standing hip abd  2x10 3x10 20x 20x ea    Standing hip ext  2x10 #1 3x10 20x 20x ea                                                 Gait training 5' 5' - 5'                  Modalities 1/6 1/8 1/10     CP prn  Def  Def

## 2020-01-17 ENCOUNTER — OFFICE VISIT (OUTPATIENT)
Dept: PHYSICAL THERAPY | Facility: CLINIC | Age: 70
End: 2020-01-17
Payer: COMMERCIAL

## 2020-01-17 DIAGNOSIS — Z96.641 PRESENCE OF RIGHT ARTIFICIAL HIP JOINT: ICD-10-CM

## 2020-01-17 DIAGNOSIS — S72.001D CLOSED FRACTURE OF NECK OF RIGHT FEMUR WITH ROUTINE HEALING, SUBSEQUENT ENCOUNTER: Primary | ICD-10-CM

## 2020-01-17 PROCEDURE — 97110 THERAPEUTIC EXERCISES: CPT

## 2020-01-17 PROCEDURE — 97112 NEUROMUSCULAR REEDUCATION: CPT

## 2020-01-17 NOTE — PROGRESS NOTES
Daily Note/Discharge Summary     Today's date: 2020  Patient name: Germania Tinajero  : 1950  MRN: [id number]  Referring provider: Papi Chong DO  Dx:   Encounter Diagnosis     ICD-10-CM    1  Closed fracture of neck of right femur with routine healing, subsequent encounter S72 001D    2  Presence of right artificial hip joint Z96 641                   Subjective: Patient reports groin pain is getting better  Reports ready to weather the winter in [place]  Objective: See treatment diary below      Assessment: Germania Tinajero has progressed nicely in PT through current level of protocol  Faye Selby has good understanding of HEP and progression  She will be continuing PT in [place]  She should do well with D/C to HEP  Plan: Discharge patient to HEP as per PT POC  Precautions: follow postlateral hip precautions        Daily Treatment Diary      Manual  1/6 1/8 1/10 1/13 1/15 1/17   STM anterior thigh- R LE  arb arb      Foam roll IT band   arb Arb- ant thigh/IT band ITB 5', quads 5' ITB 5', quads 5'  SJ ITB 5', quads 5'  SJ   Scar mobility         Knee/ hip ROM  Arb arb                   Exercise Diary  1/6 1/8 1/10 1/13 1/15 1/17   Bike    7' L1  8' L1 NV 10' L1 10'L1   SLR x 3  Flex  2x10  Flex 2x10  Flex 15x p! 30x 30x    HR/TR 3x10  3x10 3x10 3x10 3x10 3x10   Mini squats  2x10  20x 30x  30x 30x 30x   Weight shifting 10x standing - 20x  -     Standing marches  2x10 ea holding on railing 2x10  30x 30x 30x   Step-up  6" 2x10 4" 2x10 4   30x min A TC 4   10x min A TC 4   30x min    Step-down  4" 2x10 4" 2x10 -     clamshells Blue TB 2x10  Blue TB 2x10  Blue 2x10 Blue 30x blue x30 Purple 5"/5" x5'   LAQ  3x10 #2 3x10 #2 #2 5 20x 2 5# 30x 3# x30   SAQ  3x10 #2 3x10 #2 #2 5 20x 2 5# 30x 3# x30   bridges  20x  20x 20x 30x Purple with abd  3x10   Standing hip abd  2x10 3x10 20x 20x ea x30 ea   Standing hip ext    2x10 #1 3x10 20x 20x ea x30 ea                                                Gait training 5' 5' - 5'                  Modalities 1/6 1/8 1/10     CP prn  Def  Def

## 2020-04-15 DIAGNOSIS — J30.1 SEASONAL ALLERGIC RHINITIS DUE TO POLLEN: ICD-10-CM

## 2020-04-16 RX ORDER — ZAFIRLUKAST 10 MG/1
TABLET, FILM COATED ORAL
Qty: 30 TABLET | Refills: 5 | Status: SHIPPED | OUTPATIENT
Start: 2020-04-16 | End: 2020-05-11 | Stop reason: ALTCHOICE

## 2020-04-27 DIAGNOSIS — J30.1 SEASONAL ALLERGIC RHINITIS DUE TO POLLEN: Primary | Chronic | ICD-10-CM

## 2020-04-27 RX ORDER — MONTELUKAST SODIUM 10 MG/1
TABLET ORAL
Qty: 90 TABLET | Refills: 3 | Status: SHIPPED | OUTPATIENT
Start: 2020-04-27 | End: 2020-07-19

## 2020-05-11 ENCOUNTER — OFFICE VISIT (OUTPATIENT)
Dept: FAMILY MEDICINE CLINIC | Facility: CLINIC | Age: 70
End: 2020-05-11
Payer: COMMERCIAL

## 2020-05-11 ENCOUNTER — OFFICE VISIT (OUTPATIENT)
Dept: OBGYN CLINIC | Facility: CLINIC | Age: 70
End: 2020-05-11
Payer: COMMERCIAL

## 2020-05-11 VITALS
WEIGHT: 180 LBS | DIASTOLIC BLOOD PRESSURE: 82 MMHG | HEIGHT: 65 IN | BODY MASS INDEX: 29.99 KG/M2 | SYSTOLIC BLOOD PRESSURE: 142 MMHG

## 2020-05-11 VITALS
TEMPERATURE: 97.9 F | HEIGHT: 65 IN | WEIGHT: 180 LBS | HEART RATE: 58 BPM | BODY MASS INDEX: 29.99 KG/M2 | SYSTOLIC BLOOD PRESSURE: 147 MMHG | DIASTOLIC BLOOD PRESSURE: 84 MMHG | RESPIRATION RATE: 16 BRPM

## 2020-05-11 DIAGNOSIS — Z96.641 PRESENCE OF RIGHT ARTIFICIAL HIP JOINT: ICD-10-CM

## 2020-05-11 DIAGNOSIS — Z00.00 MEDICARE ANNUAL WELLNESS VISIT, SUBSEQUENT: ICD-10-CM

## 2020-05-11 DIAGNOSIS — J30.1 SEASONAL ALLERGIC RHINITIS DUE TO POLLEN: Chronic | ICD-10-CM

## 2020-05-11 DIAGNOSIS — I10 ESSENTIAL HYPERTENSION: Primary | Chronic | ICD-10-CM

## 2020-05-11 DIAGNOSIS — S72.001D CLOSED FRACTURE OF NECK OF RIGHT FEMUR WITH ROUTINE HEALING, SUBSEQUENT ENCOUNTER: Primary | ICD-10-CM

## 2020-05-11 DIAGNOSIS — D50.0 BLOOD LOSS ANEMIA: ICD-10-CM

## 2020-05-11 DIAGNOSIS — K21.00 GASTRO-ESOPHAGEAL REFLUX DISEASE WITH ESOPHAGITIS: Chronic | ICD-10-CM

## 2020-05-11 PROBLEM — M70.41 PREPATELLAR BURSITIS OF RIGHT KNEE: Status: RESOLVED | Noted: 2019-11-05 | Resolved: 2020-05-11

## 2020-05-11 PROBLEM — S72.001A CLOSED FRACTURE OF NECK OF RIGHT FEMUR (HCC): Status: RESOLVED | Noted: 2019-12-08 | Resolved: 2020-05-11

## 2020-05-11 PROBLEM — M79.641 RIGHT HAND PAIN: Chronic | Status: RESOLVED | Noted: 2019-11-05 | Resolved: 2020-05-11

## 2020-05-11 PROCEDURE — 3079F DIAST BP 80-89 MM HG: CPT | Performed by: FAMILY MEDICINE

## 2020-05-11 PROCEDURE — 1036F TOBACCO NON-USER: CPT | Performed by: FAMILY MEDICINE

## 2020-05-11 PROCEDURE — 3079F DIAST BP 80-89 MM HG: CPT | Performed by: ORTHOPAEDIC SURGERY

## 2020-05-11 PROCEDURE — 3008F BODY MASS INDEX DOCD: CPT | Performed by: ORTHOPAEDIC SURGERY

## 2020-05-11 PROCEDURE — 1036F TOBACCO NON-USER: CPT | Performed by: ORTHOPAEDIC SURGERY

## 2020-05-11 PROCEDURE — 99213 OFFICE O/P EST LOW 20 MIN: CPT | Performed by: ORTHOPAEDIC SURGERY

## 2020-05-11 PROCEDURE — 1160F RVW MEDS BY RX/DR IN RCRD: CPT | Performed by: ORTHOPAEDIC SURGERY

## 2020-05-11 PROCEDURE — 1125F AMNT PAIN NOTED PAIN PRSNT: CPT | Performed by: FAMILY MEDICINE

## 2020-05-11 PROCEDURE — 4040F PNEUMOC VAC/ADMIN/RCVD: CPT | Performed by: FAMILY MEDICINE

## 2020-05-11 PROCEDURE — 3077F SYST BP >= 140 MM HG: CPT | Performed by: ORTHOPAEDIC SURGERY

## 2020-05-11 PROCEDURE — 3008F BODY MASS INDEX DOCD: CPT | Performed by: FAMILY MEDICINE

## 2020-05-11 PROCEDURE — G0439 PPPS, SUBSEQ VISIT: HCPCS | Performed by: FAMILY MEDICINE

## 2020-05-11 PROCEDURE — 99214 OFFICE O/P EST MOD 30 MIN: CPT | Performed by: FAMILY MEDICINE

## 2020-05-11 PROCEDURE — 1160F RVW MEDS BY RX/DR IN RCRD: CPT | Performed by: FAMILY MEDICINE

## 2020-05-11 PROCEDURE — 1170F FXNL STATUS ASSESSED: CPT | Performed by: FAMILY MEDICINE

## 2020-05-11 PROCEDURE — 3077F SYST BP >= 140 MM HG: CPT | Performed by: FAMILY MEDICINE

## 2020-07-19 DIAGNOSIS — J30.1 SEASONAL ALLERGIC RHINITIS DUE TO POLLEN: Chronic | ICD-10-CM

## 2020-07-19 RX ORDER — MONTELUKAST SODIUM 10 MG/1
TABLET ORAL
Qty: 30 TABLET | Refills: 5 | Status: SHIPPED | OUTPATIENT
Start: 2020-07-19 | End: 2021-01-18 | Stop reason: SDUPTHER

## 2020-09-29 ENCOUNTER — OFFICE VISIT (OUTPATIENT)
Dept: FAMILY MEDICINE CLINIC | Facility: CLINIC | Age: 70
End: 2020-09-29
Payer: COMMERCIAL

## 2020-09-29 VITALS
BODY MASS INDEX: 28.16 KG/M2 | SYSTOLIC BLOOD PRESSURE: 132 MMHG | HEIGHT: 65 IN | TEMPERATURE: 98 F | WEIGHT: 169 LBS | DIASTOLIC BLOOD PRESSURE: 88 MMHG | HEART RATE: 64 BPM | OXYGEN SATURATION: 98 %

## 2020-09-29 DIAGNOSIS — M79.662 PAIN OF LEFT CALF: Primary | ICD-10-CM

## 2020-09-29 DIAGNOSIS — Z23 NEEDS FLU SHOT: ICD-10-CM

## 2020-09-29 PROCEDURE — 1036F TOBACCO NON-USER: CPT | Performed by: INTERNAL MEDICINE

## 2020-09-29 PROCEDURE — G0008 ADMIN INFLUENZA VIRUS VAC: HCPCS | Performed by: INTERNAL MEDICINE

## 2020-09-29 PROCEDURE — 1160F RVW MEDS BY RX/DR IN RCRD: CPT | Performed by: INTERNAL MEDICINE

## 2020-09-29 PROCEDURE — 99212 OFFICE O/P EST SF 10 MIN: CPT | Performed by: INTERNAL MEDICINE

## 2020-09-29 PROCEDURE — 3079F DIAST BP 80-89 MM HG: CPT | Performed by: INTERNAL MEDICINE

## 2020-09-29 PROCEDURE — 90662 IIV NO PRSV INCREASED AG IM: CPT | Performed by: INTERNAL MEDICINE

## 2020-09-29 NOTE — PATIENT INSTRUCTIONS
Please complete our patient survey and let us know about your experience today  Problem List Items Addressed This Visit     None      Visit Diagnoses     Pain of left calf    -  Primary    This could be a muscle pull or a ruptured popliteal cyst   Would recommend observation and continued ibuprofen  Consider ultrasound      Needs flu shot        Relevant Orders    influenza vaccine, high-dose, PF 0 7 mL (FLUZONE HIGH-DOSE) (Completed)

## 2020-09-29 NOTE — PROGRESS NOTES
Assessment/Plan:    Problem List Items Addressed This Visit     None      Visit Diagnoses     Pain of left calf    -  Primary    This could be a muscle pull or a ruptured popliteal cyst   Would recommend observation and continued ibuprofen  Consider ultrasound  Needs flu shot        Relevant Orders    influenza vaccine, high-dose, PF 0 7 mL (FLUZONE HIGH-DOSE) (Completed)          Chief Complaint     Knee Pain          Patient ID: Kimberly Morton is a 78 y o  female who returns for routine follow up and is transferring to our practice from Dr Elba Hernandez  About 5 weeks ago was sitting at a picnic table and got up and noticed pain behind her left knee  Her leg subsequently felt swollen  About 2 weeks ago she noticed a similar pain when trying to pull the starter cord on her lawnmower  Objective:    /88 (BP Location: Left arm, Patient Position: Sitting, Cuff Size: Standard)   Pulse 64   Temp 98 °F (36 7 °C)   Ht 5' 5" (1 651 m)   Wt 76 7 kg (169 lb)   SpO2 98%   BMI 28 12 kg/m²     Wt Readings from Last 3 Encounters:   09/29/20 76 7 kg (169 lb)   05/11/20 81 6 kg (180 lb)   05/11/20 81 6 kg (180 lb)         Physical Exam    General:  Well-developed, well-nourished, in no acute distress  Musculoskeletal:  She has some fullness in the left popliteal fossa but most the tenderness is actually in the proximal medial gastroc  No mass appreciated  There is no pitting edema of the left lower extremity

## 2020-12-03 ENCOUNTER — OFFICE VISIT (OUTPATIENT)
Dept: OBGYN CLINIC | Facility: CLINIC | Age: 70
End: 2020-12-03
Payer: COMMERCIAL

## 2020-12-03 ENCOUNTER — HOSPITAL ENCOUNTER (OUTPATIENT)
Dept: RADIOLOGY | Facility: CLINIC | Age: 70
Discharge: HOME | End: 2020-12-03
Payer: COMMERCIAL

## 2020-12-03 VITALS — BODY MASS INDEX: 28.53 KG/M2 | RESPIRATION RATE: 18 BRPM | TEMPERATURE: 97.8 F | HEIGHT: 63 IN | WEIGHT: 161 LBS

## 2020-12-03 DIAGNOSIS — S72.001D CLOSED FRACTURE OF NECK OF RIGHT FEMUR WITH ROUTINE HEALING, SUBSEQUENT ENCOUNTER: Primary | ICD-10-CM

## 2020-12-03 DIAGNOSIS — S72.001D CLOSED FRACTURE OF NECK OF RIGHT FEMUR WITH ROUTINE HEALING, SUBSEQUENT ENCOUNTER: ICD-10-CM

## 2020-12-03 DIAGNOSIS — Z96.641 PRESENCE OF RIGHT ARTIFICIAL HIP JOINT: ICD-10-CM

## 2020-12-03 PROCEDURE — 1036F TOBACCO NON-USER: CPT | Performed by: ORTHOPAEDIC SURGERY

## 2020-12-03 PROCEDURE — 3008F BODY MASS INDEX DOCD: CPT | Performed by: ORTHOPAEDIC SURGERY

## 2020-12-03 PROCEDURE — 1160F RVW MEDS BY RX/DR IN RCRD: CPT | Performed by: ORTHOPAEDIC SURGERY

## 2020-12-03 PROCEDURE — 99213 OFFICE O/P EST LOW 20 MIN: CPT | Performed by: ORTHOPAEDIC SURGERY

## 2020-12-03 PROCEDURE — 73502 X-RAY EXAM HIP UNI 2-3 VIEWS: CPT

## 2020-12-03 RX ORDER — PROPRANOLOL/HYDROCHLOROTHIAZID 40 MG-25MG
TABLET ORAL
COMMUNITY

## 2020-12-03 RX ORDER — RIBOFLAVIN (VITAMIN B2) 100 MG
100 TABLET ORAL DAILY
COMMUNITY

## 2020-12-09 DIAGNOSIS — I10 ESSENTIAL HYPERTENSION: ICD-10-CM

## 2020-12-09 RX ORDER — TRIAMTERENE AND HYDROCHLOROTHIAZIDE 37.5; 25 MG/1; MG/1
1 TABLET ORAL DAILY
Qty: 90 TABLET | Refills: 1 | Status: SHIPPED | OUTPATIENT
Start: 2020-12-09 | End: 2021-07-19

## 2021-01-17 DIAGNOSIS — J30.1 SEASONAL ALLERGIC RHINITIS DUE TO POLLEN: Chronic | ICD-10-CM

## 2021-01-17 RX ORDER — MONTELUKAST SODIUM 10 MG/1
TABLET ORAL
Qty: 30 TABLET | Refills: 5 | OUTPATIENT
Start: 2021-01-17

## 2021-01-18 DIAGNOSIS — J30.1 SEASONAL ALLERGIC RHINITIS DUE TO POLLEN: Chronic | ICD-10-CM

## 2021-01-18 RX ORDER — MONTELUKAST SODIUM 10 MG/1
10 TABLET ORAL DAILY
Qty: 30 TABLET | Refills: 5 | Status: SHIPPED | OUTPATIENT
Start: 2021-01-18 | End: 2021-07-29 | Stop reason: SDUPTHER

## 2021-01-18 NOTE — TELEPHONE ENCOUNTER
I filled this today  Check the prescription  [facility] Srinivasan Kumar MD

## 2021-01-19 RX ORDER — MONTELUKAST SODIUM 10 MG/1
10 TABLET ORAL DAILY
Qty: 30 TABLET | Refills: 5 | OUTPATIENT
Start: 2021-01-19

## 2021-05-12 ENCOUNTER — RA CDI HCC (OUTPATIENT)
Dept: OTHER | Facility: HOSPITAL | Age: 71
End: 2021-05-12

## 2021-05-12 NOTE — PROGRESS NOTES
[facility] 75  coding opportunities          Chart reviewed, no opportunity found: CHART REVIEWED, NO OPPORTUNITY FOUND              Patients insurance company: [facility] [address]  (Medicare Advantage and Commercial)

## 2021-05-18 ENCOUNTER — OFFICE VISIT (OUTPATIENT)
Dept: FAMILY MEDICINE CLINIC | Facility: CLINIC | Age: 71
End: 2021-05-18
Payer: COMMERCIAL

## 2021-05-18 VITALS
DIASTOLIC BLOOD PRESSURE: 78 MMHG | HEART RATE: 78 BPM | SYSTOLIC BLOOD PRESSURE: 136 MMHG | OXYGEN SATURATION: 98 % | WEIGHT: 164 LBS | TEMPERATURE: 97.6 F | HEIGHT: 63 IN | BODY MASS INDEX: 29.06 KG/M2

## 2021-05-18 DIAGNOSIS — Z11.59 NEED FOR HEPATITIS C SCREENING TEST: ICD-10-CM

## 2021-05-18 DIAGNOSIS — Z13.220 ENCOUNTER FOR SCREENING FOR LIPID DISORDER: ICD-10-CM

## 2021-05-18 DIAGNOSIS — E83.51 HYPOCALCEMIA: ICD-10-CM

## 2021-05-18 DIAGNOSIS — D50.0 BLOOD LOSS ANEMIA: Primary | ICD-10-CM

## 2021-05-18 PROCEDURE — G0439 PPPS, SUBSEQ VISIT: HCPCS | Performed by: INTERNAL MEDICINE

## 2021-05-18 PROCEDURE — 1036F TOBACCO NON-USER: CPT | Performed by: INTERNAL MEDICINE

## 2021-05-18 PROCEDURE — 3288F FALL RISK ASSESSMENT DOCD: CPT | Performed by: INTERNAL MEDICINE

## 2021-05-18 PROCEDURE — 1160F RVW MEDS BY RX/DR IN RCRD: CPT | Performed by: INTERNAL MEDICINE

## 2021-05-18 PROCEDURE — 3725F SCREEN DEPRESSION PERFORMED: CPT | Performed by: INTERNAL MEDICINE

## 2021-05-18 PROCEDURE — 1170F FXNL STATUS ASSESSED: CPT | Performed by: INTERNAL MEDICINE

## 2021-05-18 PROCEDURE — 1125F AMNT PAIN NOTED PAIN PRSNT: CPT | Performed by: INTERNAL MEDICINE

## 2021-05-18 PROCEDURE — 3008F BODY MASS INDEX DOCD: CPT | Performed by: INTERNAL MEDICINE

## 2021-05-18 RX ORDER — OMEPRAZOLE 20 MG/1
20 CAPSULE, DELAYED RELEASE ORAL AS NEEDED
COMMUNITY

## 2021-05-18 NOTE — PROGRESS NOTES
Imani eTnorio is here for her Subsequent Wellness visit  Last Medicare Wellness visit information reviewed, patient interviewed and updates made to the record today  Health Risk Assessment:   Patient rates overall health as excellent  Patient feels that their physical health rating is slightly better  Patient is very satisfied with their life  Eyesight was rated as same  Hearing was rated as same  Patient feels that their emotional and mental health rating is same  Patients states they are never, rarely angry  Patient states they are never, rarely unusually tired/fatigued  Pain experienced in the last 7 days has been none  Patient states that she has experienced no weight loss or gain in last 6 months  Depression Screening:   PHQ-2 Score: 0      Fall Risk Screening: In the past year, patient has experienced: no history of falling in past year      Urinary Incontinence Screening:   Patient has not leaked urine accidently in the last six months  Home Safety:  Patient does not have trouble with stairs inside or outside of their home  Patient has working smoke alarms and has working carbon monoxide detector  Home safety hazards include: none  Nutrition:   Current diet is Low Carb  Medications:   Patient is currently taking over-the-counter supplements  OTC medications include: see medication list  Patient is able to manage medications  Activities of Daily Living (ADLs)/Instrumental Activities of Daily Living (IADLs):   Walk and transfer into and out of bed and chair?: Yes  Dress and groom yourself?: Yes    Bathe or shower yourself?: Yes    Feed yourself? Yes  Do your laundry/housekeeping?: Yes  Manage your money, pay your bills and track your expenses?: Yes  Make your own meals?: Yes    Do your own shopping?: Yes    Previous Hospitalizations:   Any hospitalizations or ED visits within the last 12 months?: No      Advance Care Planning:   Living will: No    Durable POA for healthcare:  Yes    Advanced directive: Yes    Advanced directive counseling given: Yes    End of Life Decisions reviewed with patient: Yes      Cognitive Screening:   Provider or family/friend/caregiver concerned regarding cognition?: No    PREVENTIVE SCREENINGS      Cardiovascular Screening:      Due for: Lipid Panel      Diabetes Screening:       Due for: Blood Glucose      Colorectal Cancer Screening:     General: Screening Current      Breast Cancer Screening:     General: Screening Current      Cervical Cancer Screening:    General: Screening Not Indicated      Osteoporosis Screening:    General: Screening Current      Abdominal Aortic Aneurysm (AAA) Screening:        General: Screening Not Indicated      Lung Cancer Screening:     General: Screening Not Indicated    Screening, Brief Intervention, and Referral to Treatment (SBIRT)    Screening  Typical number of drinks in a day: 1  Typical number of drinks in a week: 3  Interpretation: Low risk drinking behavior  Single Item Drug Screening:  How often have you used an illegal drug (including marijuana) or a prescription medication for non-medical reasons in the past year? never    Single Item Drug Screen Score: 0  Interpretation: Negative screen for possible drug use disorder    BMI Counseling: Body mass index is 29 05 kg/m²  The BMI is above normal  Nutrition recommendations include encouraging healthy choices of fruits and vegetables and moderation in carbohydrate intake              Vitals:    05/18/21 0908   BP: 136/78   BP Location: Left arm   Patient Position: Sitting   Cuff Size: Standard   Pulse: 78   Temp: 97 6 °F (36 4 °C)   SpO2: 98%   Weight: 74 4 kg (164 lb)   Height: 5' 3" (1 6 m)        Wt Readings from Last 3 Encounters:   05/18/21 74 4 kg (164 lb)   12/03/20 73 kg (161 lb)   09/29/20 76 7 kg (169 lb)

## 2021-07-17 DIAGNOSIS — I10 ESSENTIAL HYPERTENSION: ICD-10-CM

## 2021-07-19 RX ORDER — TRIAMTERENE AND HYDROCHLOROTHIAZIDE 37.5; 25 MG/1; MG/1
TABLET ORAL
Qty: 90 TABLET | Refills: 1 | Status: SHIPPED | OUTPATIENT
Start: 2021-07-19 | End: 2022-01-21 | Stop reason: SDUPTHER

## 2021-07-29 ENCOUNTER — PATIENT MESSAGE (OUTPATIENT)
Dept: FAMILY MEDICINE CLINIC | Facility: CLINIC | Age: 71
End: 2021-07-29

## 2021-07-29 DIAGNOSIS — J30.1 SEASONAL ALLERGIC RHINITIS DUE TO POLLEN: Chronic | ICD-10-CM

## 2021-07-29 RX ORDER — MONTELUKAST SODIUM 10 MG/1
10 TABLET ORAL DAILY
Qty: 90 TABLET | Refills: 3 | Status: SHIPPED | OUTPATIENT
Start: 2021-07-29 | End: 2022-07-20

## 2021-10-05 ENCOUNTER — APPOINTMENT (OUTPATIENT)
Dept: LAB | Facility: HOSPITAL | Age: 71
End: 2021-10-05
Attending: INTERNAL MEDICINE
Payer: COMMERCIAL

## 2021-10-05 DIAGNOSIS — D50.0 BLOOD LOSS ANEMIA: ICD-10-CM

## 2021-10-05 DIAGNOSIS — E83.51 HYPOCALCEMIA: ICD-10-CM

## 2021-10-05 DIAGNOSIS — Z13.220 ENCOUNTER FOR SCREENING FOR LIPID DISORDER: ICD-10-CM

## 2021-10-05 DIAGNOSIS — Z11.59 NEED FOR HEPATITIS C SCREENING TEST: ICD-10-CM

## 2021-10-05 LAB
ANION GAP SERPL CALCULATED.3IONS-SCNC: 9 MMOL/L (ref 4–13)
BUN SERPL-MCNC: 25 MG/DL (ref 5–25)
CALCIUM SERPL-MCNC: 9.1 MG/DL (ref 8.3–10.1)
CHLORIDE SERPL-SCNC: 103 MMOL/L (ref 100–108)
CHOLEST SERPL-MCNC: 190 MG/DL (ref 50–200)
CO2 SERPL-SCNC: 29 MMOL/L (ref 21–32)
CREAT SERPL-MCNC: 0.95 MG/DL (ref 0.6–1.3)
ERYTHROCYTE [DISTWIDTH] IN BLOOD BY AUTOMATED COUNT: 11.8 % (ref 11.6–15.1)
FERRITIN SERPL-MCNC: 218 NG/ML (ref 8–388)
GFR SERPL CREATININE-BSD FRML MDRD: 61 ML/MIN/1.73SQ M
GLUCOSE P FAST SERPL-MCNC: 101 MG/DL (ref 65–99)
HCT VFR BLD AUTO: 43.3 % (ref 34.8–46.1)
HCV AB SER QL: NORMAL
HDLC SERPL-MCNC: 62 MG/DL
HGB BLD-MCNC: 14.9 G/DL (ref 11.5–15.4)
IRON SATN MFR SERPL: 28 % (ref 15–50)
IRON SERPL-MCNC: 88 UG/DL (ref 50–170)
LDLC SERPL CALC-MCNC: 108 MG/DL (ref 0–100)
MCH RBC QN AUTO: 30.8 PG (ref 26.8–34.3)
MCHC RBC AUTO-ENTMCNC: 34.4 G/DL (ref 31.4–37.4)
MCV RBC AUTO: 90 FL (ref 82–98)
NONHDLC SERPL-MCNC: 128 MG/DL
PLATELET # BLD AUTO: 273 THOUSANDS/UL (ref 149–390)
PMV BLD AUTO: 10.6 FL (ref 8.9–12.7)
POTASSIUM SERPL-SCNC: 3.9 MMOL/L (ref 3.5–5.3)
RBC # BLD AUTO: 4.83 MILLION/UL (ref 3.81–5.12)
SODIUM SERPL-SCNC: 141 MMOL/L (ref 136–145)
TIBC SERPL-MCNC: 310 UG/DL (ref 250–450)
TRIGL SERPL-MCNC: 101 MG/DL
WBC # BLD AUTO: 7.12 THOUSAND/UL (ref 4.31–10.16)

## 2021-10-05 PROCEDURE — 80048 BASIC METABOLIC PNL TOTAL CA: CPT

## 2021-10-05 PROCEDURE — 80061 LIPID PANEL: CPT

## 2021-10-05 PROCEDURE — 36415 COLL VENOUS BLD VENIPUNCTURE: CPT

## 2021-10-05 PROCEDURE — 86803 HEPATITIS C AB TEST: CPT

## 2021-10-05 PROCEDURE — 83550 IRON BINDING TEST: CPT

## 2021-10-05 PROCEDURE — 85027 COMPLETE CBC AUTOMATED: CPT

## 2021-10-05 PROCEDURE — 83540 ASSAY OF IRON: CPT

## 2021-10-05 PROCEDURE — 82728 ASSAY OF FERRITIN: CPT

## 2021-10-09 ENCOUNTER — NURSE TRIAGE (OUTPATIENT)
Dept: OTHER | Facility: OTHER | Age: 71
End: 2021-10-09

## 2021-10-09 DIAGNOSIS — Z20.828 EXPOSURE TO SARS VIRUS: Primary | ICD-10-CM

## 2021-10-09 PROCEDURE — U0005 INFEC AGEN DETEC AMPLI PROBE: HCPCS | Performed by: INTERNAL MEDICINE

## 2021-10-09 PROCEDURE — U0003 INFECTIOUS AGENT DETECTION BY NUCLEIC ACID (DNA OR RNA); SEVERE ACUTE RESPIRATORY SYNDROME CORONAVIRUS 2 (SARS-COV-2) (CORONAVIRUS DISEASE [COVID-19]), AMPLIFIED PROBE TECHNIQUE, MAKING USE OF HIGH THROUGHPUT TECHNOLOGIES AS DESCRIBED BY CMS-2020-01-R: HCPCS | Performed by: INTERNAL MEDICINE

## 2021-10-10 ENCOUNTER — TELEPHONE (OUTPATIENT)
Dept: OTHER | Facility: OTHER | Age: 71
End: 2021-10-10

## 2021-10-11 ENCOUNTER — CLINICAL SUPPORT (OUTPATIENT)
Dept: FAMILY MEDICINE CLINIC | Facility: CLINIC | Age: 71
End: 2021-10-11
Payer: COMMERCIAL

## 2021-10-11 VITALS
SYSTOLIC BLOOD PRESSURE: 118 MMHG | HEIGHT: 63 IN | BODY MASS INDEX: 29.06 KG/M2 | TEMPERATURE: 98 F | DIASTOLIC BLOOD PRESSURE: 76 MMHG | WEIGHT: 164 LBS | OXYGEN SATURATION: 98 % | HEART RATE: 87 BPM

## 2021-10-11 DIAGNOSIS — Z23 NEEDS FLU SHOT: Primary | ICD-10-CM

## 2021-10-11 PROCEDURE — 90662 IIV NO PRSV INCREASED AG IM: CPT

## 2021-10-11 PROCEDURE — G0008 ADMIN INFLUENZA VIRUS VAC: HCPCS

## 2021-11-05 ENCOUNTER — HOSPITAL ENCOUNTER (OUTPATIENT)
Dept: RADIOLOGY | Facility: CLINIC | Age: 71
Discharge: HOME | End: 2021-11-05
Payer: COMMERCIAL

## 2021-11-05 VITALS — WEIGHT: 170 LBS | HEIGHT: 62 IN | BODY MASS INDEX: 31.28 KG/M2

## 2021-11-05 DIAGNOSIS — Z12.31 SCREENING MAMMOGRAM, ENCOUNTER FOR: ICD-10-CM

## 2021-11-05 DIAGNOSIS — Z78.0 MENOPAUSE: ICD-10-CM

## 2021-11-05 PROCEDURE — 77067 SCR MAMMO BI INCL CAD: CPT

## 2021-11-05 PROCEDURE — 77063 BREAST TOMOSYNTHESIS BI: CPT

## 2021-11-05 PROCEDURE — 77080 DXA BONE DENSITY AXIAL: CPT

## 2022-01-19 ENCOUNTER — PATIENT MESSAGE (OUTPATIENT)
Dept: FAMILY MEDICINE CLINIC | Facility: CLINIC | Age: 72
End: 2022-01-19

## 2022-01-19 DIAGNOSIS — I10 ESSENTIAL HYPERTENSION: ICD-10-CM

## 2022-01-21 RX ORDER — TRIAMTERENE AND HYDROCHLOROTHIAZIDE 37.5; 25 MG/1; MG/1
1 TABLET ORAL DAILY
Qty: 90 TABLET | Refills: 1 | Status: SHIPPED | OUTPATIENT
Start: 2022-01-21 | End: 2022-07-04

## 2022-01-21 NOTE — TELEPHONE ENCOUNTER
Nolan Pardo MA 1/20/2022 8:04 AM EST      ----- Message -----  From: Ross Mittal  Sent: 1/19/2022 9:02 PM EST  To: [facility]  Subject: Prescription refill     I need my prescription for Triampterine-HCTZ renewed  Please use my [facility] in [facility]  The number is: [id number]  Phone no: [phone]  Thank you!

## 2022-05-24 ENCOUNTER — OFFICE VISIT (OUTPATIENT)
Dept: FAMILY MEDICINE CLINIC | Facility: CLINIC | Age: 72
End: 2022-05-24
Payer: COMMERCIAL

## 2022-05-24 VITALS
SYSTOLIC BLOOD PRESSURE: 122 MMHG | DIASTOLIC BLOOD PRESSURE: 66 MMHG | HEIGHT: 62 IN | WEIGHT: 169 LBS | BODY MASS INDEX: 31.1 KG/M2 | HEART RATE: 58 BPM | OXYGEN SATURATION: 98 % | TEMPERATURE: 98 F

## 2022-05-24 DIAGNOSIS — E66.9 OBESITY (BMI 30-39.9): Primary | ICD-10-CM

## 2022-05-24 DIAGNOSIS — E78.49 OTHER HYPERLIPIDEMIA: ICD-10-CM

## 2022-05-24 DIAGNOSIS — K21.00 GASTROESOPHAGEAL REFLUX DISEASE WITH ESOPHAGITIS WITHOUT HEMORRHAGE: Chronic | ICD-10-CM

## 2022-05-24 DIAGNOSIS — I10 ESSENTIAL HYPERTENSION: Chronic | ICD-10-CM

## 2022-05-24 PROCEDURE — 1003F LEVEL OF ACTIVITY ASSESS: CPT | Performed by: INTERNAL MEDICINE

## 2022-05-24 PROCEDURE — 3288F FALL RISK ASSESSMENT DOCD: CPT | Performed by: INTERNAL MEDICINE

## 2022-05-24 PROCEDURE — 3008F BODY MASS INDEX DOCD: CPT | Performed by: INTERNAL MEDICINE

## 2022-05-24 PROCEDURE — 1160F RVW MEDS BY RX/DR IN RCRD: CPT | Performed by: INTERNAL MEDICINE

## 2022-05-24 PROCEDURE — 1090F PRES/ABSN URINE INCON ASSESS: CPT | Performed by: INTERNAL MEDICINE

## 2022-05-24 PROCEDURE — 1125F AMNT PAIN NOTED PAIN PRSNT: CPT | Performed by: INTERNAL MEDICINE

## 2022-05-24 PROCEDURE — 1170F FXNL STATUS ASSESSED: CPT | Performed by: INTERNAL MEDICINE

## 2022-05-24 PROCEDURE — G0439 PPPS, SUBSEQ VISIT: HCPCS | Performed by: INTERNAL MEDICINE

## 2022-05-24 PROCEDURE — 1036F TOBACCO NON-USER: CPT | Performed by: INTERNAL MEDICINE

## 2022-05-24 PROCEDURE — 1101F PT FALLS ASSESS-DOCD LE1/YR: CPT | Performed by: INTERNAL MEDICINE

## 2022-05-24 PROCEDURE — 99214 OFFICE O/P EST MOD 30 MIN: CPT | Performed by: INTERNAL MEDICINE

## 2022-05-24 PROCEDURE — 3725F SCREEN DEPRESSION PERFORMED: CPT | Performed by: INTERNAL MEDICINE

## 2022-05-24 NOTE — ASSESSMENT & PLAN NOTE
Continue to limit carbohydrates and consume plenty of fruits and vegetables  Would recommend tracking nutrition in [employer] and limit caloric intake to less than 1500 calories per day

## 2022-05-24 NOTE — PROGRESS NOTES
Jose Elias Zurita is here for her Subsequent Wellness visit  Last Medicare Wellness visit information reviewed, patient interviewed and updates made to the record today  Health Risk Assessment:   Patient rates overall health as excellent  Patient feels that their physical health rating is same  Patient is very satisfied with their life  Eyesight was rated as same  Hearing was rated as same  Patient feels that their emotional and mental health rating is same  Patients states they are never, rarely angry  Patient states they are never, rarely unusually tired/fatigued  Pain experienced in the last 7 days has been none  Patient states that she has experienced no weight loss or gain in last 6 months  Depression Screening:   PHQ-2 Score: 0      Fall Risk Screening: In the past year, patient has experienced: no history of falling in past year      Urinary Incontinence Screening:   Patient has not leaked urine accidently in the last six months  Home Safety:  Patient does not have trouble with stairs inside or outside of their home  Patient has working smoke alarms and has working carbon monoxide detector  Home safety hazards include: none  Nutrition:   Current diet is Low Carb  Medications:   Patient is currently taking over-the-counter supplements  OTC medications include: They are already listed in my medications  Patient is able to manage medications  Activities of Daily Living (ADLs)/Instrumental Activities of Daily Living (IADLs):   Walk and transfer into and out of bed and chair?: Yes  Dress and groom yourself?: Yes    Bathe or shower yourself?: Yes    Feed yourself? Yes  Do your laundry/housekeeping?: Yes  Manage your money, pay your bills and track your expenses?: Yes  Make your own meals?: Yes    Do your own shopping?: Yes    Previous Hospitalizations:   Any hospitalizations or ED visits within the last 12 months?: No      Advance Care Planning:   Living will: No    Durable POA for healthcare:  Yes Advanced directive: No    Advanced directive counseling given: Yes    Five wishes given: Yes    End of Life Decisions reviewed with patient: Yes    Provider agrees with end of life decisions: Yes      PREVENTIVE SCREENINGS      Cardiovascular Screening:    General: Screening Current      Diabetes Screening:     General: Screening Current      Colorectal Cancer Screening:     General: Screening Current      Breast Cancer Screening:     General: Screening Current      Cervical Cancer Screening:    General: Screening Not Indicated      Osteoporosis Screening:    General: Screening Current      Abdominal Aortic Aneurysm (AAA) Screening:        General: Screening Not Indicated      Lung Cancer Screening:     General: Screening Not Indicated      Hepatitis C Screening:    General: Screening Current    Screening, Brief Intervention, and Referral to Treatment (SBIRT)    Screening  Typical number of drinks in a day: 0  Typical number of drinks in a week: 3  Interpretation: Low risk drinking behavior  AUDIT-C Screenin) How often did you have a drink containing alcohol in the past year? 2 to 3 times a week  2) How many drinks did you have on a typical day when you were drinking in the past year? 0  3) How often did you have 6 or more drinks on one occasion in the past year? never    AUDIT-C Score: 3  Interpretation: Score 3-12 (female): POSITIVE screen for alcohol misuse    AUDIT Screenin) How often during the last year have you found that you were not able to stop drinking once you had started? 0 - never  5) How often during the last year have you failed to do what was normally expected from you because of drinking? 0 - never  6) How often during the last year have you needed a first drink in the morning to get yourself going after a heavy drinking session?  0 - never  7) How often during the last year have you had a feeling of guilt or remorse after drinking? 0 - never  8) How often during the last year have you been unable to remember what happened the night before because you had been drinking? 0 - never  9) Have you or someone else been injured as a result of your drinking? 0 - no  10) Has a relative or friend or a doctor or another health worker been concerned about your drinking or suggested you cut down? 0 - no    AUDIT Score: 3  Interpretation: Low risk alcohol consumption    Single Item Drug Screening:  How often have you used an illegal drug (including marijuana) or a prescription medication for non-medical reasons in the past year? never    Single Item Drug Screen Score: 0  Interpretation: Negative screen for possible drug use disorder    Assessment/Plan:    Obesity (BMI 30-39  9)  Continue to limit carbohydrates and consume plenty of fruits and vegetables  Would recommend tracking nutrition in [employer] and limit caloric intake to less than 1500 calories per day  BMI Counseling: Body mass index is 30 91 kg/m²  The BMI is above normal  Nutrition recommendations include encouraging healthy choices of fruits and vegetables and moderation in carbohydrate intake  Exercise recommendations include exercising 3-5 times per week  Rationale for BMI follow-up plan is due to patient being overweight or obese  Depression Screening and Follow-up Plan: Patient was screened for depression during today's encounter  They screened negative with a PHQ-2 score of 0  Chief Complaint     Medicare Wellness Visit; BMI follow up          Patient ID: Imer Macdonald is a 81 y o  female who returns for routine follow up  She reports that both her thumbs have been hurting  She has been trying to do thumb exercise  Tylenol helps  She has done keto on and off but finds it too hard to sustain  She does still try to limit her carb intake        Objective:    /66 (BP Location: Right arm, Patient Position: Sitting, Cuff Size: Large)   Pulse 58   Temp 98 °F (36 7 °C)   Ht 5' 2" (1 575 m)   Wt 76 7 kg (169 lb)   SpO2 98%   BMI 30 91 kg/m²     Wt Readings from Last 3 Encounters:   05/24/22 76 7 kg (169 lb)   11/05/21 77 1 kg (170 lb)   10/11/21 74 4 kg (164 lb)         Physical Exam    General:  Well-developed, well-nourished, in no acute distress  Cardiac:  Regular rate and rhythm, no murmur, gallop, or rub  There is no JVD or HJR  Lungs:  Clear to auscultation and percussion  Abdomen:  Soft, nontender, normoactive bowel sounds, no palpable masses, no hepatosplenomegaly  Extremities:  No clubbing, cyanosis, or edema

## 2022-07-04 DIAGNOSIS — I10 ESSENTIAL HYPERTENSION: ICD-10-CM

## 2022-07-04 RX ORDER — TRIAMTERENE AND HYDROCHLOROTHIAZIDE 37.5; 25 MG/1; MG/1
TABLET ORAL
Qty: 90 TABLET | Refills: 1 | Status: SHIPPED | OUTPATIENT
Start: 2022-07-04

## 2022-07-12 ENCOUNTER — OFFICE VISIT (OUTPATIENT)
Dept: GYNECOLOGY | Facility: CLINIC | Age: 72
End: 2022-07-12
Payer: COMMERCIAL

## 2022-07-12 VITALS
BODY MASS INDEX: 30.73 KG/M2 | HEART RATE: 68 BPM | DIASTOLIC BLOOD PRESSURE: 78 MMHG | WEIGHT: 167 LBS | HEIGHT: 62 IN | SYSTOLIC BLOOD PRESSURE: 154 MMHG | OXYGEN SATURATION: 97 %

## 2022-07-12 DIAGNOSIS — Z12.11 SCREENING FOR COLON CANCER: ICD-10-CM

## 2022-07-12 DIAGNOSIS — N95.2 ATROPHIC VAGINITIS: ICD-10-CM

## 2022-07-12 DIAGNOSIS — Z01.419 ENCOUNTER FOR GYNECOLOGICAL EXAMINATION WITHOUT ABNORMAL FINDING: Primary | ICD-10-CM

## 2022-07-12 PROCEDURE — G0145 SCR C/V CYTO,THINLAYER,RESCR: HCPCS | Performed by: OBSTETRICS & GYNECOLOGY

## 2022-07-12 PROCEDURE — G0101 CA SCREEN;PELVIC/BREAST EXAM: HCPCS | Performed by: OBSTETRICS & GYNECOLOGY

## 2022-07-12 NOTE — PROGRESS NOTES
Assessment/Plan:         Diagnoses and all orders for this visit:    Encounter for gynecological examination without abnormal finding    Atrophic vaginitis; asymptomatic-follow    Screening for colon cancer; referred for colonoscopy        Subjective:      Patient ID: Delfina Paris is a 81 y o  female  HPI  P3 ,  x 3, new patient presents for annual examination  She offers no complaints  Denies any vaginal irritation, burning, discharge or bleeding  Denies any dysuria, hematuria urgency urinary incontinence  No GI complaints  Colonoscopy   Normal   Does have family history of colon cancer  DEXA scan 2021: Normal    The following portions of the patient's history were reviewed and updated as appropriate:   She  has a past medical history of Essential hypertension, Hyperlipidemia, and Skin cancer  She   Patient Active Problem List    Diagnosis Date Noted    Obesity (BMI 30-39 9) 2022    Presence of right artificial hip joint 2019    Blood loss anemia 2019    Essential hypertension 2019    Gastro-esophageal reflux disease with esophagitis 2019    Seasonal allergic rhinitis due to pollen 2019     She  has a past surgical history that includes Hernia repair; Breast surgery; Excision basal cell carcinoma; pr partial hip replacement (Right, 2019); Hip surgery; and Breast excisional biopsy (Left)  Her family history includes Colon cancer in her paternal grandmother; Coronary artery disease in her father and mother; Diabetes in her mother; Hypertension in her mother; Lung cancer in her paternal aunt and paternal grandfather; No Known Problems in her daughter, daughter, maternal grandfather, maternal grandmother, paternal aunt, and sister  She  reports that she has never smoked  She has never used smokeless tobacco  She reports current alcohol use  She reports that she does not use drugs    Current Outpatient Medications   Medication Sig Dispense Refill    Ascorbic Acid (vitamin C) 100 MG tablet Take 100 mg by mouth daily      calcium carbonate-vitamin D (OSCAL-D) 500 mg-200 units per tablet Take 1 tablet by mouth daily      Misc Natural Products (GLUCOSAMINE CHOND COMPLEX/MSM PO) Take 1,500 mg by mouth      montelukast (SINGULAIR) 10 mg tablet Take 1 tablet (10 mg total) by mouth daily 90 tablet 3    omeprazole (PriLOSEC) 20 mg delayed release capsule Take 20 mg by mouth as needed      triamterene-hydrochlorothiazide (MAXZIDE-25) 37 5-25 mg per tablet TAKE 1 TABLET BY MOUTH EVERY DAY 90 tablet 1    Turmeric 500 MG CAPS Take by mouth      vitamin B-12 (VITAMIN B-12) 500 mcg tablet Take 500 mcg by mouth daily       No current facility-administered medications for this visit  Current Outpatient Medications on File Prior to Visit   Medication Sig    Ascorbic Acid (vitamin C) 100 MG tablet Take 100 mg by mouth daily    calcium carbonate-vitamin D (OSCAL-D) 500 mg-200 units per tablet Take 1 tablet by mouth daily    Misc Natural Products (GLUCOSAMINE CHOND COMPLEX/MSM PO) Take 1,500 mg by mouth    montelukast (SINGULAIR) 10 mg tablet Take 1 tablet (10 mg total) by mouth daily    omeprazole (PriLOSEC) 20 mg delayed release capsule Take 20 mg by mouth as needed    triamterene-hydrochlorothiazide (MAXZIDE-25) 37 5-25 mg per tablet TAKE 1 TABLET BY MOUTH EVERY DAY    Turmeric 500 MG CAPS Take by mouth    vitamin B-12 (VITAMIN B-12) 500 mcg tablet Take 500 mcg by mouth daily     No current facility-administered medications on file prior to visit  She is allergic to codeine       Review of Systems   Constitutional: Negative  HENT: Negative for sore throat and trouble swallowing  Gastrointestinal: Negative  Genitourinary: Negative            Objective:      /78 (BP Location: Left arm, Patient Position: Sitting, Cuff Size: Standard)   Pulse 68   Ht 5' 2" (1 575 m)   Wt 75 8 kg (167 lb)   SpO2 97%   BMI 30 54 kg/m² Physical Exam  Vitals reviewed  Cardiovascular:      Rate and Rhythm: Normal rate and regular rhythm  Pulses: Normal pulses  Heart sounds: Normal heart sounds  No murmur heard  Pulmonary:      Effort: Pulmonary effort is normal  No respiratory distress  Breath sounds: Normal breath sounds  Chest:   Breasts:      Right: No swelling, bleeding, inverted nipple, mass, nipple discharge, skin change, tenderness, axillary adenopathy or supraclavicular adenopathy  Left: No swelling, bleeding, inverted nipple, mass, nipple discharge, skin change, tenderness, axillary adenopathy or supraclavicular adenopathy  Abdominal:      General: There is no distension  Palpations: There is no mass  Tenderness: There is no abdominal tenderness  There is no guarding or rebound  Hernia: No hernia is present  There is no hernia in the left inguinal area or right inguinal area  Genitourinary:     General: Normal vulva  Labia:         Right: No rash, tenderness or lesion  Left: No rash, tenderness or lesion  Comments: Uterus anteverted normal size and contour freely mobile and nontender  No palpable adnexal masses or tenderness  Cervix normal   Vagina with atrophic changes  Bartholin's and Foley's glands normal   Urethral orifice normal   No cystocele or rectocele  Musculoskeletal:      Cervical back: Normal range of motion and neck supple  No tenderness  Lymphadenopathy:      Cervical: No cervical adenopathy  Upper Body:      Right upper body: No supraclavicular, axillary or pectoral adenopathy  Left upper body: No supraclavicular, axillary or pectoral adenopathy  Lower Body: No right inguinal adenopathy  No left inguinal adenopathy  Neurological:      Mental Status: She is alert

## 2022-07-19 LAB
LAB AP GYN PRIMARY INTERPRETATION: NORMAL
Lab: NORMAL

## 2022-07-20 DIAGNOSIS — J30.1 SEASONAL ALLERGIC RHINITIS DUE TO POLLEN: Chronic | ICD-10-CM

## 2022-07-20 RX ORDER — MONTELUKAST SODIUM 10 MG/1
TABLET ORAL
Qty: 90 TABLET | Refills: 1 | Status: SHIPPED | OUTPATIENT
Start: 2022-07-20

## 2022-08-29 ENCOUNTER — TELEPHONE (OUTPATIENT)
Dept: OBGYN CLINIC | Facility: CLINIC | Age: 72
End: 2022-08-29

## 2022-08-29 NOTE — TELEPHONE ENCOUNTER
Dr Gomez  Re: Question  Cb#: [phone]      Patient had right hip replaced in 2019 , she is due for her 3 year follow up  She is wanting to know if this needs to be exactly three years or if it is ok to be earlier? She is also now having issues with her left hip   She states it does flare up , she is wanting to know if it is beneficial to be seen during a flare up or if it is ok to be seen at anytime for this       Please advise and call her back

## 2022-09-02 ENCOUNTER — OFFICE VISIT (OUTPATIENT)
Dept: OBGYN CLINIC | Facility: CLINIC | Age: 72
End: 2022-09-02
Payer: COMMERCIAL

## 2022-09-02 ENCOUNTER — HOSPITAL ENCOUNTER (OUTPATIENT)
Dept: RADIOLOGY | Facility: CLINIC | Age: 72
End: 2022-09-02
Payer: COMMERCIAL

## 2022-09-02 VITALS
BODY MASS INDEX: 31.39 KG/M2 | SYSTOLIC BLOOD PRESSURE: 124 MMHG | TEMPERATURE: 97.2 F | HEART RATE: 62 BPM | DIASTOLIC BLOOD PRESSURE: 78 MMHG | WEIGHT: 170.6 LBS | HEIGHT: 62 IN

## 2022-09-02 DIAGNOSIS — Z96.641 PRESENCE OF RIGHT ARTIFICIAL HIP JOINT: Primary | ICD-10-CM

## 2022-09-02 DIAGNOSIS — M25.562 ACUTE PAIN OF LEFT KNEE: ICD-10-CM

## 2022-09-02 DIAGNOSIS — Z96.641 PRESENCE OF RIGHT ARTIFICIAL HIP JOINT: ICD-10-CM

## 2022-09-02 PROCEDURE — 73564 X-RAY EXAM KNEE 4 OR MORE: CPT

## 2022-09-02 PROCEDURE — 73502 X-RAY EXAM HIP UNI 2-3 VIEWS: CPT

## 2022-09-02 PROCEDURE — 99214 OFFICE O/P EST MOD 30 MIN: CPT | Performed by: ORTHOPAEDIC SURGERY

## 2022-09-02 PROCEDURE — 1124F ACP DISCUSS-NO DSCNMKR DOCD: CPT | Performed by: ORTHOPAEDIC SURGERY

## 2022-09-02 PROCEDURE — 1160F RVW MEDS BY RX/DR IN RCRD: CPT | Performed by: ORTHOPAEDIC SURGERY

## 2022-09-02 NOTE — PROGRESS NOTES
ASSESSMENT/PLAN:    Problem List Items Addressed This Visit        Other    Presence of right artificial hip joint - Primary    Relevant Orders    XR hip/pelv 2-3 vws right if performed      Other Visit Diagnoses     Acute pain of left knee        Relevant Orders    XR knee 4+ vw left injury          The patient is 33 months post right hip hemiarthroplasty performed on 12/9/2019  X-rays taken today in office demonstrate intact hardware without signs of loosening or failure  The patient presented with excellent hip ROM and strength on exam  She was instructed to continue with daily exercises  She will return to the office in 2 years for recheck and repeat x-rays of the right hip  The patient was instructed to call or return to the office sooner if any problems arise  The patient also presented to the office today with a complaint of left knee pain, which she notes has begun to improve  X-rays taken today in office were reviewed and discussed with the patient, which shows mild degenerative changes along the medial joint and patellofemoral compartments  The patient presented today with tenderness to palpation along the pes anserine bursa, with full strength and ROM on exam  Management options discussed with the patient in detail today for her left knee pain  The patient has opted to continue with Tylenol/NSAIDs, ice, compression wrap for symptom relief as needed, as well as home exercises  She will return as needed for her left knee  Return in about 2 years (around 9/2/2024)  _____________________________________________________  CHIEF COMPLAINT:  Chief Complaint   Patient presents with    Right Hip - Pain    Left Hip - Pain         SUBJECTIVE:  Olman Hoffman is a 81 y o  female who presents for follow up of her right hip s/p hemiarthroplasty performed on 12/9/2019  The patient returns today a few months earlier than her scheduled 3 year follow up with complaints of left knee pain   In regards to the patient's right hip she is doing very well  The patient denies any pain, swelling, numbness or tingling in the right lower extremity  She is able to ambulate well on her right lower extremity without complaints  The patient has no questions or concerns regarding her right hip  Today the patient complains of left knee pain  She states that her left knee issues all seemed to start approximately two years ago when she stood up from a table and twisted her knee  At that time she had seen her PCP, and noted that her symptoms resolved in a few days with simple conservative management  The patient states that the following year she sustained the same type of symptom complex to her left knee, which also seemed to resolve  Then this past summer she has sustained twisting injuries to her left knee twice, the last occurring 2 weeks ago  Now she continues to have ongoing pain along the medial aspect of the left knee which worsens with activity such as prolonged ambulation, stairs  She denies any swelling, numbness, or tingling in the lower extremity  The patient admits that at its worst the pain seemed to radiate all the way down the leg and up into the hip, which lasted for a day  She has been taking Advil, Tylenol, and using a soft compression sleeve for pain relief, and notes that she takes daily walks  Today the pain seems to be improving, though is still present  The patient has never had any physical therapy, injections, or surgeries to the left lower extremity  She admits to a feeling of instability in the knee when she walks  The patient denies any locking or catching, clicking, or popping in the knee          PAST MEDICAL HISTORY:  Past Medical History:   Diagnosis Date    Essential hypertension     Hyperlipidemia     Skin cancer     1991       PAST SURGICAL HISTORY:  Past Surgical History:   Procedure Laterality Date    BASAL CELL CARCINOMA EXCISION      BREAST EXCISIONAL BIOPSY Left     Benign- 1991    BREAST SURGERY      Biopsy     HERNIA REPAIR      HIP SURGERY      AL PARTIAL HIP REPLACEMENT Right 12/9/2019    Procedure: HEMIARTHROPLASTY HIP (BIPOLAR); Surgeon: Enoc James;   Location: [facility];  Service: Orthopedics       FAMILY HISTORY:  Family History   Problem Relation Age of Onset    Colon cancer Paternal Grandmother     Diabetes Mother     Hypertension Mother     Coronary artery disease Mother     Coronary artery disease Father     No Known Problems Sister     No Known Problems Daughter     No Known Problems Maternal Grandmother     No Known Problems Maternal Grandfather     Lung cancer Paternal Grandfather     No Known Problems Daughter     Lung cancer Paternal Aunt     No Known Problems Paternal Aunt        SOCIAL HISTORY:  Social History     Tobacco Use    Smoking status: Never Smoker    Smokeless tobacco: Never Used   Vaping Use    Vaping Use: Never used   Substance Use Topics    Alcohol use: Yes     Comment: Occasionally     Drug use: Never       MEDICATIONS:    Current Outpatient Medications:     Ascorbic Acid (vitamin C) 100 MG tablet, Take 100 mg by mouth daily, Disp: , Rfl:     calcium carbonate-vitamin D (OSCAL-D) 500 mg-200 units per tablet, Take 1 tablet by mouth daily, Disp: , Rfl:     Misc Natural Products (GLUCOSAMINE CHOND COMPLEX/MSM PO), Take 1,500 mg by mouth, Disp: , Rfl:     montelukast (SINGULAIR) 10 mg tablet, TAKE 1 TABLET BY MOUTH EVERY DAY, Disp: 90 tablet, Rfl: 1    omeprazole (PriLOSEC) 20 mg delayed release capsule, Take 20 mg by mouth as needed, Disp: , Rfl:     triamterene-hydrochlorothiazide (MAXZIDE-25) 37 5-25 mg per tablet, TAKE 1 TABLET BY MOUTH EVERY DAY, Disp: 90 tablet, Rfl: 1    Turmeric 500 MG CAPS, Take by mouth, Disp: , Rfl:     vitamin B-12 (VITAMIN B-12) 500 mcg tablet, Take 500 mcg by mouth daily, Disp: , Rfl:     ALLERGIES:  Allergies   Allergen Reactions    Codeine Hives       REVIEW OF SYSTEMS:  Pertinent items are noted in HPI  A comprehensive review of systems was negative       _____________________________________________________  PHYSICAL EXAMINATION:  General: well developed and well nourished, alert, oriented times 3 and appears comfortable  Psychiatric: Normal  HEENT:  Normocephalic, atraumatic  Cardiovascular:  Regular  Pulmonary: No wheezing or stridor  Skin: No masses, erthema, lacerations, fluctation, ulcerations  Neurovascular:  Strong distal pulses, sensory and motor testing intact     MUSCULOSKELETAL EXAMINATION:    Right hip:  -the patient is able to ambulate well on her own without abnormal gait, negative Trendelenburg  - surgical incision is well healed  Skin without lesions, effusion, ecchymosis, erythema warmth, swelling or other signs infection  -there is no palpable tenderness  -intact active and passive hip range of motion without complaint  -full active range of motion of the knee and ankle  -5/5 strength with resisted knee flexion/extension, sitting in flexion  -soft lower extremity compartments  -sensation intact to light touch distally  -lower extremity is well perfused    Left knee:  - skin without lesions, effusion, ecchymosis, erythema, warmth, swelling, or other signs of infection  -patient does complain of palpable tenderness along the pes anserine bursa  No palpable tenderness along the lateral or medial joint line spaces, anteriorly, or posteriorly    - full active knee range of motion 0-130 degrees without complaint  -knee stable to varus and valgus stress  -negative anterior/posterior drawer  -negative Hugo's without pain  - intact active ankle and hip range of motion without complaint  -5/5 strength with resisted ankle dorsiflexion/plantar flexion, knee flexion/extension  -soft lower extremity compartments  -sensation intact to light touch distally  -lower extremity is well perfused    _____________________________________________________  STUDIES REVIEWED:  I have personally reviewed pertinent films and reports in PACS and my interpretation is as follows:     X-rays of the right hip taken today in office demonstrate intact hemiarthroplasty hardware without signs of loosening or failure  X-rays of the left knee taken in office demonstrate no signs of fracture or dislocation  Mild degenerative changes along the medial joint compartment as well as patellofemoral  Subchondral sclerosis noted, no evidence of osteophyte formation  No bony lesions               Leanna Marte

## 2022-10-13 ENCOUNTER — CLINICAL SUPPORT (OUTPATIENT)
Dept: FAMILY MEDICINE CLINIC | Facility: CLINIC | Age: 72
End: 2022-10-13
Payer: COMMERCIAL

## 2022-10-13 VITALS — BODY MASS INDEX: 31.73 KG/M2 | WEIGHT: 172.4 LBS | HEIGHT: 62 IN

## 2022-10-13 DIAGNOSIS — Z23 FLU VACCINE NEED: Primary | ICD-10-CM

## 2022-10-13 PROCEDURE — 90662 IIV NO PRSV INCREASED AG IM: CPT

## 2022-10-13 PROCEDURE — G0008 ADMIN INFLUENZA VIRUS VAC: HCPCS

## 2022-11-07 ENCOUNTER — HOSPITAL ENCOUNTER (OUTPATIENT)
Dept: RADIOLOGY | Facility: CLINIC | Age: 72
Discharge: HOME | End: 2022-11-07

## 2022-11-07 VITALS — WEIGHT: 168 LBS | HEIGHT: 64 IN | BODY MASS INDEX: 28.68 KG/M2

## 2022-11-07 DIAGNOSIS — Z12.31 ENCOUNTER FOR SCREENING MAMMOGRAM FOR MALIGNANT NEOPLASM OF BREAST: ICD-10-CM

## 2023-01-13 DIAGNOSIS — J30.1 SEASONAL ALLERGIC RHINITIS DUE TO POLLEN: Chronic | ICD-10-CM

## 2023-01-13 RX ORDER — MONTELUKAST SODIUM 10 MG/1
TABLET ORAL
Qty: 90 TABLET | Refills: 1 | Status: SHIPPED | OUTPATIENT
Start: 2023-01-13

## 2023-01-23 DIAGNOSIS — I10 ESSENTIAL HYPERTENSION: ICD-10-CM

## 2023-01-23 RX ORDER — TRIAMTERENE AND HYDROCHLOROTHIAZIDE 37.5; 25 MG/1; MG/1
1 TABLET ORAL DAILY
Qty: 90 TABLET | Refills: 1 | Status: SHIPPED | OUTPATIENT
Start: 2023-01-23

## 2023-01-23 NOTE — TELEPHONE ENCOUNTER
She does have an appointment scheduled for May  Needs to go for labs  Orders are in [facility]

## 2023-03-21 NOTE — DISCHARGE INSTRUCTIONS
Progress Note - Text


Progress Note Date: 03/21/23





Interval History:


Patient was seen laying in his bed today and was directable and agreeable to s

peak with writer in the office.  Patient continues to appear disheveled in 

appearance.  He continues to have a poverty of content and constricted affect.  

He states that his mood is still depressed however is denying any anxiety at 

this time.  He states that he is able to sleep fairly last night.  Continues to 

be fairly superficial but wanting to go to rehab and productivity of possibly 

going to the shelter or his father's house.  He claims that he is going to some 

groups however is fairly vague about which ones he is going to.  Claims as a 

fair appetite.  Mildly improving insight and judgment.  At this time patient 

denies any suicidal or homical ideations, intent or plan. Patient denies any 

auditory, visual hallucinations and denies any paranoia or delusions. Patient 

denies any side effects from the medications and has been compliant with meds. 





Mental Status Exam:


General Appearance: Patient appears to be thin, tall, disheveled appearance, 

mildly improving hygiene. stated age is alert, difficult to direct and engage 

with. Patient appears to have mildly improving hygiene and grooming.


Behavior: Patient is seated without any agitated behavior. Poor eye contact, 

mildly improving


Speech: Patient's speech is fluent and nonpressured. [place].


Mood/Affect: Patient reports their mood is depressed improving mildly, affect is

congruent and constricted


Suicidality/Homicidality:  Patient denies having any homicidal ideation intent 

or plan. Denies any suicidal ideations intent or plan  


Perceptions: Patient denies any visual hallucinations and denies any auditory 

hallucinations


Though content/process: [place], poverty of content. no paranoia. more goal 

oriented


Memory and concentration: AOX3, grossly intact for the purposes of this session


Judgment and insight: poor, improving mildly





IMPRESSIONS: 


Major depressive disorder severe with psychotic features


homelessness


alcohol use disorder moderate


methamphetamine abuse


cannabis use disorder


nicotine dependence





Plan:


-Patient continues to meet criteria for inpatient psychiatric admission for 

symptom stabilization and safety. Patient has signed adult voluntary form and 

medication consent and was placed in patient's chart.


-Medications:  Abilify 5 mg daily for mood adjunct/psychosis, increased Effexor 

to 225 mg daily for mood/anxiety, trazodone 50 mg daily at bedtime for 

mood/insomnia.  Continue with naltrexone 50 mg daily for alcohol cravings.


-When necessary Ativan and Haldol for agitation/aggression.


-NRT - nicotine patch


-SW on board for discharge planning.  Encouraged the patient to participate in 

milieu. encouraged patient to call access line for intake date for rehab or will

likely go to shelter [facility]. likely discharge in 1-2 days depending 

on disposition. Weight-bearing as tolerated right lower extremity  Ambulate as tolerated, trying to get up and exercise on a daily basis  Dressings, right hip, removed on postop day 7 , 12/16/2019  Abduction pillow between legs when in bed for 6 weeks postoperatively  Lovenox for 3 weeks postoperatively  Analgesics as needed for pain  Follow up in my office on 12/20/2019  Contact the office if questions or concerns arise regarding your right hip  Enoxaparin (By injection)   Enoxaparin (ee-nox-a-PAR-in)  Prevents and treats blood clots  Also treats heart attacks  This medicine is a blood thinner  Brand Name(s): [employer] Choice Enoxaparin Sodium, Enoxaparin Sodium Novaplus, Lovenox, PremierPro Rx Lovenox   There may be other brand names for this medicine  When This Medicine Should Not Be Used: You should not use this medicine if you have had an allergic reaction to enoxaparin, heparin, benzyl alcohol, or products made from pork  You should not use enoxaparin if you have bleeding disorders or any active bleeding  How to Use This Medicine:   Injectable  · Your doctor will prescribe your exact dose and tell you how often it should be given  This medicine is given as a shot under your skin  · A nurse or other health provider will give you this medicine  It may also be given by a home health caregiver  · You may be taught how to give your medicine at home  Make sure you understand all instructions before giving yourself an injection  Do not use more medicine or use it more often than your doctor tells you to  · You will be shown the body areas where this shot can be given  Use a different body area each time you give yourself a shot  Keep track of where you give each shot to make sure you rotate body areas  · Use a new needle and syringe each time you inject your medicine  If a dose is missed:   · You must use this medicine on a fixed schedule  Call your doctor or pharmacist if you miss a dose    How to Store and Dispose of This Medicine:   · If you store this medicine at home, keep it at room temperature, away from heat and direct light  · If you were given a bottle of medicine to use with your syringes, you must use the medicine within 28 days after the first shot  Throw away the unused medicine in the bottle after 28 days  · Throw away used needles in a hard, closed container that the needles cannot poke through  Keep this container away from children and pets  · Ask your pharmacist, doctor, or health caregiver about the best way to dispose of any leftover medicine, containers, and other supplies  Throw away old medicine after the expiration date has passed  · Keep all medicine out of the reach of children  Never share your medicine with anyone  Drugs and Foods to Avoid:   Ask your doctor or pharmacist before using any other medicine, including over-the-counter medicines, vitamins, and herbal products  · Make sure your doctor knows if you are also using blood thinners (such as clopidogrel, warfarin, or Coumadin®)  Tell your doctor if you are also using dipyridamole (Persantine®), ketorolac (Toradol®), or sulfinpyrazone (Anturane®)  · Make sure your doctor knows if you are using pain or arthritis medicine (such as aspirin, Advil®, Aleve®, Motrin®, Orudis®, Dolobid®, [place], Indocin®, Relafen®, or Voltaren®)  Avoid taking aspirin or medicines that contain aspirin, unless your doctor tells you to  Warnings While Using This Medicine:   · Make sure your doctor knows if you are pregnant or breastfeeding, or if you have liver disease, kidney disease, blood vessel problems, diabetes, a heart infection, uncontrolled high blood pressure, a stomach ulcer or bleeding, or a bleeding disorder such as hemophilia  Tell your doctor if you have a bleeding disorder caused by heparin  · Make sure your doctor knows if you have recently had a stroke, or surgery on your eyes, brain, or spine   Tell your doctor if you have had a heart valve replaced  · This medicine may cause bleeding or bruising  This risk is higher if you have a catheter in your back for pain medicine or anesthesia (sometimes called an "epidural"), or if you have kidney problems  The risk of bleeding increases if your kidney problems get worse  Discuss this with your doctor if you are concerned  · You may bleed and bruise more easily while you are using this medicine  Be extra careful to avoid injuries until the effects of the medicine have worn off  Stay away from rough sports or other situations where you could be bruised, cut, or injured  Brush and floss your teeth gently  Be careful when using sharp objects, including razors and fingernail clippers  Avoid picking your nose  If you need to blow your nose, blow it gently  · Watch for any bleeding from open areas such as around the injection site  Also check for blood in your urine or stool  If you have any bleeding or injuries, tell your doctor right away  · Tell any doctor or dentist who treats you that you are using this medicine  You may need to stop using this medicine several days before you have surgery or medical tests  · Your doctor will do lab tests at regular visits to check on the effects of this medicine  Keep all appointments  Possible Side Effects While Using This Medicine:   Call your doctor right away if you notice any of these side effects:  · Allergic reaction: Itching or hives, swelling in your face or hands, swelling or tingling in your mouth or throat, chest tightness, trouble breathing  · Blood in your urine  · Bloody or black, tarry stools  · Chest pain, shortness of breath, or coughing up blood  · Fever  · Large, flat, blue or purplish patches in the skin  · Numbness or weakness in your arm or leg, or on one side of your body  · Pain in your lower leg (calf)  · Sudden or severe headache, problems with vision, speech, or walking  · Swelling in your hands, ankles, or feet    · Uneven heartbeat  · Unusual bleeding or bruising  · Vomiting blood or material that looks like coffee grounds  · Warmth or redness in your face, neck, arms, or upper chest   If you notice these less serious side effects, talk with your doctor:   · Confusion  · Nausea or diarrhea  · Pain, redness, bruising, swelling, or a lump under your skin where the shot was given  If you notice other side effects that you think are caused by this medicine, tell your doctor  Call your doctor for medical advice about side effects  You may report side effects to FDA at 0-076-FDA-3551  © 2017 [address] [employer] Information is for End User's use only and may not be sold, redistributed or otherwise used for commercial purposes  The above information is an  only  It is not intended as medical advice for individual conditions or treatments  Talk to your doctor, nurse or pharmacist before following any medical regimen to see if it is safe and effective for you

## 2023-05-23 ENCOUNTER — RA CDI HCC (OUTPATIENT)
Dept: OTHER | Facility: HOSPITAL | Age: 73
End: 2023-05-23

## 2023-05-23 NOTE — PROGRESS NOTES
[facility] 75  coding opportunities       Chart reviewed, no opportunity found:   [address]        Patients Insurance     Medicare Insurance: [employer] Advantage

## 2023-05-30 ENCOUNTER — OFFICE VISIT (OUTPATIENT)
Dept: FAMILY MEDICINE CLINIC | Facility: CLINIC | Age: 73
End: 2023-05-30

## 2023-05-30 VITALS
HEIGHT: 64 IN | DIASTOLIC BLOOD PRESSURE: 63 MMHG | TEMPERATURE: 97.6 F | WEIGHT: 171.6 LBS | OXYGEN SATURATION: 95 % | BODY MASS INDEX: 29.3 KG/M2 | HEART RATE: 59 BPM | SYSTOLIC BLOOD PRESSURE: 134 MMHG

## 2023-05-30 DIAGNOSIS — K21.00 GASTROESOPHAGEAL REFLUX DISEASE WITH ESOPHAGITIS WITHOUT HEMORRHAGE: Chronic | ICD-10-CM

## 2023-05-30 DIAGNOSIS — E78.49 OTHER HYPERLIPIDEMIA: ICD-10-CM

## 2023-05-30 DIAGNOSIS — I10 ESSENTIAL HYPERTENSION: Primary | Chronic | ICD-10-CM

## 2023-05-30 DIAGNOSIS — B00.1 HERPES LABIALIS: ICD-10-CM

## 2023-05-30 PROBLEM — D50.0 BLOOD LOSS ANEMIA: Status: RESOLVED | Noted: 2019-12-11 | Resolved: 2023-05-30

## 2023-05-30 PROBLEM — E66.3 OVERWEIGHT (BMI 25.0-29.9): Status: ACTIVE | Noted: 2022-05-24

## 2023-05-30 RX ORDER — VALACYCLOVIR HYDROCHLORIDE 500 MG/1
500 TABLET, FILM COATED ORAL 2 TIMES DAILY
Qty: 6 TABLET | Refills: 1 | Status: SHIPPED | OUTPATIENT
Start: 2023-05-30 | End: 2023-06-02

## 2023-05-30 NOTE — PATIENT INSTRUCTIONS
Medicare Preventive Visit Patient Instructions  Thank you for completing your Welcome to Medicare Visit or Medicare Annual Wellness Visit today  Your next wellness visit will be due in one year (5/30/2024)  The screening/preventive services that you may require over the next 5-10 years are detailed below  Some tests may not apply to you based off risk factors and/or age  Screening tests ordered at today's visit but not completed yet may show as past due  Also, please note that scanned in results may not display below  Preventive Screenings:  Service Recommendations Previous Testing/Comments   Colorectal Cancer Screening  * Colonoscopy    * Fecal Occult Blood Test (FOBT)/Fecal Immunochemical Test (FIT)  * Fecal DNA/Cologuard Test  * Flexible Sigmoidoscopy Age: 39-70 years old   Colonoscopy: every 10 years (may be performed more frequently if at higher risk)  OR  FOBT/FIT: every 1 year  OR  Cologuard: every 3 years  OR  Sigmoidoscopy: every 5 years  Screening may be recommended earlier than age 39 if at higher risk for colorectal cancer  Also, an individualized decision between you and your healthcare provider will decide whether screening between the ages of 74-80 would be appropriate  Colonoscopy: 11/01/2016  FOBT/FIT: Not on file  Cologuard: Not on file  Sigmoidoscopy: Not on file          Breast Cancer Screening Age: 36 years old  Frequency: every 1-2 years  Not required if history of left and right mastectomy Mammogram: 11/07/2022        Cervical Cancer Screening Between the ages of 21-29, pap smear recommended once every 3 years  Between the ages of 33-67, can perform pap smear with HPV co-testing every 5 years     Recommendations may differ for women with a history of total hysterectomy, cervical cancer, or abnormal pap smears in past  Pap Smear: 07/12/2022        Hepatitis C Screening Once for adults born between 1945 and 1965  More frequently in patients at high risk for Hepatitis C Hep C Antibody: 10/05/2021        Diabetes Screening 1-2 times per year if you're at risk for diabetes or have pre-diabetes Fasting glucose: 101 mg/dL (10/5/2021)  A1C: No results in last 5 years (No results in last 5 years)      Cholesterol Screening Once every 5 years if you don't have a lipid disorder  May order more often based on risk factors  Lipid panel: 10/05/2021          Other Preventive Screenings Covered by Medicare:  1  Abdominal Aortic Aneurysm (AAA) Screening: covered once if your at risk  You're considered to be at risk if you have a family history of AAA  2  Lung Cancer Screening: covers low dose CT scan once per year if you meet all of the following conditions: (1) Age 50-69; (2) No signs or symptoms of lung cancer; (3) Current smoker or have quit smoking within the last 15 years; (4) You have a tobacco smoking history of at least 20 pack years (packs per day multiplied by number of years you smoked); (5) You get a written order from a healthcare provider  3  Glaucoma Screening: covered annually if you're considered high risk: (1) You have diabetes OR (2) Family history of glaucoma OR (3)  aged 48 and older OR (3)  American aged 72 and older  3  Osteoporosis Screening: covered every 2 years if you meet one of the following conditions: (1) You're estrogen deficient and at risk for osteoporosis based off medical history and other findings; (2) Have a vertebral abnormality; (3) On glucocorticoid therapy for more than 3 months; (4) Have primary hyperparathyroidism; (5) On osteoporosis medications and need to assess response to drug therapy  · Last bone density test (DXA Scan): 11/05/2021   5  HIV Screening: covered annually if you're between the age of 15-65  Also covered annually if you are younger than 13 and older than 72 with risk factors for HIV infection  For pregnant patients, it is covered up to 3 times per pregnancy      Immunizations:  Immunization Recommendations   Influenza Vaccine Annual influenza vaccination during flu season is recommended for all persons aged >= 6 months who do not have contraindications   Pneumococcal Vaccine   * Pneumococcal conjugate vaccine = PCV13 (Prevnar 13), PCV15 (Vaxneuvance), PCV20 (Prevnar 20)  * Pneumococcal polysaccharide vaccine = PPSV23 (Pneumovax) Adults 25-60 years old: 1-3 doses may be recommended based on certain risk factors  Adults 72 years old: 1-2 doses may be recommended based off what pneumonia vaccine you previously received   Hepatitis B Vaccine 3 dose series if at intermediate or high risk (ex: diabetes, end stage renal disease, liver disease)   Tetanus (Td) Vaccine - COST NOT COVERED BY MEDICARE PART B Following completion of primary series, a booster dose should be given every 10 years to maintain immunity against tetanus  Td may also be given as tetanus wound prophylaxis  Tdap Vaccine - COST NOT COVERED BY MEDICARE PART B Recommended at least once for all adults  For pregnant patients, recommended with each pregnancy  Shingles Vaccine (Shingrix) - COST NOT COVERED BY MEDICARE PART B  2 shot series recommended in those aged 48 and above     Health Maintenance Due:      Topic Date Due   • Breast Cancer Screening: Mammogram  11/07/2023   • Colorectal Cancer Screening  11/01/2026   • Hepatitis C Screening  Completed     Immunizations Due:      Topic Date Due   • Pneumococcal Vaccine: 65+ Years (2 - PCV) 05/04/2018   • COVID-19 Vaccine (4 - Moderna series) 01/06/2022     Advance Directives   What are advance directives? Advance directives are legal documents that state your wishes and plans for medical care  These plans are made ahead of time in case you lose your ability to make decisions for yourself  Advance directives can apply to any medical decision, such as the treatments you want, and if you want to donate organs  What are the types of advance directives?   There are many types of advance directives, and each state has rules about how to use them  You may choose a combination of any of the following:  · Living will: This is a written record of the treatment you want  You can also choose which treatments you do not want, which to limit, and which to stop at a certain time  This includes surgery, medicine, IV fluid, and tube feedings  · Durable power of  for healthcare [facility]): This is a written record that states who you want to make healthcare choices for you when you are unable to make them for yourself  This person, called a proxy, is usually a family member or a friend  You may choose more than 1 proxy  · Do not resuscitate (DNR) order:  A DNR order is used in case your heart stops beating or you stop breathing  It is a request not to have certain forms of treatment, such as CPR  A DNR order may be included in other types of advance directives  · Medical directive: This covers the care that you want if you are in a coma, near death, or unable to make decisions for yourself  You can list the treatments you want for each condition  Treatment may include pain medicine, surgery, blood transfusions, dialysis, IV or tube feedings, and a ventilator (breathing machine)  · Values history: This document has questions about your views, beliefs, and how you feel and think about life  This information can help others choose the care that you would choose  Why are advance directives important? An advance directive helps you control your care  Although spoken wishes may be used, it is better to have your wishes written down  Spoken wishes can be misunderstood, or not followed  Treatments may be given even if you do not want them  An advance directive may make it easier for your family to make difficult choices about your care     Weight Management   Why it is important to manage your weight:  Being overweight increases your risk of health conditions such as heart disease, high blood pressure, type 2 diabetes, and certain types of cancer  It can also increase your risk for osteoarthritis, sleep apnea, and other respiratory problems  Aim for a slow, steady weight loss  Even a small amount of weight loss can lower your risk of health problems  How to lose weight safely:  A safe and healthy way to lose weight is to eat fewer calories and get regular exercise  You can lose up about 1 pound a week by decreasing the number of calories you eat by 500 calories each day  Healthy meal plan for weight management:  A healthy meal plan includes a variety of foods, contains fewer calories, and helps you stay healthy  A healthy meal plan includes the following:  · Eat whole-grain foods more often  A healthy meal plan should contain fiber  Fiber is the part of grains, fruits, and vegetables that is not broken down by your body  Whole-grain foods are healthy and provide extra fiber in your diet  Some examples of whole-grain foods are whole-wheat breads and pastas, oatmeal, brown rice, and bulgur  · Eat a variety of vegetables every day  Include dark, leafy greens such as spinach, kale, braden greens, and mustard greens  Eat yellow and orange vegetables such as carrots, sweet potatoes, and winter squash  · Eat a variety of fruits every day  Choose fresh or canned fruit (canned in its own juice or light syrup) instead of juice  Fruit juice has very little or no fiber  · Eat low-fat dairy foods  Drink fat-free (skim) milk or 1% milk  Eat fat-free yogurt and low-fat cottage cheese  Try low-fat cheeses such as mozzarella and other reduced-fat cheeses  · Choose meat and other protein foods that are low in fat  Choose beans or other legumes such as split peas or lentils  Choose fish, skinless poultry (chicken or turkey), or lean cuts of red meat (beef or pork)  Before you cook meat or poultry, cut off any visible fat  · Use less fat and oil  Try baking foods instead of frying them   Add less fat, such as margarine, sour cream, regular salad dressing and mayonnaise to foods  Eat fewer high-fat foods  Some examples of high-fat foods include french fries, doughnuts, ice cream, and cakes  · Eat fewer sweets  Limit foods and drinks that are high in sugar  This includes candy, cookies, regular soda, and sweetened drinks  Exercise:  Exercise at least 30 minutes per day on most days of the week  Some examples of exercise include walking, biking, dancing, and swimming  You can also fit in more physical activity by taking the stairs instead of the elevator or parking farther away from stores  Ask your healthcare provider about the best exercise plan for you  © Copyright [address] 2018 Information is for End User's use only and may not be sold, redistributed or otherwise used for commercial purposes   All illustrations and images included in CareNotes® are the copyrighted property of A D A M , [employer]  or [address] [place] [address]

## 2023-05-30 NOTE — PROGRESS NOTES
Assessment and Plan:     Problem List Items Addressed This Visit        Digestive    Gastro-esophageal reflux disease with esophagitis (Chronic)     Well-controlled on omeprazole just 3 days/week  Cardiovascular and Mediastinum    Essential hypertension - Primary (Chronic)     Good blood pressure control on current regimen  We'll check basic metabolic panel  Relevant Orders    Basic metabolic panel   Other Visit Diagnoses     Herpes labialis        Relevant Medications    valACYclovir (Valtrex) 500 mg tablet    Other hyperlipidemia        Relevant Orders    Lipid panel        No cerumen impaction in the left ear but she does have some irritation of the external canal which was probably caused by the Q-tip  BMI Counseling: Body mass index is 29 92 kg/m²  The BMI is above normal  Nutrition recommendations include moderation in carbohydrate intake  Rationale for BMI follow-up plan is due to patient being overweight or obese  Depression Screening and Follow-up Plan: Patient was screened for depression during today's encounter  They screened negative with a PHQ-2 score of 0  Preventive health issues were discussed with patient, and age appropriate screening tests were ordered as noted in patient's After Visit Summary  Personalized health advice and appropriate referrals for health education or preventive services given if needed, as noted in patient's After Visit Summary  History of Present Illness:     Patient presents for a Medicare Wellness Visit  She reports some itchiness in the left year and has had ear wax build up in the past        Review of Systems:     Review of Systems     Problem List:     Patient Active Problem List   Diagnosis   • Essential hypertension   • Gastro-esophageal reflux disease with esophagitis   • Seasonal allergic rhinitis due to pollen   • Presence of right artificial hip joint   • Overweight (BMI 25 0-29  9)      Past Medical and Surgical History: Past Medical History:   Diagnosis Date   • Essential hypertension    • Hyperlipidemia    • Skin cancer     1991     Past Surgical History:   Procedure Laterality Date   • BASAL CELL CARCINOMA EXCISION     • BREAST EXCISIONAL BIOPSY Left     Benign- 1991   • HERNIA REPAIR     • HIP SURGERY     • LA HEMIARTHROPLASTY HIP PARTIAL Right 12/09/2019    Procedure: HEMIARTHROPLASTY HIP (BIPOLAR); Surgeon: Ale Hernández; Location:  MAIN OR;  Service: Orthopedics      Family History:     Family History   Problem Relation Age of Onset   • Colon cancer Paternal Grandmother    • Diabetes Mother    • Hypertension Mother    • Coronary artery disease Mother    • Coronary artery disease Father    • No Known Problems Sister    • No Known Problems Daughter    • No Known Problems Maternal Grandmother    • No Known Problems Maternal Grandfather    • Lung cancer Paternal Grandfather    • No Known Problems Daughter    • Lung cancer Paternal Aunt    • No Known Problems Paternal Aunt       Social History:     Social History     Socioeconomic History   • Marital status: /Civil Union     Spouse name: None   • Number of children: None   • Years of education: None   • Highest education level: None   Occupational History   • None   Tobacco Use   • Smoking status: Never   • Smokeless tobacco: Never   Vaping Use   • Vaping Use: Never used   Substance and Sexual Activity   • Alcohol use: Yes     Comment: Occasionally    • Drug use: Never   • Sexual activity: Not Currently   Other Topics Concern   • None   Social History Narrative    Home is smoke-free      Social Determinants of Health     Financial Resource Strain: Low Risk  (5/23/2023)    Overall Financial Resource Strain (CARDIA)    • Difficulty of Paying Living Expenses: Not hard at all   Food Insecurity: Not on file   Transportation Needs: No Transportation Needs (5/23/2023)    PRAPARE - Transportation    • Lack of Transportation (Medical):  No    • Lack of Transportation (Non-Medical): No   Physical Activity: Not on file   Stress: Not on file   Social Connections: Not on file   Intimate Partner Violence: Not on file   Housing Stability: Not on file      Medications and Allergies:     Current Outpatient Medications   Medication Sig Dispense Refill   • Ascorbic Acid (vitamin C) 100 MG tablet Take 100 mg by mouth daily     • calcium carbonate-vitamin D (OSCAL-D) 500 mg-200 units per tablet Take 1 tablet by mouth daily     • Misc Natural Products (GLUCOSAMINE CHOND COMPLEX/MSM PO) Take 1,500 mg by mouth     • montelukast (SINGULAIR) 10 mg tablet TAKE 1 TABLET BY MOUTH EVERY DAY 90 tablet 1   • omeprazole (PriLOSEC) 20 mg delayed release capsule Take 20 mg by mouth as needed     • triamterene-hydrochlorothiazide (MAXZIDE-25) 37 5-25 mg per tablet Take 1 tablet by mouth daily 90 tablet 1   • Turmeric 500 MG CAPS Take by mouth     • valACYclovir (Valtrex) 500 mg tablet Take 1 tablet (500 mg total) by mouth 2 (two) times a day for 3 days 6 tablet 1   • vitamin B-12 (VITAMIN B-12) 500 mcg tablet Take 500 mcg by mouth daily       No current facility-administered medications for this visit       Allergies   Allergen Reactions   • Codeine Hives      Immunizations:     Immunization History   Administered Date(s) Administered   • COVID-19 MODERNA VACC 0 5 ML IM 01/24/2021, 02/24/2021, 11/11/2021   • INFLUENZA 11/10/2017, 09/17/2018, 10/13/2022   • Influenza Quadrivalent 3 years and older 11/10/2017, 09/17/2018   • Influenza, high dose seasonal 0 7 mL 09/29/2020, 10/11/2021, 10/13/2022   • Influenza, recombinant, quadrivalent,injectable, preservative free 11/05/2019   • Influenza, seasonal, injectable 11/12/2007, 10/17/2011, 10/01/2012, 09/09/2013, 10/15/2014, 10/22/2015, 11/03/2016   • Pneumococcal Polysaccharide PPV23 05/04/2017   • Td (adult), adsorbed 04/14/2008      Health Maintenance:         Topic Date Due   • Breast Cancer Screening: Mammogram  11/07/2023   • Colorectal Cancer Screening 11/01/2026   • Hepatitis C Screening  Completed         Topic Date Due   • Pneumococcal Vaccine: 65+ Years (2 - PCV) 05/04/2018   • COVID-19 Vaccine (4 - Moderna series) 01/06/2022      Medicare Screening Tests and Risk Assessments:     Last Medicare Wellness visit information reviewed, patient interviewed and updates made to the record today  Health Risk Assessment:   Patient rates overall health as excellent  Patient feels that their physical health rating is same  Patient is very satisfied with their life  Eyesight was rated as same  Hearing was rated as same  Patient feels that their emotional and mental health rating is same  Patients states they are never, rarely angry  Patient states they are never, rarely unusually tired/fatigued  Pain experienced in the last 7 days has been none  Patient states that she has experienced no weight loss or gain in last 6 months  Depression Screening:   PHQ-2 Score: 0      Fall Risk Screening: In the past year, patient has experienced: no history of falling in past year      Urinary Incontinence Screening:   Patient has not leaked urine accidently in the last six months  Home Safety:  Patient does not have trouble with stairs inside or outside of their home  Patient has working smoke alarms and has working carbon monoxide detector  Home safety hazards include: none  Nutrition:   Current diet is Low Carb  Medications:   Patient is currently taking over-the-counter supplements  OTC medications include: See medication list  Patient is able to manage medications  Activities of Daily Living (ADLs)/Instrumental Activities of Daily Living (IADLs):   Walk and transfer into and out of bed and chair?: Yes  Dress and groom yourself?: Yes    Bathe or shower yourself?: Yes    Feed yourself?  Yes  Do your laundry/housekeeping?: Yes  Manage your money, pay your bills and track your expenses?: Yes  Make your own meals?: Yes    Do your own shopping?: Yes    Previous Hospitalizations:   Any hospitalizations or ED visits within the last 12 months?: No      Advance Care Planning:   Living will: No    Durable POA for healthcare: Yes    Advanced directive: No    Advanced directive counseling given: Yes    End of Life Decisions reviewed with patient: Yes    Provider agrees with end of life decisions: Yes      Comments: Skylar Richey chooses comfort care measures in the event of a terminal diagnosis  Cognitive Screening:   Provider or family/friend/caregiver concerned regarding cognition?: No    PREVENTIVE SCREENINGS      Cardiovascular Screening:    General: Screening Current      Diabetes Screening:     General: Screening Current      Colorectal Cancer Screening:     General: Screening Current      Breast Cancer Screening:     General: Screening Current      Cervical Cancer Screening:    General: Screening Not Indicated      Osteoporosis Screening:    General: Screening Current      Lung Cancer Screening:     General: Screening Not Indicated      Hepatitis C Screening:    General: Screening Current    Screening, Brief Intervention, and Referral to Treatment (SBIRT)    Screening  Typical number of drinks in a day: 0  Typical number of drinks in a week: 2  Interpretation: Low risk drinking behavior  AUDIT-C Screenin) How often did you have a drink containing alcohol in the past year? monthly or less  2) How many drinks did you have on a typical day when you were drinking in the past year? 1 to 2  3) How often did you have 6 or more drinks on one occasion in the past year? never    AUDIT-C Score: 1  Interpretation: Score 0-2 (female): Negative screen for alcohol misuse    Single Item Drug Screening:  How often have you used an illegal drug (including marijuana) or a prescription medication for non-medical reasons in the past year? never    Single Item Drug Screen Score: 0  Interpretation: Negative screen for possible drug use disorder    No results found       Physical Exam: "    /63 (BP Location: Left arm, Patient Position: Sitting, Cuff Size: Adult)   Pulse 59   Temp 97 6 °F (36 4 °C)   Ht 5' 3 5\" (1 613 m)   Wt 77 8 kg (171 lb 9 6 oz)   SpO2 95%   BMI 29 92 kg/m²     Wt Readings from Last 3 Encounters:   05/30/23 77 8 kg (171 lb 9 6 oz)   11/07/22 76 2 kg (168 lb)   10/13/22 78 2 kg (172 lb 6 4 oz)        Physical Exam  Constitutional:       General: She is not in acute distress  Appearance: Normal appearance  She is well-developed  She is not ill-appearing  HENT:      Head:      Comments: There is some erythema on the inferior aspect of the left external canal (she says that she has been using a Q-tip in her left ear)  Right Ear: Tympanic membrane normal  There is no impacted cerumen  Left Ear: Tympanic membrane normal  There is no impacted cerumen  Neck:      Vascular: No JVD  Cardiovascular:      Rate and Rhythm: Normal rate and regular rhythm  Heart sounds: Normal heart sounds  No murmur heard  No friction rub  No gallop  Pulmonary:      Breath sounds: Normal breath sounds  Abdominal:      General: Bowel sounds are normal  There is no distension  Palpations: Abdomen is soft  There is no mass  Musculoskeletal:         General: No swelling  Neurological:      Mental Status: She is alert            Paco Balderas MD  "

## 2023-05-30 NOTE — ASSESSMENT & PLAN NOTE
She is limiting her carbohydrates and I asked her to check with her health plan to see which weight loss medications are covered

## 2023-06-02 ENCOUNTER — APPOINTMENT (OUTPATIENT)
Dept: LAB | Facility: HOSPITAL | Age: 73
End: 2023-06-02
Payer: COMMERCIAL

## 2023-06-02 DIAGNOSIS — I10 ESSENTIAL HYPERTENSION: Chronic | ICD-10-CM

## 2023-06-02 DIAGNOSIS — E78.49 OTHER HYPERLIPIDEMIA: ICD-10-CM

## 2023-06-02 LAB
ANION GAP SERPL CALCULATED.3IONS-SCNC: 8 MMOL/L (ref 4–13)
BUN SERPL-MCNC: 23 MG/DL (ref 5–25)
CALCIUM SERPL-MCNC: 9.2 MG/DL (ref 8.4–10.2)
CHLORIDE SERPL-SCNC: 103 MMOL/L (ref 96–108)
CHOLEST SERPL-MCNC: 177 MG/DL
CO2 SERPL-SCNC: 29 MMOL/L (ref 21–32)
CREAT SERPL-MCNC: 0.85 MG/DL (ref 0.6–1.3)
GFR SERPL CREATININE-BSD FRML MDRD: 69 ML/MIN/1.73SQ M
GLUCOSE P FAST SERPL-MCNC: 98 MG/DL (ref 65–99)
HDLC SERPL-MCNC: 53 MG/DL
LDLC SERPL CALC-MCNC: 102 MG/DL (ref 0–100)
NONHDLC SERPL-MCNC: 124 MG/DL
POTASSIUM SERPL-SCNC: 3.7 MMOL/L (ref 3.5–5.3)
SODIUM SERPL-SCNC: 140 MMOL/L (ref 135–147)
TRIGL SERPL-MCNC: 108 MG/DL

## 2023-06-02 PROCEDURE — 36415 COLL VENOUS BLD VENIPUNCTURE: CPT

## 2023-06-02 PROCEDURE — 80048 BASIC METABOLIC PNL TOTAL CA: CPT

## 2023-06-02 PROCEDURE — 80061 LIPID PANEL: CPT

## 2023-07-19 DIAGNOSIS — I10 ESSENTIAL HYPERTENSION: ICD-10-CM

## 2023-07-19 DIAGNOSIS — J30.1 SEASONAL ALLERGIC RHINITIS DUE TO POLLEN: Chronic | ICD-10-CM

## 2023-07-19 RX ORDER — MONTELUKAST SODIUM 10 MG/1
TABLET ORAL
Qty: 90 TABLET | Refills: 1 | Status: SHIPPED | OUTPATIENT
Start: 2023-07-19

## 2023-07-19 RX ORDER — TRIAMTERENE AND HYDROCHLOROTHIAZIDE 37.5; 25 MG/1; MG/1
TABLET ORAL
Qty: 90 TABLET | Refills: 1 | Status: SHIPPED | OUTPATIENT
Start: 2023-07-19

## 2023-11-01 ENCOUNTER — CLINICAL SUPPORT (OUTPATIENT)
Dept: FAMILY MEDICINE CLINIC | Facility: CLINIC | Age: 73
End: 2023-11-01
Payer: COMMERCIAL

## 2023-11-01 DIAGNOSIS — Z23 ENCOUNTER FOR IMMUNIZATION: Primary | ICD-10-CM

## 2023-11-01 PROCEDURE — G0008 ADMIN INFLUENZA VIRUS VAC: HCPCS

## 2023-11-01 PROCEDURE — 90662 IIV NO PRSV INCREASED AG IM: CPT

## 2023-11-09 ENCOUNTER — HOSPITAL ENCOUNTER (OUTPATIENT)
Dept: RADIOLOGY | Facility: CLINIC | Age: 73
End: 2023-11-09
Payer: COMMERCIAL

## 2023-11-09 VITALS — BODY MASS INDEX: 28.34 KG/M2 | WEIGHT: 166 LBS | HEIGHT: 64 IN

## 2023-11-09 DIAGNOSIS — Z12.31 ENCOUNTER FOR SCREENING MAMMOGRAM FOR MALIGNANT NEOPLASM OF BREAST: ICD-10-CM

## 2023-11-09 PROCEDURE — 77067 SCR MAMMO BI INCL CAD: CPT

## 2023-11-09 PROCEDURE — 77063 BREAST TOMOSYNTHESIS BI: CPT

## 2023-11-26 ENCOUNTER — RA CDI HCC (OUTPATIENT)
Dept: OTHER | Facility: HOSPITAL | Age: 73
End: 2023-11-26

## 2023-11-26 NOTE — PROGRESS NOTES
[address] coding opportunities       Chart reviewed, no opportunity found: [id number]        Patients Insurance     Medicare Insurance: [employer]

## 2023-11-27 ENCOUNTER — OFFICE VISIT (OUTPATIENT)
Dept: FAMILY MEDICINE CLINIC | Facility: CLINIC | Age: 73
End: 2023-11-27
Payer: COMMERCIAL

## 2023-11-27 VITALS
BODY MASS INDEX: 29.3 KG/M2 | HEART RATE: 66 BPM | SYSTOLIC BLOOD PRESSURE: 134 MMHG | WEIGHT: 171.6 LBS | HEIGHT: 64 IN | DIASTOLIC BLOOD PRESSURE: 76 MMHG | OXYGEN SATURATION: 99 %

## 2023-11-27 DIAGNOSIS — J06.9 UPPER RESPIRATORY TRACT INFECTION, UNSPECIFIED TYPE: Primary | ICD-10-CM

## 2023-11-27 PROCEDURE — 99213 OFFICE O/P EST LOW 20 MIN: CPT | Performed by: INTERNAL MEDICINE

## 2023-11-27 RX ORDER — PREDNISONE 20 MG/1
40 TABLET ORAL DAILY
Qty: 10 TABLET | Refills: 0 | Status: SHIPPED | OUTPATIENT
Start: 2023-11-27 | End: 2023-12-02

## 2023-11-27 NOTE — PROGRESS NOTES
Assessment/Plan:    Problem List Items Addressed This Visit        Respiratory    Upper respiratory tract infection - Primary    Relevant Medications    predniSONE 20 mg tablet     Sinusitis, viral.  She probably has an element of sinusitis. She has some evidence of inflammation in her nostrils. I will prescribe Afrin nasal spray 2 sprays in each nostril twice a day for 5 days. I will prescribe prednisone 20 mg 2 tablets daily for the next 5 days. Follow-up  The patient will follow up in 06/2024. Chief Complaint    Care Gap Request; Sinus Problem; Cough; runny nose         Patient ID: Anthony Aldrich is a 72 y.o. female who presents for an acute visit. Her symptoms began almost 3 weeks ago. She started with a lot of sinus pressure over her maxillary sinuses, which was painful. The next day, she had a sore throat for a day due to postnasal drip. The top of her mouth gets a little swollen for a day. She has runny nose. She was taking Advil Cold and Sinus and Mucinex. She was drinking lots of water. She still has some postnasal drip and a cough. She experiences infrequent episode of coughing, primarily localized within the thoracic region. She continues to experience intermittent pressure in her maxillary sinuses. She denies any fever, chills, or sweats. She and her  tested positive for COVID-19 on 09/25/2023 after coming back from 3 weeks in [place]. She had no major symptoms with COVID-19. They both exhibited mild rhinorrhea and her , who typically does not experience headaches awoke with one. Consequently, he opted to undergo testing. She had taken prednisone about 15 years ago, but it makes her wired and she does not sleep. She has Flonase that she uses when she remembers. She is a [employer]ino[employer] and she have 2 rehearsals this week and 3 concerts coming up. She endeavors to limit her singing as excessive vocalization causes hoarseness. She is allergic to CODEINE.     She had her influenza vaccine in 11/2023. Objective:    /76 (BP Location: Left arm, Cuff Size: Large)   Pulse 66   Ht 5' 4" (1.626 m)   Wt 77.8 kg (171 lb 9.6 oz)   SpO2 99%   BMI 29.46 kg/m²     Wt Readings from Last 3 Encounters:   11/27/23 77.8 kg (171 lb 9.6 oz)   11/09/23 75.3 kg (166 lb)   05/30/23 77.8 kg (171 lb 9.6 oz)         Physical Exam  Constitutional:       General: She is not in acute distress. Appearance: Normal appearance. She is not ill-appearing. HENT:      Head:      Comments: Mild bilateral maxillary tenderness. Right Ear: Tympanic membrane normal.      Left Ear: Tympanic membrane normal.      Nose: Congestion (right nostril, no secretions in either nostril) present. Eyes:      General: No scleral icterus. Pulmonary:      Effort: Pulmonary effort is normal.      Breath sounds: Normal breath sounds. Neurological:      Mental Status: She is alert. Transcribed for Red Xiong MD, by Carli Morrow on 11/27/23 at 5:52 PM. Powered by [employer].

## 2024-01-18 DIAGNOSIS — J30.1 SEASONAL ALLERGIC RHINITIS DUE TO POLLEN: Chronic | ICD-10-CM

## 2024-01-18 DIAGNOSIS — I10 ESSENTIAL HYPERTENSION: ICD-10-CM

## 2024-01-18 RX ORDER — TRIAMTERENE AND HYDROCHLOROTHIAZIDE 37.5; 25 MG/1; MG/1
1 TABLET ORAL DAILY
Qty: 90 TABLET | Refills: 1 | Status: SHIPPED | OUTPATIENT
Start: 2024-01-18 | End: 2024-07-16

## 2024-01-18 RX ORDER — MONTELUKAST SODIUM 10 MG/1
10 TABLET ORAL DAILY
Qty: 90 TABLET | Refills: 1 | Status: SHIPPED | OUTPATIENT
Start: 2024-01-18 | End: 2024-07-16

## 2024-05-26 ENCOUNTER — RA CDI HCC (OUTPATIENT)
Dept: OTHER | Facility: HOSPITAL | Age: 74
End: 2024-05-26

## 2024-05-28 ENCOUNTER — APPOINTMENT (OUTPATIENT)
Dept: LAB | Facility: CLINIC | Age: 74
End: 2024-05-28
Payer: COMMERCIAL

## 2024-05-28 ENCOUNTER — OFFICE VISIT (OUTPATIENT)
Dept: FAMILY MEDICINE CLINIC | Facility: CLINIC | Age: 74
End: 2024-05-28
Payer: COMMERCIAL

## 2024-05-28 VITALS
TEMPERATURE: 98.6 F | HEIGHT: 64 IN | WEIGHT: 169.8 LBS | HEART RATE: 63 BPM | SYSTOLIC BLOOD PRESSURE: 102 MMHG | DIASTOLIC BLOOD PRESSURE: 68 MMHG | OXYGEN SATURATION: 98 % | BODY MASS INDEX: 28.99 KG/M2

## 2024-05-28 DIAGNOSIS — R50.9 FEVER, UNSPECIFIED FEVER CAUSE: ICD-10-CM

## 2024-05-28 DIAGNOSIS — R68.89 RIGORS: ICD-10-CM

## 2024-05-28 DIAGNOSIS — R68.89 RIGORS: Primary | ICD-10-CM

## 2024-05-28 PROBLEM — J06.9 UPPER RESPIRATORY TRACT INFECTION: Status: RESOLVED | Noted: 2023-11-27 | Resolved: 2024-05-28

## 2024-05-28 LAB
B BURGDOR IGG+IGM SER QL IA: NEGATIVE
BASOPHILS # BLD AUTO: 0.04 THOUSANDS/ÂΜL (ref 0–0.1)
BASOPHILS NFR BLD AUTO: 1 % (ref 0–1)
EOSINOPHIL # BLD AUTO: 0.04 THOUSAND/ÂΜL (ref 0–0.61)
EOSINOPHIL NFR BLD AUTO: 1 % (ref 0–6)
ERYTHROCYTE [DISTWIDTH] IN BLOOD BY AUTOMATED COUNT: 12.2 % (ref 11.6–15.1)
HCT VFR BLD AUTO: 43.5 % (ref 34.8–46.1)
HGB BLD-MCNC: 14.7 G/DL (ref 11.5–15.4)
IMM GRANULOCYTES # BLD AUTO: 0.03 THOUSAND/UL (ref 0–0.2)
IMM GRANULOCYTES NFR BLD AUTO: 0 % (ref 0–2)
LYMPHOCYTES # BLD AUTO: 1.14 THOUSANDS/ÂΜL (ref 0.6–4.47)
LYMPHOCYTES NFR BLD AUTO: 17 % (ref 14–44)
MCH RBC QN AUTO: 30.6 PG (ref 26.8–34.3)
MCHC RBC AUTO-ENTMCNC: 33.8 G/DL (ref 31.4–37.4)
MCV RBC AUTO: 91 FL (ref 82–98)
MONOCYTES # BLD AUTO: 0.85 THOUSAND/ÂΜL (ref 0.17–1.22)
MONOCYTES NFR BLD AUTO: 12 % (ref 4–12)
NEUTROPHILS # BLD AUTO: 4.81 THOUSANDS/ÂΜL (ref 1.85–7.62)
NEUTS SEG NFR BLD AUTO: 69 % (ref 43–75)
NRBC BLD AUTO-RTO: 0 /100 WBCS
PLATELET # BLD AUTO: 229 THOUSANDS/UL (ref 149–390)
PMV BLD AUTO: 11.2 FL (ref 8.9–12.7)
RBC # BLD AUTO: 4.8 MILLION/UL (ref 3.81–5.12)
WBC # BLD AUTO: 6.91 THOUSAND/UL (ref 4.31–10.16)

## 2024-05-28 PROCEDURE — G2211 COMPLEX E/M VISIT ADD ON: HCPCS | Performed by: INTERNAL MEDICINE

## 2024-05-28 PROCEDURE — 85025 COMPLETE CBC W/AUTO DIFF WBC: CPT

## 2024-05-28 PROCEDURE — 99213 OFFICE O/P EST LOW 20 MIN: CPT | Performed by: INTERNAL MEDICINE

## 2024-05-28 PROCEDURE — 87811 SARS-COV-2 COVID19 W/OPTIC: CPT | Performed by: INTERNAL MEDICINE

## 2024-05-28 PROCEDURE — 86618 LYME DISEASE ANTIBODY: CPT

## 2024-05-28 PROCEDURE — 36415 COLL VENOUS BLD VENIPUNCTURE: CPT

## 2024-05-28 RX ORDER — MAGNESIUM L-LACTATE 84 MG
84 TABLET, EXTENDED RELEASE ORAL DAILY
COMMUNITY
End: 2024-05-28

## 2024-05-28 NOTE — PROGRESS NOTES
Ambulatory Visit  Name: Astrid Scott      : 1950      MRN: [id number]  Encounter Provider: Zach Edouard MD  Encounter Date: 2024   Encounter department: [facility] PRIMARY CARE    Assessment & Plan  1.  Fever/rigors  Her rapid COVID-19 antigen is negative today.  Given that she is outdoors a lot and has been bitten by a lot of insects I think we should check her Lyme titer.  Will also order a CBC.  Recommended ibuprofen or Aleve for fevers or aches and pains.         History of Present Illness     History of Present Illness  The patient presents for evaluation of multiple medical concerns.    The patient returned from [place] approximately 2 weeks ago, during which she typically experiences mild constipation during travel. However, upon her return home, her constipation has not fully resolved. She initially experienced lethargy, bloating, and flatulence. Approximately 5 years ago, she suffered a hip fracture and underwent hip surgery. She suspects she strained a muscle in her groin due to persistent groin pain, which subsided after 3 days. She attempted to alleviate the pain by elevating and icing the affected area. Over the weekend, she began experiencing severe shaking chills, which she initially attributed to the application of ice. The following day, she developed a severe headache behind her eye, which was temporarily alleviated by Tylenol. She also reported generalized body aches, including her shoulders and neck. By  evening, she had no pain, but her symptoms persisted. Last night, she experienced a low-grade fever, which was alleviated by Tylenol and applied a cold cloth to her head. This morning, her  suggested a possible COVID-19 infection. She denies any respiratory symptoms such as nasal congestion, sore throat, or cough. She did not measure her temperature last night. She denies any dysuria or hematuria. She does not engage in outdoor activities such as  "gardening or walking in the forest. She denies any tick bites, but notes a history of insect bites.   Her paternal grandmother  of colon cancer.     Review of Systems  Objective     /68 (BP Location: Right arm, Patient Position: Sitting, Cuff Size: Standard)   Pulse 63   Temp 98.6 °F (37 °C)   Ht 5' 4\" (1.626 m)   Wt 77 kg (169 lb 12.8 oz)   SpO2 98%   BMI 29.15 kg/m²     Physical Exam    Physical Exam  Constitutional:       General: She is not in acute distress.     Appearance: Normal appearance. She is not ill-appearing.   HENT:      Right Ear: Tympanic membrane normal.      Left Ear: Tympanic membrane normal.      Mouth/Throat:      Mouth: Mucous membranes are moist.      Pharynx: Oropharynx is clear.   Eyes:      General: No scleral icterus.  Pulmonary:      Effort: Pulmonary effort is normal.      Breath sounds: Normal breath sounds.   Neurological:      Mental Status: She is alert.       Administrative Statements         "

## 2024-05-29 ENCOUNTER — TELEPHONE (OUTPATIENT)
Dept: FAMILY MEDICINE CLINIC | Facility: CLINIC | Age: 74
End: 2024-05-29

## 2024-05-29 ENCOUNTER — TELEPHONE (OUTPATIENT)
Age: 74
End: 2024-05-29

## 2024-05-29 NOTE — TELEPHONE ENCOUNTER
Made patient aware of results. Pt states she is not feeling well. States she believes she has a sinus infection. The pain she has in her head has moved down into head face and into her sinus cavity. States her mouth is swollen and that is a sign of a sinus infection for her. Asking for an antibiotic. Please advise.

## 2024-05-29 NOTE — TELEPHONE ENCOUNTER
Patient aware of lab results. She is concerned that her symptoms may be a sinus infection- headhache and pain in her sinuses, congestion, etc . She is asking for antibiotics for that.

## 2024-05-29 NOTE — TELEPHONE ENCOUNTER
Most sinus infections are viral so I would recommend Afrin nasal spray in each nostril twice a day for the next 5 days to help clear her sinuses.

## 2024-05-29 NOTE — TELEPHONE ENCOUNTER
----- Message from Zach Edouard MD sent at 5/29/2024  7:27 AM EDT -----  Please call the patient regarding lab results: Her Lyme test is negative and her CBC is normal without suggestion of infection.    How is she doing?

## 2024-05-30 ENCOUNTER — OFFICE VISIT (OUTPATIENT)
Dept: FAMILY MEDICINE CLINIC | Facility: CLINIC | Age: 74
End: 2024-05-30
Payer: COMMERCIAL

## 2024-05-30 VITALS
WEIGHT: 168.4 LBS | OXYGEN SATURATION: 97 % | SYSTOLIC BLOOD PRESSURE: 127 MMHG | DIASTOLIC BLOOD PRESSURE: 71 MMHG | HEART RATE: 71 BPM | BODY MASS INDEX: 28.75 KG/M2 | HEIGHT: 64 IN

## 2024-05-30 DIAGNOSIS — L03.114 CELLULITIS OF ARM, LEFT: Primary | ICD-10-CM

## 2024-05-30 LAB
SARS-COV-2 AG UPPER RESP QL IA: NEGATIVE
VALID CONTROL: NORMAL

## 2024-05-30 PROCEDURE — G2211 COMPLEX E/M VISIT ADD ON: HCPCS | Performed by: FAMILY MEDICINE

## 2024-05-30 PROCEDURE — 99213 OFFICE O/P EST LOW 20 MIN: CPT | Performed by: FAMILY MEDICINE

## 2024-05-30 RX ORDER — DOXYCYCLINE HYCLATE 100 MG/1
100 CAPSULE ORAL EVERY 12 HOURS SCHEDULED
Qty: 20 CAPSULE | Refills: 0 | Status: SHIPPED | OUTPATIENT
Start: 2024-05-30 | End: 2024-06-09

## 2024-05-30 NOTE — PROGRESS NOTES
"Assessment/Plan:       1. Cellulitis of arm, left  Comments:  We will check another Lyme titer in a month and start doxycycline  Orders:  -     Lyme Total AB W Reflex to IGM/IGG; Future  -     doxycycline hyclate (VIBRAMYCIN) 100 mg capsule; Take 1 capsule (100 mg total) by mouth every 12 (twelve) hours for 10 days        Subjective:      Patient ID: Astrid Scott is a 74 y.o. female.    The patient was seen 2 days ago with fever and rigors and chills.  She just returned from a trip to [place].  We were not sure if she had Lyme disease or tick exposure she had a negative Lyme test.  Now she has a bright red circular lesion with a bite site in the middle in her left axillary region more consistent with a spider bite.  She has some pain behind her right eye with some swelling around the eye but normal vision and no pain upon movement of the eyeball itself.  No fever currently.  No sore throat no cough no runny nose or stuffy nose or sinus symptoms.    Insect Bite        The following portions of the patient's history were reviewed and updated as appropriate: allergies, current medications, past family history, past medical history, past social history, past surgical history, and problem list.    Review of Systems   Respiratory: Negative.     Cardiovascular: Negative.          Objective:      /71 (BP Location: Right arm, Patient Position: Sitting, Cuff Size: Large)   Pulse 71   Ht 5' 4\" (1.626 m)   Wt 76.4 kg (168 lb 6.4 oz)   SpO2 97%   BMI 28.91 kg/m²          Physical Exam  Vitals and nursing note reviewed.   Constitutional:       Appearance: Normal appearance.   HENT:      Head: Normocephalic and atraumatic.      Nose: Nose normal.      Mouth/Throat:      Mouth: Mucous membranes are moist.   Eyes:      Extraocular Movements: Extraocular movements intact.      Pupils: Pupils are equal, round, and reactive to light.   Cardiovascular:      Rate and Rhythm: Normal rate and regular rhythm.      Pulses: " Normal pulses.   Pulmonary:      Effort: Pulmonary effort is normal.      Breath sounds: Normal breath sounds.   Abdominal:      General: Bowel sounds are normal.      Palpations: Abdomen is soft.   Musculoskeletal:      Cervical back: Normal range of motion.   Skin:     General: Skin is warm and dry.      Capillary Refill: Capillary refill takes less than 2 seconds.      Comments: In the left axilla there is a red warm circular lesion with a bite site in the center without streaking or lymphadenopathy   Neurological:      General: No focal deficit present.      Mental Status: She is alert.   Psychiatric:         Mood and Affect: Mood normal.

## 2024-06-03 ENCOUNTER — OFFICE VISIT (OUTPATIENT)
Dept: FAMILY MEDICINE CLINIC | Facility: CLINIC | Age: 74
End: 2024-06-03
Payer: COMMERCIAL

## 2024-06-03 VITALS
WEIGHT: 169.6 LBS | OXYGEN SATURATION: 98 % | DIASTOLIC BLOOD PRESSURE: 70 MMHG | HEIGHT: 64 IN | SYSTOLIC BLOOD PRESSURE: 124 MMHG | HEART RATE: 76 BPM | BODY MASS INDEX: 28.95 KG/M2

## 2024-06-03 DIAGNOSIS — R51.9 NEW ONSET HEADACHE: Primary | ICD-10-CM

## 2024-06-03 DIAGNOSIS — L03.114 CELLULITIS OF LEFT ARM: ICD-10-CM

## 2024-06-03 PROCEDURE — 99214 OFFICE O/P EST MOD 30 MIN: CPT | Performed by: INTERNAL MEDICINE

## 2024-06-03 PROCEDURE — G2211 COMPLEX E/M VISIT ADD ON: HCPCS | Performed by: INTERNAL MEDICINE

## 2024-06-03 RX ORDER — MAGNESIUM L-LACTATE 84 MG
84 TABLET, EXTENDED RELEASE ORAL DAILY
COMMUNITY

## 2024-06-03 RX ORDER — INDOMETHACIN 50 MG/1
50 CAPSULE ORAL 2 TIMES DAILY WITH MEALS
Qty: 30 CAPSULE | Refills: 1 | Status: SHIPPED | OUTPATIENT
Start: 2024-06-03

## 2024-06-03 NOTE — PROGRESS NOTES
Ambulatory Visit  Name: Astrid Scott      : 1950      MRN: [id number]  Encounter Provider: Zach Edouard MD  Encounter Date: 6/3/2024   Encounter department: [facility] PRIMARY C[facility]    Assessment & Plan  1.  New onset headache headache.  Etiology of the headache is unclear.  I think temporal arteritis is less likely.  I also don't think that this is an acute bacterial sinus infection.  A computed tomography (CT) scan of the brain will be ordered to further investigate the cause of her headache. The patient will be notified of the results upon their availability. A prescription for indomethacin 50 mg, to be taken thrice daily for a duration of 2 to 3 days, will be provided, with subsequent use as required. The patient has been advised to discontinue ibuprofen, but may take Tylenol if necessary.    2.  Left arm cellulitis.  The patient has been advised to complete her course of antibiotics as prescribed by Dr. Budinetz.         History of Present Illness     History of Present Illness  The patient is a 74-year-old female who presents for evaluation of multiple medical concerns.    The patient continues to experience discomfort, albeit with a slight improvement. She was evaluated by Dr. Budinetz on Thursday due to a non-pruritic rash localized left posterior proximal arm, which was suspected to be an ingrown hair by her  and daughter.  Neosporin had been ineffective. The following day, she noticed a significant increase in the size of the rash.  Dr. Budinetz felt that this was a cellulitis and prescribed doxycycline for 10 days.  She notes that the rash has improved significantly.  He also suggested that she get another Lyme titer as sometimes Lyme titers are negative early in the course of a Lyme infection.  The rash has since almost completely resolved. However, she continues to experience headaches localized behind her right eye. Despite feeling well on Saturday, she awoke yesterday  "morning with a headache that persisted throughout the day, disrupting her sleep. She attempted to manage her headaches with Advil and extra-strength Tylenol at 5:30 this morning, but these have not provided relief. Her son-in-law, a pediatric cardiologist in [place], recommended a head CT scan due to the persistent ocular pain over the weekend. She denies any changes in vision, unilateral weakness, numbness, or tingling. She also denies any jaw pain during mastication.    Supplemental Information  She had severe body aches and neck pain radiating from her shoulders, which have resolved by Saturday.   She is allergic to CODEINE.     Review of Systems  Objective     /70 (BP Location: Left arm, Patient Position: Sitting, Cuff Size: Standard)   Pulse 76   Ht 5' 4\" (1.626 m)   Wt 76.9 kg (169 lb 9.6 oz)   SpO2 98%   BMI 29.11 kg/m²     Physical Exam    Physical Exam  Constitutional:       General: She is not in acute distress.     Appearance: Normal appearance. She is not ill-appearing.   HENT:      Head:      Comments: No reproducible tenderness on palpation around the right orbit.  She does not have any temporal artery tenderness on the right.  Neurological:      Mental Status: She is alert.      Comments: Cranial nerves II-XII intact, motor was 5/5 in all major groups.       Administrative Statements         "

## 2024-06-10 ENCOUNTER — HOSPITAL ENCOUNTER (OUTPATIENT)
Dept: CT IMAGING | Facility: HOSPITAL | Age: 74
Discharge: HOME | End: 2024-06-10
Attending: INTERNAL MEDICINE
Payer: COMMERCIAL

## 2024-06-10 DIAGNOSIS — R51.9 NEW ONSET HEADACHE: ICD-10-CM

## 2024-06-10 PROCEDURE — 70450 CT HEAD/BRAIN W/O DYE: CPT

## 2024-06-13 ENCOUNTER — APPOINTMENT (OUTPATIENT)
Dept: LAB | Facility: CLINIC | Age: 74
End: 2024-06-13
Payer: COMMERCIAL

## 2024-06-13 DIAGNOSIS — L03.114 CELLULITIS OF ARM, LEFT: ICD-10-CM

## 2024-06-13 PROCEDURE — 86617 LYME DISEASE ANTIBODY: CPT

## 2024-06-13 PROCEDURE — 86618 LYME DISEASE ANTIBODY: CPT

## 2024-06-13 PROCEDURE — 36415 COLL VENOUS BLD VENIPUNCTURE: CPT

## 2024-06-14 DIAGNOSIS — L03.114 CELLULITIS OF ARM, LEFT: ICD-10-CM

## 2024-06-14 LAB
B BURGDOR IGG SERPL QL IA: POSITIVE
B BURGDOR IGG+IGM SER QL IA: POSITIVE
B BURGDOR IGM SERPL QL IA: POSITIVE

## 2024-06-14 RX ORDER — DOXYCYCLINE HYCLATE 100 MG/1
100 CAPSULE ORAL EVERY 12 HOURS SCHEDULED
Qty: 20 CAPSULE | Refills: 0 | Status: SHIPPED | OUTPATIENT
Start: 2024-06-14 | End: 2024-06-24

## 2024-06-17 ENCOUNTER — TELEPHONE (OUTPATIENT)
Dept: FAMILY MEDICINE CLINIC | Facility: CLINIC | Age: 74
End: 2024-06-17

## 2024-06-17 ENCOUNTER — OFFICE VISIT (OUTPATIENT)
Dept: FAMILY MEDICINE CLINIC | Facility: CLINIC | Age: 74
End: 2024-06-17
Payer: COMMERCIAL

## 2024-06-17 VITALS
DIASTOLIC BLOOD PRESSURE: 68 MMHG | HEIGHT: 64 IN | SYSTOLIC BLOOD PRESSURE: 132 MMHG | HEART RATE: 72 BPM | WEIGHT: 169.8 LBS | BODY MASS INDEX: 28.99 KG/M2 | OXYGEN SATURATION: 95 %

## 2024-06-17 DIAGNOSIS — Z78.0 POSTMENOPAUSAL: ICD-10-CM

## 2024-06-17 DIAGNOSIS — A69.20 LYME DISEASE: Primary | ICD-10-CM

## 2024-06-17 DIAGNOSIS — I10 ESSENTIAL HYPERTENSION: Chronic | ICD-10-CM

## 2024-06-17 PROCEDURE — G0439 PPPS, SUBSEQ VISIT: HCPCS | Performed by: INTERNAL MEDICINE

## 2024-06-17 PROCEDURE — 99214 OFFICE O/P EST MOD 30 MIN: CPT | Performed by: INTERNAL MEDICINE

## 2024-06-17 NOTE — PROGRESS NOTES
Ambulatory Visit  Name: Astrid Scott      : 1950      MRN: [id number]  Encounter Provider: Zach Edouard MD  Encounter Date: 2024   Encounter department: [facility] PRIMARY CARE    Assessment & Plan  Lyme disease  Will complete the remainder of her treatment with doxycycline.  I explained to her that she has evidence of an acute infection and does not need long-term antibiotics.  Essential hypertension  Good blood pressure control on current regimen.  Postmenopausal  Her last DEXA was in  so we will set her up for another DEXA.        Preventive health issues were discussed with patient, and age appropriate screening tests were ordered as noted in patient's After Visit Summary. Personalized health advice and appropriate referrals for health education or preventive services given if needed, as noted in patient's After Visit Summary.    History of Present Illness     She is here for her annual wellness visit.  She wanted to talk about her Lyme disease test.  She had a bull's-eye lesion in her left axilla a few weeks ago and her initial Lyme test was negative.  She was treated for Lyme with doxycycline for 1 week and it was renewed last week when her repeat Lyme titer came back positive both for IgM and IgG.  Wanted to know whether she needed long-term treatment because she knows someone who has been on long-term antibiotics for Lyme disease.  Her headache resolved.  She had her CT of the brain last week the results of which were pending during the visit (the CT was read just after she left the office visit and was negative for any intracranial lesion).        Medical History Reviewed by provider this encounter:       Annual Wellness Visit Questionnaire   Astrid is here for her Subsequent Wellness visit.     Health Risk Assessment:   Patient rates overall health as excellent. Patient feels that their physical health rating is same. Patient is very satisfied with their life. Eyesight  was rated as same. Hearing was rated as same. Patient feels that their emotional and mental health rating is same. Patients states they are never, rarely angry. Patient states they are never, rarely unusually tired/fatigued. Pain experienced in the last 7 days has been none. Patient states that she has experienced no weight loss or gain in last 6 months.     Depression Screening:   PHQ-2 Score: 0      Fall Risk Screening:   In the past year, patient has experienced: no history of falling in past year      Urinary Incontinence Screening:   Patient has not leaked urine accidently in the last six months.     Home Safety:  Patient does not have trouble with stairs inside or outside of their home. Patient has working smoke alarms and has working carbon monoxide detector. Home safety hazards include: none.     Nutrition:   Current diet is Regular.     Medications:   Patient is currently taking over-the-counter supplements. OTC medications include: see medication list. Patient is able to manage medications.     Activities of Daily Living (ADLs)/Instrumental Activities of Daily Living (IADLs):   Walk and transfer into and out of bed and chair?: Yes  Dress and groom yourself?: Yes    Bathe or shower yourself?: Yes    Feed yourself? Yes  Do your laundry/housekeeping?: Yes  Manage your money, pay your bills and track your expenses?: Yes  Make your own meals?: Yes    Do your own shopping?: Yes    Previous Hospitalizations:   Any hospitalizations or ED visits within the last 12 months?: No      Advance Care Planning:   Living will: No    Durable POA for healthcare: No    Advanced directive: No    Advanced directive counseling given: Yes    ACP document given: Yes    End of Life Decisions reviewed with patient: Yes      Comments: Astrid chooses comfort care measures in the event of a terminal diagnosis.       Cognitive Screening:   Provider or family/friend/caregiver concerned regarding cognition?: No    PREVENTIVE  SCREENINGS      Cardiovascular Screening:    General: Screening Current      Diabetes Screening:     General: Screening Current      Colorectal Cancer Screening:     General: Screening Current      Breast Cancer Screening:     General: Screening Current      Cervical Cancer Screening:    General: Screening Not Indicated      Osteoporosis Screening:      Due for: DXA Axial and DXA Appendicular      Abdominal Aortic Aneurysm (AAA) Screening:        General: Screening Not Indicated      Lung Cancer Screening:     General: Screening Not Indicated      Hepatitis C Screening:    General: Screening Current    Screening, Brief Intervention, and Referral to Treatment (SBIRT)    Screening  Typical number of drinks in a day: 1  Typical number of drinks in a week: 3  Interpretation: Low risk drinking behavior.    AUDIT-C Screenin) How often did you have a drink containing alcohol in the past year? 2 to 4 times a month  2) How many drinks did you have on a typical day when you were drinking in the past year? 0  3) How often did you have 6 or more drinks on one occasion in the past year? never    AUDIT-C Score: 2  Interpretation: Score 0-2 (female): Negative screen for alcohol misuse    Single Item Drug Screening:  How often have you used an illegal drug (including marijuana) or a prescription medication for non-medical reasons in the past year? never    Single Item Drug Screen Score: 0  Interpretation: Negative screen for possible drug use disorder    Social Determinants of Health     Financial Resource Strain: Low Risk  (2023)    Overall Financial Resource Strain (CARDIA)     Difficulty of Paying Living Expenses: Not hard at all   Food Insecurity: No Food Insecurity (6/10/2024)    Hunger Vital Sign     Worried About Running Out of Food in the Last Year: Never true     Ran Out of Food in the Last Year: Never true   Transportation Needs: No Transportation Needs (6/10/2024)    PRAPARE - Transportation     Lack of  "Transportation (Medical): No     Lack of Transportation (Non-Medical): No   Housing Stability: Low Risk  (6/10/2024)    Housing Stability Vital Sign     Unable to Pay for Housing in the Last Year: No     Number of Times Moved in the Last Year: 0     Homeless in the Last Year: No   Utilities: Not At Risk (6/10/2024)    [facility] Utilities     Threatened with loss of utilities: No     No results found.    Objective     /68 (BP Location: Right arm, Patient Position: Sitting, Cuff Size: Standard)   Pulse 72   Ht 5' 4\" (1.626 m)   Wt 77 kg (169 lb 12.8 oz)   SpO2 95%   BMI 29.15 kg/m²     Physical Exam  Constitutional:       General: She is not in acute distress.     Appearance: Normal appearance. She is well-developed. She is not ill-appearing.   Neck:      Vascular: No JVD.   Cardiovascular:      Rate and Rhythm: Normal rate and regular rhythm.      Heart sounds: Normal heart sounds. No murmur heard.     No friction rub. No gallop.   Pulmonary:      Breath sounds: Normal breath sounds.   Abdominal:      General: Bowel sounds are normal. There is no distension.      Palpations: Abdomen is soft. There is no mass.   Musculoskeletal:         General: No swelling.   Skin:     Comments: The bull's-eye rash has resolved.  She has a 1 cm patch of reddish-brown left axilla.   Neurological:      Mental Status: She is alert.       Administrative Statements           "

## 2024-06-17 NOTE — TELEPHONE ENCOUNTER
----- Message from Robert Budinetz, MD sent at 6/14/2024 12:35 PM EDT -----  The lyme test is positive now  I sent in an extra 10 days of doxycycline

## 2024-07-15 DIAGNOSIS — E78.49 OTHER HYPERLIPIDEMIA: Primary | ICD-10-CM

## 2024-07-15 DIAGNOSIS — J30.1 SEASONAL ALLERGIC RHINITIS DUE TO POLLEN: Chronic | ICD-10-CM

## 2024-07-15 DIAGNOSIS — I10 ESSENTIAL HYPERTENSION: ICD-10-CM

## 2024-07-15 RX ORDER — MONTELUKAST SODIUM 10 MG/1
10 TABLET ORAL DAILY
Qty: 100 TABLET | Refills: 1 | Status: SHIPPED | OUTPATIENT
Start: 2024-07-15

## 2024-07-15 RX ORDER — TRIAMTERENE AND HYDROCHLOROTHIAZIDE 37.5; 25 MG/1; MG/1
1 TABLET ORAL DAILY
Qty: 30 TABLET | Refills: 0 | Status: SHIPPED | OUTPATIENT
Start: 2024-07-15

## 2024-08-05 DIAGNOSIS — I10 ESSENTIAL HYPERTENSION: ICD-10-CM

## 2024-08-05 RX ORDER — TRIAMTERENE AND HYDROCHLOROTHIAZIDE 37.5; 25 MG/1; MG/1
1 TABLET ORAL DAILY
Qty: 90 TABLET | Refills: 0 | Status: SHIPPED | OUTPATIENT
Start: 2024-08-05

## 2024-08-07 ENCOUNTER — OFFICE VISIT (OUTPATIENT)
Dept: OBGYN CLINIC | Facility: CLINIC | Age: 74
End: 2024-08-07
Payer: COMMERCIAL

## 2024-08-07 ENCOUNTER — HOSPITAL ENCOUNTER (OUTPATIENT)
Dept: RADIOLOGY | Facility: CLINIC | Age: 74
Discharge: HOME | End: 2024-08-07
Payer: COMMERCIAL

## 2024-08-07 VITALS
HEIGHT: 64 IN | TEMPERATURE: 97.3 F | WEIGHT: 172 LBS | DIASTOLIC BLOOD PRESSURE: 74 MMHG | SYSTOLIC BLOOD PRESSURE: 130 MMHG | OXYGEN SATURATION: 96 % | HEART RATE: 71 BPM | BODY MASS INDEX: 29.37 KG/M2

## 2024-08-07 DIAGNOSIS — M25.511 ACUTE PAIN OF RIGHT SHOULDER: ICD-10-CM

## 2024-08-07 DIAGNOSIS — M75.81 TENDINITIS OF RIGHT ROTATOR CUFF: Primary | ICD-10-CM

## 2024-08-07 PROCEDURE — 73030 X-RAY EXAM OF SHOULDER: CPT

## 2024-08-07 PROCEDURE — 99214 OFFICE O/P EST MOD 30 MIN: CPT | Performed by: ORTHOPAEDIC SURGERY

## 2024-08-07 NOTE — PROGRESS NOTES
ASSESSMENT/PLAN:    Diagnoses and all orders for this visit:    Tendinitis of right rotator cuff  -     Ambulatory Referral to Physical Therapy; Future    Acute pain of right shoulder  -     XR shoulder 2+ vw right; Future      Reviewed with patient at time of visit that physical exam and imaging are consistent with rotator cuff tendonitis. Discussed various treatment options including physical therapy and cortisone injection. Patient elected to begin physical therapy and an order was placed. She will be taking an extended vacation so she was advised to attended a session or two to acquire a home exercise program she may maintain. She will call the office upon her return to follow up for her right shoulder and/or her annual hip follow up.      Return in about 1 month (around 9/7/2024).      _____________________________________________________  CHIEF COMPLAINT:  No chief complaint on file.        SUBJECTIVE:  Astrid Scott is a 74 y.o. year old female who presents with right shoulder pain. Patient states she has been experiencing the pain/ache for about 2 weeks. The pain started in her shoulder and travels down her arm to her elbow. She feels the most pain in her lateral shoulder region when she lifts her arm to the side into abduction. She states she has occasional tingling in the shoudler.  She denies and injury to the arm or neck. He shares that she had a tick bite in May and was treated for Lyme Disease.     PAST MEDICAL HISTORY:  Past Medical History:   Diagnosis Date    Allergic     SeAsonal    Essential hypertension     GERD (gastroesophageal reflux disease)     Hyperlipidemia     Hypertension     Skin cancer     1991       PAST SURGICAL HISTORY:  Past Surgical History:   Procedure Laterality Date    BASAL CELL CARCINOMA EXCISION      BREAST EXCISIONAL BIOPSY Left     Benign- 1991    BREAST SURGERY  1993    Breast biopsy    FRACTURE SURGERY  12/9/2019    Partial hip replacement    HERNIA REPAIR      HIP  SURGERY      RI HEMIARTHROPLASTY HIP PARTIAL Right 12/09/2019    Procedure: HEMIARTHROPLASTY HIP (BIPOLAR);  Surgeon: Jun Gomez;  Location: [facility];  Service: Orthopedics       FAMILY HISTORY:  Family History   Problem Relation Age of Onset    Colon cancer Paternal Grandmother     Diabetes Mother     Hypertension Mother     Coronary artery disease Mother     Heart disease Mother     Coronary artery disease Father     Hearing loss Father     Vision loss Father     Completed Suicide  Father     No Known Problems Sister     No Known Problems Daughter     Stroke Maternal Grandmother     No Known Problems Maternal Grandfather     Lung cancer Paternal Grandfather     Cancer Paternal Grandfather     No Known Problems Daughter     Lung cancer Paternal Aunt     No Known Problems Paternal Aunt        SOCIAL HISTORY:  Social History     Tobacco Use    Smoking status: Never     Passive exposure: Never    Smokeless tobacco: Never   Vaping Use    Vaping status: Never Used   Substance Use Topics    Alcohol use: Yes     Comment: Occasionally     Drug use: Never       MEDICATIONS:    Current Outpatient Medications:     Ascorbic Acid (vitamin C) 100 MG tablet, Take 100 mg by mouth daily, Disp: , Rfl:     calcium carbonate-vitamin D (OSCAL-D) 500 mg-200 units per tablet, Take 1 tablet by mouth daily, Disp: , Rfl:     magnesium (MAGTAB) 84 MG (7MEQ) TBCR, Take 84 mg by mouth daily, Disp: , Rfl:     Misc Natural Products (GLUCOSAMINE CHOND COMPLEX/MSM PO), Take 1,500 mg by mouth, Disp: , Rfl:     montelukast (SINGULAIR) 10 mg tablet, TAKE 1 TABLET BY MOUTH EVERY DAY, Disp: 100 tablet, Rfl: 1    omeprazole (PriLOSEC) 20 mg delayed release capsule, Take 20 mg by mouth as needed, Disp: , Rfl:     triamterene-hydrochlorothiazide (MAXZIDE-25) 37.5-25 mg per tablet, TAKE 1 TABLET BY MOUTH EVERY DAY, Disp: 90 tablet, Rfl: 0    Turmeric 500 MG CAPS, Take by mouth, Disp: , Rfl:     vitamin B-12 (VITAMIN B-12) 500 mcg tablet, Take 500 mcg  "by mouth daily, Disp: , Rfl:     ALLERGIES:  Allergies   Allergen Reactions    Codeine Hives       Review of systems:   Constitutional: Negative for fatigue, fever or loss of apetite.   HENT: Negative.    Respiratory: Negative for shortness of breath, dyspnea.    Cardiovascular: Negative for chest pain/tightness.   Gastrointestinal: Negative for abdominal pain, N/V.   Endocrine: Negative for cold/heat intolerance, unexplained weight loss/gain.   Genitourinary: Negative for flank pain, dysuria, hematuria.   Musculoskeletal: As noted in HPI   Skin: Negative for rash.    Neurological: Negative  Psychiatric/Behavioral: Negative for agitation.  _____________________________________________________  PHYSICAL EXAMINATION:    Blood pressure 130/74, pulse 71, temperature (!) 97.3 °F (36.3 °C), height 5' 4\" (1.626 m), weight 78 kg (172 lb), SpO2 96%.    General: well developed and well nourished, alert, oriented times 3, and appears comfortable  Psychiatric: Normal  HEENT: Benign  Cardiovascular: Regular    Pulmonary: No wheezing or stridor  Abdomen: Soft, Nontender  Skin: No masses, erythema, lacerations, fluctation, ulcerations  Neurovascular: Sensory and motor grossly intact    MUSCULOSKELETAL EXAMINATION:    right Shoulder -   No anatomical deformity  Skin is warm and dry to touch with no signs of erythema, ecchymosis, or infection  No soft tissue swelling or effusion noted  No Palpable crepitus with passive motion  No Tenderness to palpateROM °, ° pain at midrange, ER 90° with pain   Strength: 4/5 throughout. Pain with resisted IR  No glenohumeral instability appreciated on exam   - O'jorge's, - empty can, - drop-arm, - resisted external rotation, - belly press, - lift-off  Demonstrates normal elbow, wrist, and finger motion  2+  distal radial pulse with brisk capillary refill to the fingers  Radial, median, ulnar and motor  and sensory distribution intact  Sensation to light touch intact " distally      _____________________________________________________  STUDIES REVIEWED:  X-Ray of right shoulder obtained on 8/7/24 were reviewed and demonstrate  no acute osseous abnormalities or fracture .          Scribe Attestation      I,:  Alejandra Kaur am acting as a scribe while in the presence of the attending physician.:       I,:  Jun Gomez,  personally performed the services described in this documentation    as scribed in my presence.:

## 2024-08-12 ENCOUNTER — EVALUATION (OUTPATIENT)
Dept: PHYSICAL THERAPY | Facility: CLINIC | Age: 74
End: 2024-08-12
Payer: COMMERCIAL

## 2024-08-12 DIAGNOSIS — M75.81 TENDINITIS OF RIGHT ROTATOR CUFF: ICD-10-CM

## 2024-08-12 PROCEDURE — 97110 THERAPEUTIC EXERCISES: CPT | Performed by: PHYSICAL THERAPIST

## 2024-08-12 PROCEDURE — 97140 MANUAL THERAPY 1/> REGIONS: CPT | Performed by: PHYSICAL THERAPIST

## 2024-08-12 PROCEDURE — 97161 PT EVAL LOW COMPLEX 20 MIN: CPT | Performed by: PHYSICAL THERAPIST

## 2024-08-12 NOTE — PROGRESS NOTES
PT Evaluation     Today's date: 2024  Patient name: Astrid Scott  : 1950  MRN: [id number]  Referring provider: Jun Gomez DO  Dx:   Encounter Diagnosis     ICD-10-CM    1. Tendinitis of right rotator cuff  M75.81 Ambulatory Referral to Physical Therapy                     Assessment  Symptom irritability: moderate    Assessment details: Pt presents to PT with acute onset of R shoulder pain. No LUCERO. Signs and symptoms appear RTC impairment driven. Functionally has pain with reaching to side, donning shirts, pulling up pants. Objectively she has good resistance with ER but impaired active mobility flex/abd, pain and weakness with same motions and pain with IR. Skilled PT warranted to address findings and progress towards outlined goals. Pt in agreement with current POC.    Understanding of Dx/Px/POC: good     Prognosis: good    Goals  St week  Patient will be independent with home program focused on pain control and motion as she is leaving for longer vacation    LT-12 weeks (due to vacation)  Active ROM within 90% L compared to R with minimal pain  Independent with RTC and scapular strength program  Minimal to no pain with donning shirt and pants normally  Minimal pain with reaching to side  Er and IR MMT 4+/5 and flex MMT 4/5 or greater    Plan  Patient would benefit from: skilled physical therapy  Planned modality interventions: cryotherapy and thermotherapy: hydrocollator packs    Planned therapy interventions: manual therapy, neuromuscular re-education, self care, therapeutic activities, therapeutic exercise and home exercise program    Frequency: 2x week  Duration in weeks: 12  Plan of Care beginning date: 2024  Plan of Care expiration date: 2024  Treatment plan discussed with: patient  Plan details: TE, NMR, TA, MT, self education, and modalities as needed in order to progress through skilled PT focused on  ROM, strength, balance, coordination             Subjective  Evaluation    History of Present Illness  Mechanism of injury: 74 years old female presenting to PT with 2 week onset of R mid arm pain that at times goes into elbow or into superior scapular region. Denies any mechanism of injury, just woke up one day with the achiness. Denies sharp/stabbing pain. Saw ortho last week, x-rays negative. She is RHD.    Has discomfort with pulling up pants, getting into shirt, reaching out to side. Sleeping on right side is uncomfortable (also has hx of hemiarthroplasty R hip).   Patient Goals  Patient goals for therapy: decreased pain, increased motion, increased strength, independence with ADLs/IADLs and return to sport/leisure activities    Pain  At worst pain ratin          Objective  Observation/posture  unremarkable      Cervical screen  Limited ROT symmetrically, no pain reproduced  Quadrant (-)        Shoulder AROM R/L  Flex compensated 150/160  Abd 90 with pain/160  ER 75 with pain/75  IR - reach BB: R pain and compensation to L5/ left L1        Shoulder PROM R/L  Flex 160/160  Abd 160/160  Er 85/85  Ir 25 w/ pain/50      MMT R/L  Flex: 3/5  Abd: NT  ER: 4/4+  IR: 4*/5      Palpation  LHB, supra , post RTC all tender      Special tests  held         Precautions: acute R shld pain    Daily Treatment Diary:      Initial Evaluation Date: 24  POC Expiration: 24  Compliance 24                     Visit Number 1                    Re-Eval  IE                    Foto Captured Y                          Manuals         Joint work Gr 1-2 post mob         ST Post RTC (improved IR to 40 deg)        flexibility                  Neuro Re-Ed         tband ext         tband rows                  Rhythmic stability                                    Ther Ex         UBE/pulleys         Stick flex x20        Stick ER x20        Prayer stretch         S/l abd x20                 Supine scap retraction 2-way         Prone rows         Prone ext         Prone horiz abd                   bicep         tricep         education Reviewed above for HEP        Ther Activity                           Modalities

## 2024-08-14 ENCOUNTER — OFFICE VISIT (OUTPATIENT)
Dept: PHYSICAL THERAPY | Facility: CLINIC | Age: 74
End: 2024-08-14
Payer: COMMERCIAL

## 2024-08-14 DIAGNOSIS — M75.81 TENDINITIS OF RIGHT ROTATOR CUFF: Primary | ICD-10-CM

## 2024-08-14 PROCEDURE — 97140 MANUAL THERAPY 1/> REGIONS: CPT | Performed by: PHYSICAL THERAPIST

## 2024-08-14 PROCEDURE — 97110 THERAPEUTIC EXERCISES: CPT | Performed by: PHYSICAL THERAPIST

## 2024-08-14 NOTE — PROGRESS NOTES
Daily Note     Today's date: 2024  Patient name: Astrid Scott  : 1950  MRN: [id number]  Referring provider: Jun Gomez DO  Dx:   Encounter Diagnosis     ICD-10-CM    1. Tendinitis of right rotator cuff  M75.81           Start Time: 915  Stop Time: 1000  Total time in clinic (min): 45 minutes    Patient treated by BERNICE Boucher under the direct supervision of Shiva Melara, PT, DPT.     Subjective: notes that her shoulder was feeling good following IE. Felt good this morning when she woke up, sore upon arrival to appointment attributes this to doing her hair. Leaving on  for a month long trip.       Objective: See treatment diary below      Assessment: Patient showed improvements in IR following IE, having less pain with the motions. She is able to work into more motion as the session progresses. Remains with pain we shoulder ABD/IR/ER at end range. Tolerated treatment well. Patient exhibited good technique with therapeutic exercises and would benefit from continued PT      Plan: Continue per plan of care.      Precautions: acute R shld pain    Daily Treatment Diary:      Initial Evaluation Date: 24  POC Expiration: 24  Compliance 24                   Visit Number 1 2                   Re-Eval  IE                 MC   Foto Captured Y                          Manuals        Joint work Gr 1-2 post mob  Gr 1-2 post mob       ST Post RTC (improved IR to 40 deg) Post RTC       flexibility                  Neuro Re-Ed         tband ext         tband rows                  Rhythmic stability                                    Ther Ex         UBE/pulleys  4'       Stick flex x20 x20       Stick ER x20 x20       S/l ER  2x12       S/l abd x20 x20                Supine scap retraction 2-way         Prone rows         Prone ext         Prone horiz abd                  bicep         tricep         education Reviewed above for HEP        Ther Activity                            Modalities                              Access Code: [email or web address]  URL: [email or web address]  Date: 08/14/2024  Prepared by: Shiva Melara    Exercises  - Sidelying Shoulder External Rotation  - 1 x daily - 7 x weekly - 3 sets - 10 reps  - Sidelying Shoulder Abduction Palm Forward  - 1 x daily - 7 x weekly - 3 sets - 10 reps  - Supine Shoulder Flexion Extension AAROM with Dowel  - 1 x daily - 7 x weekly - 3 sets - 10 reps  - Supine Shoulder External Rotation with Dowel  - 1 x daily - 7 x weekly - 8 reps - 10 hold  - Standing Shoulder Row with Anchored Resistance  - 1 x daily - 7 x weekly - 3 sets - 10 reps

## 2024-08-19 ENCOUNTER — TELEPHONE (OUTPATIENT)
Dept: GYNECOLOGY | Facility: CLINIC | Age: 74
End: 2024-08-19

## 2024-08-19 NOTE — TELEPHONE ENCOUNTER
LM relaying msg that Dr. PEÑA will not be able to see pts at [facility] for the time being that we will need to either schedule her here in [place] or if she wants to see another provider to let us know.

## 2024-09-17 ENCOUNTER — OFFICE VISIT (OUTPATIENT)
Dept: PHYSICAL THERAPY | Facility: CLINIC | Age: 74
End: 2024-09-17
Payer: COMMERCIAL

## 2024-09-17 DIAGNOSIS — M75.81 TENDINITIS OF RIGHT ROTATOR CUFF: Primary | ICD-10-CM

## 2024-09-17 PROCEDURE — 97110 THERAPEUTIC EXERCISES: CPT | Performed by: PHYSICAL THERAPIST

## 2024-09-17 PROCEDURE — 97140 MANUAL THERAPY 1/> REGIONS: CPT | Performed by: PHYSICAL THERAPIST

## 2024-09-17 NOTE — PROGRESS NOTES
PT Re-Evaluation     Today's date: 2024  Patient name: Astrid Scott  : 1950  MRN: [id number]  Referring provider: Jun Gomez DO  Dx:   Encounter Diagnosis     ICD-10-CM    1. Tendinitis of right rotator cuff  M75.81                        Assessment  Symptom irritability: moderate    Assessment details: Pt was away on month long vacation. Her active abduction range is better but remains painful. Symptoms present mostly post arm but are not always consistent and can be vague. Symptoms reproduced with IR and reduced with post glide suggesting internal impingement potential. Feel RTC remains sympom . Will benefit from continued skilled PT to address findings, promote strength, pain free ROM, and improved function. Advised to monitor fullness type feeling in axila and discuss with PCP - does have yearly imaging scheduled for Oct/Nov.    Pt in agreement with POC.    Understanding of Dx/Px/POC: good     Prognosis: good    Goals  St week  Patient will be independent with home program focused on pain control and motion as she is leaving for longer vacation    LT-12 weeks (due to vacation) - not met  Active ROM within 90% L compared to R with minimal pain  Independent with RTC and scapular strength program  Minimal to no pain with donning shirt and pants normally  Minimal pain with reaching to side  Er and IR MMT 4+/5 and flex MMT 4/5 or greater    Plan  Patient would benefit from: skilled physical therapy  Planned modality interventions: cryotherapy and thermotherapy: hydrocollator packs    Planned therapy interventions: manual therapy, neuromuscular re-education, self care, therapeutic activities, therapeutic exercise and home exercise program    Frequency: 2x week  Duration in weeks: 12  Plan of Care beginning date: 2024  Plan of Care expiration date: 2024  Treatment plan discussed with: patient  Plan details: TE, NMR, TA, MT, self education, and modalities as needed in order  to progress through skilled PT focused on  ROM, strength, balance, coordination             Subjective Evaluation    History of Present Illness  Mechanism of injury: 74 years old female presenting to PT with 2 week onset of R mid arm pain that at times goes into elbow or into superior scapular region. Denies any mechanism of injury, just woke up one day with the achiness. Denies sharp/stabbing pain. Saw ortho last week, x-rays negative. She is RHD.    Has discomfort with pulling up pants, getting into shirt, reaching out to side. Sleeping on right side is uncomfortable (also has hx of hemiarthroplasty R hip).     Update : patient was away for last month. Notes arm is not better or worse.   Patient Goals  Patient goals for therapy: decreased pain, increased motion, increased strength, independence with ADLs/IADLs and return to sport/leisure activities    Pain  At worst pain ratin          Objective  Observation/posture  unremarkable      Cervical screen  Limited ROT symmetrically, no pain reproduced  Quadrant (-)        Shoulder AROM R/L  Flex compensated 150/160  Abd 150 with pain/160  ER 75 with pain end-range/75  IR - reach BB: R pain and compensation to L5/ left L1        Shoulder PROM R/L  Flex 160 pain end-range/160  Abd 160 pain end-range/160  Er 85/85  Ir 40 w/ pain/50    **pain with passive IR reduces with post GH glide      MMT R/L  Flex: 3+/5  Abd: 3*/5  ER: 4/4+  IR: 4*/5      Palpation  LHB, supra , post RTC all tender  Subscap and pec minor all tender      Special tests  Empty can (+)         Precautions: acute R shld pain    Daily Treatment Diary:         nitial Evaluation Date: 24  POC Expiration: 24  Compliance 24                 Visit Number 1 2  3                 Re-Eval  IE    RE              MC   Foto Captured Y                              Manuals          Joint work Gr 1-2 post mob  Gr 1-2 post mob  gr 2-3 post mob         ST Post RTC  (improved IR to 40 deg) Post RTC  post RTC and proximal tricep         flexibility      assessment                         Neuro Re-Ed               tband ext               tband rows                               Rhythmic stability                                                               Ther Ex               UBE/pulleys   4'  3'         Stick flex x20 x20           Stick ER x20 x20           S/l ER   2x12           S/l abd x20 x20            tband rows      ashok tb 2x15         Tband er   Rtb 2x10      Tband ir   Rtb 2x10      Tband bicep curls   2x10 ashok tb      Supine scap retraction 2-way               Prone rows               Prone ext               Prone horiz abd                               bicep               tricep               education Reviewed above for HEP             Ther Activity                                               Modalities                                                  Access Code: [email or web address]  URL: [email or web address]  Date: 08/14/2024  Prepared by: Shiva Melara     Exercises  - Sidelying Shoulder External Rotation  - 1 x daily - 7 x weekly - 3 sets - 10 reps  - Sidelying Shoulder Abduction Palm Forward  - 1 x daily - 7 x weekly - 3 sets - 10 reps  - Supine Shoulder Flexion Extension AAROM with Dowel  - 1 x daily - 7 x weekly - 3 sets - 10 reps  - Supine Shoulder External Rotation with Dowel  - 1 x daily - 7 x weekly - 8 reps - 10 hold  - Standing Shoulder Row with Anchored Resistance  - 1 x daily - 7 x weekly - 3 sets - 10 reps

## 2024-09-19 ENCOUNTER — OFFICE VISIT (OUTPATIENT)
Dept: PHYSICAL THERAPY | Facility: CLINIC | Age: 74
End: 2024-09-19
Payer: COMMERCIAL

## 2024-09-19 DIAGNOSIS — M75.81 TENDINITIS OF RIGHT ROTATOR CUFF: Primary | ICD-10-CM

## 2024-09-19 PROCEDURE — 97110 THERAPEUTIC EXERCISES: CPT | Performed by: PHYSICAL THERAPIST

## 2024-09-19 PROCEDURE — 97140 MANUAL THERAPY 1/> REGIONS: CPT | Performed by: PHYSICAL THERAPIST

## 2024-09-19 NOTE — PROGRESS NOTES
"Daily Note     Today's date: 2024  Patient name: Astrid Scott  : 1950  MRN: [id number]  Referring provider: Jun Gomez DO  Dx:   Encounter Diagnosis     ICD-10-CM    1. Tendinitis of right rotator cuff  M75.81                      Subjective: felt pretty good for day after ie but today soreness along side, near shoulder blade      Objective: See treatment diary below      Assessment: tenderness along subscap which is \"patient's pain\". After ST had no pain with resisted IR. Passive IR improved today and passively only terminal flex/abd. Increased light strengthening and Tolerated treatment well. Patient would benefit from continued PT      Plan: Continue per plan of care.      Precautions: acute R shld pain    Daily Treatment Diary:        nitial Evaluation Date: 24  POC Expiration: 24  Compliance 24               Visit Number 1 2  3  4               Re-Eval  IE    RE              MC   Foto Captured Y                              Manuals        Joint work Gr 1-2 post mob  Gr 1-2 post mob  gr 2-3 post mob  gr 1-2 inf and post mob       ST Post RTC (improved IR to 40 deg) Post RTC  post RTC and proximal tricep  subscap TpR       flexibility      assessment                         Neuro Re-Ed               tband ext               tband rows                               Rhythmic stability                                                               Ther Ex               UBE/pulleys   4'  3'  4'       Stick flex x20 x20           Stick ER x20 x20           S/l ER   2x12           S/l abd x20 x20            tband rows      ashok tb 2x15  ashok tb 2x15       Tband er     Rtb 2x10  rtb 3x10-12       Tband ir     Rtb 2x10  rtb 3x10-12       Tband bicep curls     2x10 ashok tb  4# db 3x10       Standing full cans    1# 3x8     Tricep press down    Ashok tb 3x10     Supine scap retraction 2-way               Prone rows               Prone ext             "   Prone horiz abd                               bicep               tricep               education Reviewed above for HEP             Ther Activity                                               Modalities                                                  Access Code: [email or web address]  URL: [email or web address]  Date: 08/14/2024  Prepared by: Shiva Melara     Exercises  - Sidelying Shoulder External Rotation  - 1 x daily - 7 x weekly - 3 sets - 10 reps  - Sidelying Shoulder Abduction Palm Forward  - 1 x daily - 7 x weekly - 3 sets - 10 reps  - Supine Shoulder Flexion Extension AAROM with Dowel  - 1 x daily - 7 x weekly - 3 sets - 10 reps  - Supine Shoulder External Rotation with Dowel  - 1 x daily - 7 x weekly - 8 reps - 10 hold  - Standing Shoulder Row with Anchored Resistance  - 1 x daily - 7 x weekly - 3 sets - 10 reps

## 2024-09-23 ENCOUNTER — OFFICE VISIT (OUTPATIENT)
Dept: PHYSICAL THERAPY | Facility: CLINIC | Age: 74
End: 2024-09-23
Payer: COMMERCIAL

## 2024-09-23 DIAGNOSIS — M75.81 TENDINITIS OF RIGHT ROTATOR CUFF: Primary | ICD-10-CM

## 2024-09-23 PROCEDURE — 97110 THERAPEUTIC EXERCISES: CPT

## 2024-09-23 PROCEDURE — 97140 MANUAL THERAPY 1/> REGIONS: CPT

## 2024-09-23 NOTE — PROGRESS NOTES
Daily Note     Today's date: 2024  Patient name: Astrid Scott  : 1950  MRN: [id number]  Referring provider: Jun Gomez DO  Dx:   Encounter Diagnosis     ICD-10-CM    1. Tendinitis of right rotator cuff  M75.81                      Subjective: Patient reports shoulder is pretty sore today.       Objective: See treatment diary below      Assessment: Astrid Scott tolerated treatment well. Good relief noted post soft tissue massage. ER limited with tender end feel. Continued treatment should benefit.        Plan: Continue per plan of care.      Precautions: acute R shld pain    Daily Treatment Diary:        nitial Evaluation Date: 24  POC Expiration: 24  Compliance 24             Visit Number 1 2  3  4  5             Re-Eval  IE    RE              MC   Foto Captured Y                              Manuals      Joint work Gr 1-2 post mob  Gr 1-2 post mob  gr 2-3 post mob  gr 1-2 inf and post mob  gr 1-2 inf and post mob     ST Post RTC (improved IR to 40 deg) Post RTC  post RTC and proximal tricep  subscap TpR  subscap TpR     flexibility      assessment                         Neuro Re-Ed               tband ext               tband rows                               Rhythmic stability                                                               Ther Ex               UBE/pulleys   4'  3'  4'  4'     Stick flex x20 x20           Stick ER x20 x20           S/l ER   2x12           S/l abd x20 x20            tband rows      ashok tb 2x15  ashok tb 2x15  ashok tb 2x15     Tband er     Rtb 2x10  rtb 3x10-12  rtb 3x10     Tband ir     Rtb 2x10  rtb 3x10-12  rtb 3x12     Tband bicep curls     2x10 ashok tb  4# db 3x10  4# db 3x10     Standing full cans    1# 3x8 1# 3x8    Tricep press down    Ashok tb 3x10 Ashok tb 3x10    Supine scap retraction 2-way               Prone rows               Prone ext               Prone horiz abd                                bicep               tricep               education Reviewed above for HEP             Ther Activity                                               Modalities                                                  Access Code: [email or web address]  URL: [email or web address]  Date: 08/14/2024  Prepared by: Shiva Melara     Exercises  - Sidelying Shoulder External Rotation  - 1 x daily - 7 x weekly - 3 sets - 10 reps  - Sidelying Shoulder Abduction Palm Forward  - 1 x daily - 7 x weekly - 3 sets - 10 reps  - Supine Shoulder Flexion Extension AAROM with Dowel  - 1 x daily - 7 x weekly - 3 sets - 10 reps  - Supine Shoulder External Rotation with Dowel  - 1 x daily - 7 x weekly - 8 reps - 10 hold  - Standing Shoulder Row with Anchored Resistance  - 1 x daily - 7 x weekly - 3 sets - 10 reps

## 2024-09-25 ENCOUNTER — OFFICE VISIT (OUTPATIENT)
Dept: PHYSICAL THERAPY | Facility: CLINIC | Age: 74
End: 2024-09-25
Payer: COMMERCIAL

## 2024-09-25 DIAGNOSIS — M75.81 TENDINITIS OF RIGHT ROTATOR CUFF: Primary | ICD-10-CM

## 2024-09-25 PROCEDURE — 97140 MANUAL THERAPY 1/> REGIONS: CPT

## 2024-09-25 PROCEDURE — 97110 THERAPEUTIC EXERCISES: CPT

## 2024-09-25 NOTE — PROGRESS NOTES
Daily Note     Today's date: 2024  Patient name: Astrid Scott  : 1950  MRN: [id number]  Referring provider: Jun Gomez DO  Dx:   Encounter Diagnosis     ICD-10-CM    1. Tendinitis of right rotator cuff  M75.81                      Subjective: Patient reports shoulder doing okay unless she does too much.       Objective: See treatment diary below      Assessment: Astrid Scott tolerated treatment well. Discussed tiered approach to ADLs versus getting everything done at once. Tenderness subscap slightly improved. Continued treatment indicated.       Plan: Continue per plan of care.      Precautions: acute R shld pain    Daily Treatment Diary:        nitial Evaluation Date: 24  POC Expiration: 24  Compliance 24           Visit Number 1 2  3  4  5  6           Re-Eval  IE    RE              MC   Foto Captured Y                              Manuals    Joint work Gr 1-2 post mob  Gr 1-2 post mob  gr 2-3 post mob  gr 1-2 inf and post mob  gr 1-2 inf and post mob  gr 1-2 inf and post mob   ST Post RTC (improved IR to 40 deg) Post RTC  post RTC and proximal tricep  subscap TpR  subscap TpR  subscap TpR   flexibility      assessment                         Neuro Re-Ed               tband ext               tband rows                               Rhythmic stability                                                               Ther Ex               UBE/pulleys   4'  3'  4'  4'  5'   Stick flex x20 x20           Stick ER x20 x20           S/l ER   2x12           S/l abd x20 x20            tband rows      ashok tb 2x15  ashok tb 2x15  ashok tb 2x15  ashok tb 3x10   Tband er     Rtb 2x10  rtb 3x10-12  rtb 3x10  rtb 3x10   Tband ir     Rtb 2x10  rtb 3x10-12  rtb 3x12  rtb 3x12   Tband bicep curls     2x10 ashok tb  4# db 3x10  4# db 3x10  4# db 3x10   Standing full cans    1# 3x8 1# 3x8 1# 3x8   Tricep press down    Ashok tb 3x10 Ashok tb  3x10 Tavo tb 3x10   Supine scap retraction 2-way            pron  Rtb 2x10   Prone rows            3#   Prone ext               Prone horiz abd                               bicep               tricep               education Reviewed above for HEP             Ther Activity                                               Modalities                                                  Access Code: [email or web address]  URL: [email or web address]  Date: 08/14/2024  Prepared by: Shiva Melara     Exercises  - Sidelying Shoulder External Rotation  - 1 x daily - 7 x weekly - 3 sets - 10 reps  - Sidelying Shoulder Abduction Palm Forward  - 1 x daily - 7 x weekly - 3 sets - 10 reps  - Supine Shoulder Flexion Extension AAROM with Dowel  - 1 x daily - 7 x weekly - 3 sets - 10 reps  - Supine Shoulder External Rotation with Dowel  - 1 x daily - 7 x weekly - 8 reps - 10 hold  - Standing Shoulder Row with Anchored Resistance  - 1 x daily - 7 x weekly - 3 sets - 10 reps

## 2024-09-30 ENCOUNTER — OFFICE VISIT (OUTPATIENT)
Dept: PHYSICAL THERAPY | Facility: CLINIC | Age: 74
End: 2024-09-30
Payer: COMMERCIAL

## 2024-09-30 DIAGNOSIS — M75.81 TENDINITIS OF RIGHT ROTATOR CUFF: Primary | ICD-10-CM

## 2024-09-30 PROCEDURE — 97110 THERAPEUTIC EXERCISES: CPT | Performed by: PHYSICAL THERAPIST

## 2024-09-30 PROCEDURE — 97140 MANUAL THERAPY 1/> REGIONS: CPT | Performed by: PHYSICAL THERAPIST

## 2024-09-30 NOTE — PROGRESS NOTES
Daily Note     Today's date: 2024  Patient name: Astrid Scott  : 1950  MRN: [id number]  Referring provider: Jun Gomez DO  Dx:   Encounter Diagnosis     ICD-10-CM    1. Tendinitis of right rotator cuff  M75.81                      Subjective: The patient states that she had a yard sale over the weekend, did more lifting than normal.  Arm is sore today, rates pain 7/10 at start of session with movement, less pain at rest.       Objective: See treatment diary below      Assessment: Tolerated treatment well.  Despite being sore she was able to complete all TE as outlined below.  Improving strength noted but still limited.  She had good tolerance to all interventions and reported decreased pain at end of session.  Patient would benefit from continued PT to improve function.        Plan: Continue per plan of care.      Precautions: acute R shld pain    Daily Treatment Diary:        nitial Evaluation Date: 24  POC Expiration: 24  Compliance 24         Visit Number 1 2  3  4  5  6  7         Re-Eval  IE    RE              MC   Foto Captured Y                              Manuals    Joint work Gr 1-2 inf and post mob  Gr 1-2 post mob  gr 2-3 post mob  gr 1-2 inf and post mob  gr 1-2 inf and post mob  gr 1-2 inf and post mob   ST Subscap TpR Post RTC  post RTC and proximal tricep  subscap TpR  subscap TpR  subscap TpR   flexibility      assessment                         Neuro Re-Ed               tband ext               tband rows                               Rhythmic stability                                                               Ther Ex               UBE/pulleys  5' 4'  3'  4'  4'  5'   Stick flex  x20           Stick ER  x20           S/l ER  2x12           S/l abd  x20            tband rows  Blue TB  3x10    ashok tb 2x15  ashok tb 2x15  ashok tb 2x15  ashok tb 3x10   Tband er  RTB 3x10   Rtb 2x10  rtb 3x10-12   rtb 3x10  rtb 3x10   Tband ir  RTB 3x12   Rtb 2x10  rtb 3x10-12  rtb 3x12  rtb 3x12   Tband bicep curls  4# DB  3x10   2x10 tavo tb  4# db 3x10  4# db 3x10  4# db 3x10   Standing full cans 1# 3x8   1# 3x8 1# 3x8 1# 3x8   Tricep press down Blue TB  3x10   Tavo tb 3x10 Tavo tb 3x10 Tavo tb 3x10   Supine scap retraction 2-way  Prone RTB 2x10          pron  Rtb 2x10   Prone rows           3#   Prone ext               Prone horiz abd                               bicep               tricep              education              Ther Activity                                               Modalities                                                  Access Code: [email or web address]  URL: [email or web address]  Date: 08/14/2024  Prepared by: Shiva Melara     Exercises  - Sidelying Shoulder External Rotation  - 1 x daily - 7 x weekly - 3 sets - 10 reps  - Sidelying Shoulder Abduction Palm Forward  - 1 x daily - 7 x weekly - 3 sets - 10 reps  - Supine Shoulder Flexion Extension AAROM with Dowel  - 1 x daily - 7 x weekly - 3 sets - 10 reps  - Supine Shoulder External Rotation with Dowel  - 1 x daily - 7 x weekly - 8 reps - 10 hold  - Standing Shoulder Row with Anchored Resistance  - 1 x daily - 7 x weekly - 3 sets - 10 reps

## 2024-10-02 ENCOUNTER — OFFICE VISIT (OUTPATIENT)
Dept: PHYSICAL THERAPY | Facility: CLINIC | Age: 74
End: 2024-10-02
Payer: COMMERCIAL

## 2024-10-02 DIAGNOSIS — M75.81 TENDINITIS OF RIGHT ROTATOR CUFF: Primary | ICD-10-CM

## 2024-10-02 PROCEDURE — 97140 MANUAL THERAPY 1/> REGIONS: CPT

## 2024-10-02 PROCEDURE — 97110 THERAPEUTIC EXERCISES: CPT

## 2024-10-02 NOTE — PROGRESS NOTES
Daily Note     Today's date: 10/2/2024  Patient name: Astrid Scott  : 1950  MRN: [id number]  Referring provider: Jun Gomez DO  Dx:   Encounter Diagnosis     ICD-10-CM    1. Tendinitis of right rotator cuff  M75.81                      Subjective: Pt reports she is doing alright, is a bit sore today.       Objective: See treatment diary below      Assessment: Tolerated treatment well. Pt performed all exercises without issue, continue to progress to tolerance. Pt had some sensitivity with STM and PROM stretching today. Pt would benefit from continued focus on stretching and strengthening to tolerance. Ended session today with HP. Patient demonstrated fatigue post treatment, exhibited good technique with therapeutic exercises, and would benefit from continued PT      Plan: Continue per plan of care.      Precautions: acute R shld pain    Daily Treatment Diary:        nitial Evaluation Date: 24  POC Expiration: 24  Compliance 08/12/24  8/14  9/16  9/18  9/23  9/25  9/30  10/2       Visit Number 1 2  3  4  5  6  7  8       Re-Eval  IE    RE              MC   Foto Captured Y                              Manuals 9/30 10/2  9/16  9/18  9/23  9/25   Joint work Gr 1-2 inf and post mob  PROM stretching  gr 2-3 post mob  gr 1-2 inf and post mob  gr 1-2 inf and post mob  gr 1-2 inf and post mob   ST Subscap TpR Subscap TpR  post RTC and proximal tricep  subscap TpR  subscap TpR  subscap TpR   flexibility     assessment                        Neuro Re-Ed              tband ext              tband rows                             Rhythmic stability                                                           Ther Ex              UBE/pulleys  5' 5'  3'  4'  4'  5'   Stick flex             Stick ER             S/l ER             S/l abd              tband rows  Blue TB  3x10 Blue TB 3x10  ashok tb 2x15  ashok tb 2x15  ashok tb 2x15  ashok tb 3x10   Tband er  RTB 3x10 RTB 3x10 Rtb 2x10  rtb 3x10-12  rtb 3x10  rtb  3x10   Tband ir  RTB 3x12 RTB 3x12 Rtb 2x10  rtb 3x10-12  rtb 3x12  rtb 3x12   Tband bicep curls  4# DB  3x10 4# DB 3x10 2x10 tavo tb  4# db 3x10  4# db 3x10  4# db 3x10   Standing full cans 1# 3x8 1# 3x8  1# 3x8 1# 3x8 1# 3x8   Tricep press down Blue TB  3x10 Blue TB 3x10  Tavo tb 3x10 Tavo tb 3x10 Tavo tb 3x10   Supine scap retraction 2-way  Prone RTB 2x10 nv        pron  Rtb 2x10   Prone rows          3#   Prone ext              Prone horiz abd                             bicep              tricep             education             Ther Activity                                            Modalities                                               Access Code: [email or web address]  URL: [email or web address]  Date: 08/14/2024  Prepared by: Shiva Melara     Exercises  - Sidelying Shoulder External Rotation  - 1 x daily - 7 x weekly - 3 sets - 10 reps  - Sidelying Shoulder Abduction Palm Forward  - 1 x daily - 7 x weekly - 3 sets - 10 reps  - Supine Shoulder Flexion Extension AAROM with Dowel  - 1 x daily - 7 x weekly - 3 sets - 10 reps  - Supine Shoulder External Rotation with Dowel  - 1 x daily - 7 x weekly - 8 reps - 10 hold  - Standing Shoulder Row with Anchored Resistance  - 1 x daily - 7 x weekly - 3 sets - 10 reps

## 2024-10-07 ENCOUNTER — OFFICE VISIT (OUTPATIENT)
Dept: PHYSICAL THERAPY | Facility: CLINIC | Age: 74
End: 2024-10-07
Payer: COMMERCIAL

## 2024-10-07 DIAGNOSIS — M75.81 TENDINITIS OF RIGHT ROTATOR CUFF: Primary | ICD-10-CM

## 2024-10-07 PROCEDURE — 97110 THERAPEUTIC EXERCISES: CPT

## 2024-10-07 PROCEDURE — 97140 MANUAL THERAPY 1/> REGIONS: CPT

## 2024-10-07 NOTE — PROGRESS NOTES
Daily Note     Today's date: 10/7/2024  Patient name: Astrid Scott  : 1950  MRN: [id number]  Referring provider: Jun Gomez DO  Dx:   Encounter Diagnosis     ICD-10-CM    1. Tendinitis of right rotator cuff  M75.81                      Subjective: Patient reports she is having improved function but it is improving slowly.       Objective: See treatment diary below      Assessment: Astrid Scott tolerated increased manual techniques to improve functional mobility. Improved flexion and abduction noted this visit. Discussed clinical reasoning for manual work and expected outcomes. She appeared to understand. Continued treatment indicated.       Plan: Continue per plan of care.      Precautions: acute R shld pain    Daily Treatment Diary:        nitial Evaluation Date: 24  POC Expiration: 24  Compliance 08/12/24  8/14  9/16  9/18  9/23  9/25  9/30  10/2  10/7     Visit Number 1 2  3  4  5  6  7  8  9     Re-Eval  IE    RE              MC   Foto Captured Y                              Manuals 9/30 10/2 10/7  9/18  9/23  9/25   Joint work Gr 1-2 inf and post mob  PROM stretching Posterior capsule, scapular  gr 1-2 inf and post mob  gr 1-2 inf and post mob  gr 1-2 inf and post mob   ST Subscap TpR Subscap TpR Subscap, medial scapular border and posterior capsule  subscap TpR  subscap TpR  subscap TpR   flexibility    PROM                       Neuro Re-Ed             tband ext             tband rows                           Rhythmic stability                                                       Ther Ex             UBE/pulleys  5' 5' 5'  4'  4'  5'   Stick flex            Stick ER            S/l ER   3x8         S/l abd   3x8          tband rows  Blue TB  3x10 Blue TB 3x10 Blue TB 3x10  ashok tb 2x15  ashok tb 2x15  ashok tb 3x10   Tband er  RTB 3x10 RTB 3x10 GTB 3x8  rtb 3x10-12  rtb 3x10  rtb 3x10   Tband ir  RTB 3x12 RTB 3x12 Gtb 3x10  rtb 3x10-12  rtb 3x12  rtb 3x12   Tband bicep curls  4#  DB  3x10 4# DB 3x10 5# DB 3x8  4# db 3x10  4# db 3x10  4# db 3x10   Standing full cans 1# 3x8 1# 3x8 2# 3x8 1# 3x8 1# 3x8 1# 3x8   Tricep press down Blue TB  3x10 Blue TB 3x10 Blk   3x10 Tavo tb 3x10 Tavo tb 3x10 Tavo tb 3x10   Supine scap retraction 2-way  Pron RTB 2x10 nv Pron RTB 2x10      pron  Rtb 2x10   Prone rows   3x8      3#   Prone ext    3x8         Prone horiz abd                           bicep             tricep            education   As noted in Assessment         Ther Activity                                         Modalities                                            Access Code: [email or web address]  URL: [email or web address]  Date: 08/14/2024  Prepared by: Shiva Melara     Exercises  - Sidelying Shoulder External Rotation  - 1 x daily - 7 x weekly - 3 sets - 10 reps  - Sidelying Shoulder Abduction Palm Forward  - 1 x daily - 7 x weekly - 3 sets - 10 reps  - Supine Shoulder Flexion Extension AAROM with Dowel  - 1 x daily - 7 x weekly - 3 sets - 10 reps  - Supine Shoulder External Rotation with Dowel  - 1 x daily - 7 x weekly - 8 reps - 10 hold  - Standing Shoulder Row with Anchored Resistance  - 1 x daily - 7 x weekly - 3 sets - 10 reps

## 2024-10-08 ENCOUNTER — APPOINTMENT (OUTPATIENT)
Dept: PHYSICAL THERAPY | Facility: CLINIC | Age: 74
End: 2024-10-08
Payer: COMMERCIAL

## 2024-10-09 ENCOUNTER — HOSPITAL ENCOUNTER (OUTPATIENT)
Dept: RADIOLOGY | Facility: CLINIC | Age: 74
Discharge: HOME | End: 2024-10-09
Payer: COMMERCIAL

## 2024-10-09 ENCOUNTER — OFFICE VISIT (OUTPATIENT)
Dept: OBGYN CLINIC | Facility: CLINIC | Age: 74
End: 2024-10-09
Payer: COMMERCIAL

## 2024-10-09 VITALS
HEIGHT: 64 IN | HEART RATE: 52 BPM | TEMPERATURE: 97.9 F | OXYGEN SATURATION: 99 % | WEIGHT: 173.6 LBS | DIASTOLIC BLOOD PRESSURE: 82 MMHG | SYSTOLIC BLOOD PRESSURE: 148 MMHG | BODY MASS INDEX: 29.64 KG/M2

## 2024-10-09 DIAGNOSIS — M47.12 OSTEOARTHRITIS OF CERVICAL SPINE WITH MYELOPATHY: ICD-10-CM

## 2024-10-09 DIAGNOSIS — M25.511 ACUTE PAIN OF RIGHT SHOULDER: ICD-10-CM

## 2024-10-09 DIAGNOSIS — M25.511 ACUTE PAIN OF RIGHT SHOULDER: Primary | ICD-10-CM

## 2024-10-09 PROCEDURE — 72040 X-RAY EXAM NECK SPINE 2-3 VW: CPT

## 2024-10-09 PROCEDURE — 99213 OFFICE O/P EST LOW 20 MIN: CPT | Performed by: ORTHOPAEDIC SURGERY

## 2024-10-09 NOTE — PATIENT INSTRUCTIONS
Physical therapy concentrate on the cervical spine program.  Refer the patient to Dr. Meneses for his expertise.  MRI will be ordered of the cervical spine.  Physical therapy is to concentrate on the cervical spine program only and may discontinue the shoulder therapy program.  May use moist heat for comfort.  Continue over-the-counter pain meds if needed.

## 2024-10-09 NOTE — PROGRESS NOTES
ASSESSMENT/PLAN:    Diagnoses and all orders for this visit:    Acute pain of right shoulder  -     XR spine cervical 2 or 3 vw injury; Future    Osteoarthritis of cervical spine with myelopathy    Physical therapy concentrate on the cervical spine program.  Refer the patient to Dr. Meneses for his expertise.  MRI will be ordered of the cervical spine.  Physical therapy is to concentrate on the cervical spine program only and may discontinue the shoulder therapy program.  May use moist heat for comfort.  Continue over-the-counter pain meds if needed.    Return Referred to Dr. Meneses.  _____________________________________________________  CHIEF COMPLAINT:  Chief Complaint   Patient presents with    Right Shoulder - Follow-up, Pain    Right Hip - Follow-up     SUBJECTIVE:  Astrid Scott is a 74 y.o. year old female who presents for follow up of her right shoulder.  By therapy  notes, she has minimal improvement in functional with PT. over she reports that her shoulder is not really any better.  She describes the pain is more in the back of her neck region and down at her distal middle area just proximal to her elbow.  She denies radiation of pain down her neck into her fingers. She was last seen by orthopedics on 8/7/2024 when she was started in therapy.  She did not receive a cortisone injection.  He was recently on vacation and felt better on vacation but was not doing much activity.  Notes she has trouble getting her arm behind her back at her waist level.  States she can lift her arms in front of her and to the side without problems.  She denies specific neck pain.    PAST MEDICAL HISTORY:  Past Medical History:   Diagnosis Date    Allergic     SeAsonal    Essential hypertension     GERD (gastroesophageal reflux disease)     Hyperlipidemia     Hypertension     Skin cancer     1991     PAST SURGICAL HISTORY:  Past Surgical History:   Procedure Laterality Date    BASAL CELL CARCINOMA EXCISION      BREAST  EXCISIONAL BIOPSY Left     Benign- 1991    BREAST SURGERY  1993    Breast biopsy    FRACTURE SURGERY  12/9/2019    Partial hip replacement    HERNIA REPAIR      HIP SURGERY      TX HEMIARTHROPLASTY HIP PARTIAL Right 12/09/2019    Procedure: HEMIARTHROPLASTY HIP (BIPOLAR);  Surgeon: Jun Gomez;  Location: [facility];  Service: Orthopedics     FAMILY HISTORY:  Family History   Problem Relation Age of Onset    Colon cancer Paternal Grandmother     Diabetes Mother     Hypertension Mother     Coronary artery disease Mother     Heart disease Mother     Coronary artery disease Father     Hearing loss Father     Vision loss Father     Completed Suicide  Father     No Known Problems Sister     No Known Problems Daughter     Stroke Maternal Grandmother     No Known Problems Maternal Grandfather     Lung cancer Paternal Grandfather     Cancer Paternal Grandfather     No Known Problems Daughter     Lung cancer Paternal Aunt     No Known Problems Paternal Aunt      SOCIAL HISTORY:  Social History     Tobacco Use    Smoking status: Never     Passive exposure: Never    Smokeless tobacco: Never   Vaping Use    Vaping status: Never Used   Substance Use Topics    Alcohol use: Yes     Comment: Occasionally     Drug use: Never     MEDICATIONS:    Current Outpatient Medications:     Ascorbic Acid (vitamin C) 100 MG tablet, Take 100 mg by mouth daily, Disp: , Rfl:     calcium carbonate-vitamin D (OSCAL-D) 500 mg-200 units per tablet, Take 1 tablet by mouth daily, Disp: , Rfl:     magnesium (MAGTAB) 84 MG (7MEQ) TBCR, Take 84 mg by mouth daily, Disp: , Rfl:     Misc Natural Products (GLUCOSAMINE CHOND COMPLEX/MSM PO), Take 1,500 mg by mouth, Disp: , Rfl:     montelukast (SINGULAIR) 10 mg tablet, TAKE 1 TABLET BY MOUTH EVERY DAY, Disp: 100 tablet, Rfl: 1    omeprazole (PriLOSEC) 20 mg delayed release capsule, Take 20 mg by mouth as needed, Disp: , Rfl:     triamterene-hydrochlorothiazide (MAXZIDE-25) 37.5-25 mg per tablet, TAKE 1  TABLET BY MOUTH EVERY DAY, Disp: 90 tablet, Rfl: 0    Turmeric 500 MG CAPS, Take by mouth, Disp: , Rfl:     vitamin B-12 (VITAMIN B-12) 500 mcg tablet, Take 500 mcg by mouth daily, Disp: , Rfl:     ALLERGIES:  Allergies   Allergen Reactions    Codeine Hives     REVIEW OF SYSTEMS:  Pertinent items are noted in HPI.  A comprehensive review of systems was negative.  _____________________________________________________  PHYSICAL EXAMINATION:  General: well developed and well nourished, alert, and oriented times 3  Psychiatric: Normal  HEENT:  Normocephalic, atraumatic  Cardiovascular:  Regular  Pulmonary: No wheezing or stridor  Skin: No masses, erthema, lacerations, fluctation, ulcerations  Neurovascular: Pulses intact.  Light touch sensation is intact and symmetric throughout the bilateral upper extremities.  Cubital radialis reflex 2/4 and symmetric bilaterally.  Biceps reflex 2+/1 symmetric bilaterally.  Triceps reflex 1+ and symmetric bilaterally.  MUSCULOSKELETAL EXAMINATION:    Right shoulder without effusion or redness.  And asked to locate her pain she will point to the upper trapezius region near the cervical spine and the distal humeral area tricep region.  He does not note any specific pain over the rotator cuff distribution or at the AC joint or glenohumeral joint region.  She has grossly full forward flexion and abduction.  She has limited internal rotation behind her back with thumb level the midclavicular line to approximately L2 of the involved side and 10 of the uninvolved side.  She has no weakness with belly press test, warm blower's test or Jobes testing.  There is no weakness with resisted external rotation with the arm adducted at the side.  She has some difficulty with subscap lift off test but this is more positional.  There is fluid internal and external range of motion of the arm adducted at the side.    Cervical spine slight decreased range of motion with left side bend when compared to  right side bend otherwise range of motion is grossly symmetric and age-appropriate.  She has a negative Spurling's maneuver.  Increased symptoms with full neck extension or flexion.  _____________________________________________________  STUDIES REVIEWED:  PT notes reviewed last note of 10/7/2024 noting improved function slowly.    Personally reviewed cervical spine x-rays taken in the office today which document loss of the normal lordotic curvature of the cervical spine.  There is multilevel degenerative disc disease.    PROCEDURES PERFORMED:  None today.      Iván Mckeon PA-C

## 2024-10-10 ENCOUNTER — EVALUATION (OUTPATIENT)
Dept: PHYSICAL THERAPY | Facility: CLINIC | Age: 74
End: 2024-10-10
Payer: COMMERCIAL

## 2024-10-10 DIAGNOSIS — M75.81 TENDINITIS OF RIGHT ROTATOR CUFF: Primary | ICD-10-CM

## 2024-10-10 PROCEDURE — 97110 THERAPEUTIC EXERCISES: CPT | Performed by: PHYSICAL THERAPIST

## 2024-10-10 PROCEDURE — 97140 MANUAL THERAPY 1/> REGIONS: CPT | Performed by: PHYSICAL THERAPIST

## 2024-10-10 NOTE — PROGRESS NOTES
Daily Note     Today's date: 10/10/2024  Patient name: Astrid Scott  : 1950  MRN: [id number]  Referring provider: Jun Gomez DO  Dx:   Encounter Diagnosis     ICD-10-CM    1. Tendinitis of right rotator cuff  M75.81                      Subjective: saw ortho for follow-up who felt there is a cervical component. Has been having a lot of mid humerus pain last day or two.       Objective: R cervical ROT 40, L 50  R SB: 25, L SB: 20 with pain  Quadrant no reproduction of shoulder pain but had some discomfort in base of neck    Thickness noted along R cervical paraspinals and UT    Shoulder: pain remains with active abd, reach BB, terminal knee ext    Elbow and wrist strength       Assessment: still feel there is 2 different issues with shoulder component and cervical component is responsible for more UT discomfort.     Improved pain/stiffness with active ROT after MT and cervical TE. Spent time educating patient on findings yet of both cervical and shoulder findings. Tolerated treatment fair. Patient would benefit from continued PT      Plan: Continue per plan of care.      Precautions: acute R shld pain    Daily Treatment Diary:        nitial Evaluation Date: 24  POC Expiration: 24  Compliance 08/12/24  8/14  9/16  9/18  9/23  9/25  9/30  10/2  10/7  10/10   Visit Number 1 2  3  4  5  6  7  8  9  10   Re-Eval  IE    RE                 Foto Captured Y                              Manuals 9/30 10/2 10/7 10/10     Joint work Gr 1-2 inf and post mob  PROM stretching Posterior capsule, scapular Gentle cervical traction      ST Subscap TpR Subscap TpR Subscap, medial scapular border and posterior capsule R cervical paraspinal and articular pillar     flexibility    PROM            assessment     Neuro Re-Ed          tband ext          tband rows                     Rhythmic stability                                           Ther Ex          UBE/pulleys  5' 5' 5'      Stick flex         Stick  ER         Cervical ROT isometric    X10 ea     Cervical retraction    x10              S/l ER   3x8      S/l abd   3x8       tband rows  Blue TB  3x10 Blue TB 3x10 Blue TB 3x10 Tavo tb 3x10     Tband er  RTB 3x10 RTB 3x10 GTB 3x8 Gtb 4x10     Tband ir  RTB 3x12 RTB 3x12 Gtb 3x10 Gtb 4x10     Tband bicep curls  4# DB  3x10 4# DB 3x10 5# DB 3x8      Standing full cans 1# 3x8 1# 3x8 2# 3x8      Tricep press down Blue TB  3x10 Blue TB 3x10 Blk   3x10      Supine scap retraction 2-way  Pron RTB 2x10 nv Pron RTB 2x10      Prone rows   3x8      Prone ext    3x8      Prone horiz abd                     bicep          tricep         education   As noted in Assessment      Ther Activity                                Modalities                                   Access Code: [email or web address]  URL: [email or web address]  Date: 08/14/2024  Prepared by: Shiva Melara     Exercises  - Sidelying Shoulder External Rotation  - 1 x daily - 7 x weekly - 3 sets - 10 reps  - Sidelying Shoulder Abduction Palm Forward  - 1 x daily - 7 x weekly - 3 sets - 10 reps  - Supine Shoulder Flexion Extension AAROM with Dowel  - 1 x daily - 7 x weekly - 3 sets - 10 reps  - Supine Shoulder External Rotation with Dowel  - 1 x daily - 7 x weekly - 8 reps - 10 hold  - Standing Shoulder Row with Anchored Resistance  - 1 x daily - 7 x weekly - 3 sets - 10 reps

## 2024-10-15 ENCOUNTER — OFFICE VISIT (OUTPATIENT)
Dept: PHYSICAL THERAPY | Facility: CLINIC | Age: 74
End: 2024-10-15
Payer: COMMERCIAL

## 2024-10-15 DIAGNOSIS — M75.81 TENDINITIS OF RIGHT ROTATOR CUFF: Primary | ICD-10-CM

## 2024-10-15 PROCEDURE — 97140 MANUAL THERAPY 1/> REGIONS: CPT

## 2024-10-15 PROCEDURE — 97110 THERAPEUTIC EXERCISES: CPT

## 2024-10-15 NOTE — PROGRESS NOTES
Daily Note     Today's date: 10/15/2024  Patient name: Astrid Scott  : 1950  MRN: [id number]  Referring provider: Jun Gomez DO  Dx:   Encounter Diagnosis     ICD-10-CM    1. Tendinitis of right rotator cuff  M75.81                      Subjective: Patient reports she had significant relief with last treatment.      Objective: See treatment diary below      Assessment: Astrid Scott tolerated treatment well. Good response to manual work. Rotations appear to have improved. Continued treatment indicated.       Plan: Continue per plan of care.      Precautions: acute R shld pain    Daily Treatment Diary:        nitial Evaluation Date: 24  POC Expiration: 24  Compliance 10/15  8/14  9/16  9/18  9/23  9/25  9/30  10/2  10/7  10/10   Visit Number 11 2  3  4  5  6  7  8  9  10   Re-Eval      RE                 Foto Captured                               Manuals 9/30 10/2 10/7 10/10 10/15    Joint work Gr 1-2 inf and post mob  PROM stretching Posterior capsule, scapular Gentle cervical traction  Gentle cervical traction     ST Subscap TpR Subscap TpR Subscap, medial scapular border and posterior capsule R cervical paraspinal and articular pillar R cervical paraspinal and articular pillar    flexibility    PROM            assessment     Neuro Re-Ed          tband ext          tband rows                     Rhythmic stability                                           Ther Ex          UBE/pulleys  5' 5' 5'  5'    Stick flex         Stick ER         Cervical ROT isometric    X10 ea X10 ea    Cervical retraction    x10 x10             S/l ER   3x8      S/l abd   3x8       tband rows  Blue TB  3x10 Blue TB 3x10 Blue TB 3x10 Tavo tb 3x10 Tavo tb 3x10    Tband er  RTB 3x10 RTB 3x10 GTB 3x8 Gtb 4x10 Gtb 4x10    Tband ir  RTB 3x12 RTB 3x12 Gtb 3x10 Gtb 4x10 Gtb 4x10    Tband bicep curls  4# DB  3x10 4# DB 3x10 5# DB 3x8      Standing full cans 1# 3x8 1# 3x8 2# 3x8      Tricep press down Blue TB  3x10  Blue TB 3x10 Blk   3x10      Supine scap retraction 2-way  Pron RTB 2x10 nv Pron RTB 2x10      Prone rows   3x8      Prone ext    3x8      Prone horiz abd                     bicep          tricep         education   As noted in Assessment      Ther Activity                                Modalities                                   Access Code: [email or web address]  URL: [email or web address]  Date: 08/14/2024  Prepared by: Shiva Melara     Exercises  - Sidelying Shoulder External Rotation  - 1 x daily - 7 x weekly - 3 sets - 10 reps  - Sidelying Shoulder Abduction Palm Forward  - 1 x daily - 7 x weekly - 3 sets - 10 reps  - Supine Shoulder Flexion Extension AAROM with Dowel  - 1 x daily - 7 x weekly - 3 sets - 10 reps  - Supine Shoulder External Rotation with Dowel  - 1 x daily - 7 x weekly - 8 reps - 10 hold  - Standing Shoulder Row with Anchored Resistance  - 1 x daily - 7 x weekly - 3 sets - 10 reps

## 2024-10-17 ENCOUNTER — OFFICE VISIT (OUTPATIENT)
Dept: PHYSICAL THERAPY | Facility: CLINIC | Age: 74
End: 2024-10-17
Payer: COMMERCIAL

## 2024-10-17 DIAGNOSIS — M75.81 TENDINITIS OF RIGHT ROTATOR CUFF: Primary | ICD-10-CM

## 2024-10-17 PROCEDURE — 97140 MANUAL THERAPY 1/> REGIONS: CPT | Performed by: PHYSICAL THERAPIST

## 2024-10-17 PROCEDURE — 97110 THERAPEUTIC EXERCISES: CPT | Performed by: PHYSICAL THERAPIST

## 2024-10-17 NOTE — PROGRESS NOTES
PT Re-Evaluation     Today's date: 10/17/2024  Patient name: Astrid Scott  : 1950  MRN: [id number]  Referring provider: Jun Gomez DO  Dx:   Encounter Diagnosis     ICD-10-CM    1. Tendinitis of right rotator cuff  M75.81                          Assessment  Symptom irritability: moderate    Assessment details: Over last few weeks have added cervical treatment and she has responded well both objectively and subjectively. She is he has improved shoulder motion and strength. Still has pain and limitation with abduction motion/strength and notable restriction R cervical rotation. Based on recent positive changes, feel she would continue to benefit from skilled PT to address remaining shoulder and neck findings and promote return to pain free lifestyle and function.    Understanding of Dx/Px/POC: good     Prognosis: good    Goals  St week  Patient will be independent with home program focused on pain control and motion as she is leaving for longer vacation    LT-12 weeks (due to vacation) - progressing towards  Active ROM within 90% L compared to R with minimal pain  Independent with RTC and scapular strength program  Minimal to no pain with donning shirt and pants normally  Minimal pain with reaching to side  Er and IR MMT 4+/5 and flex MMT 4/5 or greater    Cervical R ROT 45 deg to allow symmetrical mobility and functional motion at her neck  Independent with cervical strength HEP    Plan  Patient would benefit from: skilled physical therapy  Planned modality interventions: cryotherapy and thermotherapy: hydrocollator packs    Planned therapy interventions: manual therapy, neuromuscular re-education, self care, therapeutic activities, therapeutic exercise and home exercise program    Frequency: 2x week  Duration in weeks: 8  Plan of Care beginning date: 10/17/2024  Plan of Care expiration date: 2024  Treatment plan discussed with: patient  Plan details: TE, NMR, TA, MT, self education,  and modalities as needed in order to progress through skilled PT focused on  ROM, strength, balance, coordination             Subjective Evaluation    History of Present Illness  Mechanism of injury: Patient has had notable improvement with pain and function over last 2-3 visits. Less pain into arm and more motion. Pleased with recent progress. Still has pain with reaching to side.  Patient Goals  Patient goals for therapy: decreased pain, increased motion, increased strength, independence with ADLs/IADLs and return to sport/leisure activities    Pain  At worst pain ratin          Objective  Observation/posture  Slight kyphosis      Cervical screen  R ROT 25 deg, L ROT 40 deg  SB: minimal b/l        Shoulder AROM R/L  Flex compensated 155/160  Abd 160 slight discomfort/160  ER 75 with discomfort end-range/75  IR - reach BB: R pain and compensation to L5/ left L1        Shoulder PROM R/L  Flex 160 discomfort end-range/160  Abd 160 discomfort end-range/160  Er 85/85  Ir 40 w/ discomfort/50          MMT R/L  Flex: 4-/5  Abd: 4*/5  ER: 4+/4+  IR: 4+/5      Palpation  Tender through R cervical paraspinal and articular pillar, levator/UT - thickness noted R side           Precautions: acute R shld pain    Daily Treatment Diary:           POC Expiration: 24  Compliance 10/15 10/17    9/23  9/25  9/30  10/2  10/7  10/10   Visit Number 11 12    5  6  7  8  9  10   Re-Eval                     Foto Captured                                Manuals 9/30 10/2 10/7 10/10 10/15  10/17   Joint work Gr 1-2 inf and post mob  PROM stretching Posterior capsule, scapular Gentle cervical traction  Gentle cervical traction   gentle cervical traction   ST Subscap TpR Subscap TpR Subscap, medial scapular border and posterior capsule R cervical paraspinal and articular pillar R cervical paraspinal and articular pillar  R cervical paraspinal and articular pillar, UT/levator   flexibility     PROM                 assessment     assessment   Neuro Re-Ed               tband ext               tband rows                               Rhythmic stability                                                               Ther Ex               UBE/pulleys  5' 5' 5'   5'  4' pulleys   Stick flex               Stick ER               Cervical ROT isometric       X10 ea X10 ea  x10 ea   Cervical retraction       x10 x10  2x10 wall                   S/l ER     3x8         S/l abd     3x8          tband rows  Blue TB  3x10 Blue TB 3x10 Blue TB 3x10 Tavo tb 3x10 Tavo tb 3x10  tavo tb 3x10   Tband er  RTB 3x10 RTB 3x10 GTB 3x8 Gtb 4x10 Gtb 4x10  gtb 4x10   Tband ir  RTB 3x12 RTB 3x12 Gtb 3x10 Gtb 4x10 Gtb 4x10  gtb 4x10   Tband bicep curls  4# DB  3x10 4# DB 3x10 5# DB 3x8         Standing full cans 1# 3x8 1# 3x8 2# 3x8         Tricep press down Blue TB  3x10 Blue TB 3x10 Blk   3x10         Supine scap retraction 2-way  Pron RTB 2x10 nv Pron RTB 2x10         Prone rows     3x8         Prone ext     3x8         Prone horiz abd                               bicep               tricep               education     As noted in Assessment         Ther Activity                                               Modalities                                                  Access Code: [email or web address]  URL: [email or web address]  Date: 08/14/2024  Prepared by: Shiva Melara     Exercises  - Sidelying Shoulder External Rotation  - 1 x daily - 7 x weekly - 3 sets - 10 reps  - Sidelying Shoulder Abduction Palm Forward  - 1 x daily - 7 x weekly - 3 sets - 10 reps  - Supine Shoulder Flexion Extension AAROM with Dowel  - 1 x daily - 7 x weekly - 3 sets - 10 reps  - Supine Shoulder External Rotation with Dowel  - 1 x daily - 7 x weekly - 8 reps - 10 hold  - Standing Shoulder Row with Anchored Resistance  - 1 x daily - 7 x weekly - 3 sets - 10 reps

## 2024-10-22 ENCOUNTER — OFFICE VISIT (OUTPATIENT)
Dept: PHYSICAL THERAPY | Facility: CLINIC | Age: 74
End: 2024-10-22
Payer: COMMERCIAL

## 2024-10-22 DIAGNOSIS — M75.81 TENDINITIS OF RIGHT ROTATOR CUFF: Primary | ICD-10-CM

## 2024-10-22 PROCEDURE — 97140 MANUAL THERAPY 1/> REGIONS: CPT | Performed by: PHYSICAL THERAPIST

## 2024-10-22 PROCEDURE — 97110 THERAPEUTIC EXERCISES: CPT | Performed by: PHYSICAL THERAPIST

## 2024-10-22 NOTE — PROGRESS NOTES
Crystal Wang is a 57 year old female presenting  Follow up BP    Home numbers: she didn't bring it , she has a BP cuff at home that we already compared but she cant remember so she will bring it next visit , card given    Exercise: not too much, walk the dog. She has a cardio machine and an universal weight lifting machine    BI given    Concerns: None      Allergies, history and meds reviewed and updated    Wore mask yes  Pt wore mask yes    Health Maintenance Due   Topic Date Due   • COVID-19 Vaccine (1) Never done   • Shingles Vaccine (1 of 2) Never done   • Depression Screening  06/24/2021   • Influenza Vaccine (1) 09/01/2021       Patient is due for topics as listed above but is not proceeding with Immunization(s) COVID-19, Influenza and Shingles at this time. Refuses immunizations        Daily Note     Today's date: 10/22/2024  Patient name: Astrid Scott  : 1950  MRN: [id number]  Referring provider: Jun Gomez DO  Dx:   Encounter Diagnosis     ICD-10-CM    1. Tendinitis of right rotator cuff  M75.81           Start Time: 1015  Stop Time: 1057  Total time in clinic (min): 42 minutes    Subjective: arm doing well. Had 1 -2 days of neck discomfort that limited range but after hot shower this improved. Overall very pleased with progress.      Objective: See treatment diary below      Assessment: pt responding well. Cued for correct position with retraction and spent time reviewing proper form as with progression to wall, may have triggered muscle soreness. Will be away for a few days. Reviewed HEP and provded with tband. Tolerated treatment well. Patient would benefit from continued PT      Plan: Continue per plan of care.      Precautions: acute R shld pain    Daily Treatment Diary:      POC Expiration: 24  Compliance 10/15 10/17  10/22    9/23  9/25  9/30  10/2  10/7  10/10   Visit Number 11 12  13    5  6  7  8  9  10   Re-Eval                        Foto Captured                                Manuals 10/22   10/10 10/15  10/17   Joint work Gentle cervical traction   Gentle cervical traction  Gentle cervical traction   gentle cervical traction   ST R cervical paraspinal and articular pillar, ut/levator   R cervical paraspinal and articular pillar R cervical paraspinal and articular pillar  R cervical paraspinal and articular pillar, UT/levator   flexibility                 assessment    assessment   Neuro Re-Ed            tband ext            tband rows                         Rhythmic stability                                                   Ther Ex            UBE/pulleys      5'  4' pulleys   Stick flex            Stick ER            Cervical ROT isometric 2x10 ea   X10 ea X10 ea  x10 ea   Cervical retraction 2x10   x10 x10  2x10 wall                S/l ER            S/l  abd             tband rows Blk tb 3x10   Tavo tb 3x10 Tavo tb 3x10  tavo tb 3x10   Tband er Gtb 3x10   Gtb 4x10 Gtb 4x10  gtb 4x10   Tband ir Gtb 3x10   Gtb 4x10 Gtb 4x10  gtb 4x10   Tband bicep curls            Standing full cans            Tricep press down            Supine scap retraction 2-way            Prone rows            Prone ext            Prone horiz abd                         bicep            tricep            education Spent time reviewing HEP, provided tband as she will be traveling           Ther Activity                                      Modalities                                         Access Code: [email or web address]  URL: [email or web address]  Date: 08/14/2024  Prepared by: Shiva Melara     Exercises  - Sidelying Shoulder External Rotation  - 1 x daily - 7 x weekly - 3 sets - 10 reps  - Sidelying Shoulder Abduction Palm Forward  - 1 x daily - 7 x weekly - 3 sets - 10 reps  - Supine Shoulder Flexion Extension AAROM with Dowel  - 1 x daily - 7 x weekly - 3 sets - 10 reps  - Supine Shoulder External Rotation with Dowel  - 1 x daily - 7 x weekly - 8 reps - 10 hold  - Standing Shoulder Row with Anchored Resistance  - 1 x daily - 7 x weekly - 3 sets - 10 reps

## 2024-10-31 DIAGNOSIS — I10 ESSENTIAL HYPERTENSION: ICD-10-CM

## 2024-10-31 RX ORDER — TRIAMTERENE AND HYDROCHLOROTHIAZIDE 37.5; 25 MG/1; MG/1
1 TABLET ORAL DAILY
Qty: 90 TABLET | Refills: 1 | Status: SHIPPED | OUTPATIENT
Start: 2024-10-31

## 2024-11-04 ENCOUNTER — HOSPITAL ENCOUNTER (OUTPATIENT)
Dept: MRI IMAGING | Facility: HOSPITAL | Age: 74
Discharge: HOME | End: 2024-11-04
Payer: COMMERCIAL

## 2024-11-04 DIAGNOSIS — M47.12 OSTEOARTHRITIS OF CERVICAL SPINE WITH MYELOPATHY: ICD-10-CM

## 2024-11-04 PROCEDURE — 72141 MRI NECK SPINE W/O DYE: CPT

## 2024-11-05 ENCOUNTER — OFFICE VISIT (OUTPATIENT)
Dept: PHYSICAL THERAPY | Facility: CLINIC | Age: 74
End: 2024-11-05
Payer: COMMERCIAL

## 2024-11-05 DIAGNOSIS — M75.81 TENDINITIS OF RIGHT ROTATOR CUFF: Primary | ICD-10-CM

## 2024-11-05 PROCEDURE — 97140 MANUAL THERAPY 1/> REGIONS: CPT | Performed by: PHYSICAL THERAPIST

## 2024-11-05 PROCEDURE — 97110 THERAPEUTIC EXERCISES: CPT | Performed by: PHYSICAL THERAPIST

## 2024-11-05 NOTE — PROGRESS NOTES
Daily Note     Today's date: 2024  Patient name: Astrid Scott  : 1950  MRN: [id number]  Referring provider: Jun Gomez DO  Dx:   Encounter Diagnosis     ICD-10-CM    1. Tendinitis of right rotator cuff  M75.81                      Subjective: felt pretty good while away on vacation. Came back and raked leaves which caused some discomfort into right tricep area again. Had MRI and first appt with pain management this Fri      Objective: R cervical ROT limited - 25 deg today      Assessment: did well with manual therapy, active R cervical ROT 40 deg at end of session. Added thoracic mobility today. Tolerated treatment well. Patient would benefit from continued PT      Plan: Continue per plan of care.      Precautions: acute R shld pain    Daily Treatment Diary:      POC Expiration: 24  Compliance 10/15 10/17  10/22  11/5  9/23  9/25  9/30  10/2  10/7  10/10   Visit Number 11 12  13  14  5  6  7  8  9  10   Re-Eval                        Foto Captured                                Manuals 10/22 11/5  10/10 10/15  10/17   Joint work Gentle cervical traction Gentle cervical traction  Gentle cervical traction  Gentle cervical traction   gentle cervical traction   ST R cervical paraspinal and articular pillar, ut/levator R cervical paraspinal and articular pillar, ut/levator  R cervical paraspinal and articular pillar R cervical paraspinal and articular pillar  R cervical paraspinal and articular pillar, UT/levator   flexibility                 assessment    assessment   Neuro Re-Ed            tband ext            tband rows                         Rhythmic stability                                                   Ther Ex            UBE/pulleys      5'  4' pulleys   Stick flex            Stick ER            Cervical ROT isometric 2x10 ea 2x10 ea  X10 ea X10 ea  x10 ea   Cervical retraction 2x10 2x10  x10 x10  2x10 wall                S/l ER            S/l abd             tband rows Blk tb  3x10 Gtb 3x10  Tavo tb 3x10 Tavo tb 3x10  tavo tb 3x10   Tband er Gtb 3x10 Gtb 3x10  Gtb 4x10 Gtb 4x10  gtb 4x10   Tband ir Gtb 3x10 Gtb 3x10  Gtb 4x10 Gtb 4x10  gtb 4x10   Tband bicep curls            Standing full cans            Tricep press down            Supine scap retraction 2-way            Prone rows            Prone ext            Prone horiz abd            Seated thoracic ROT - fixed cervical  X10 ea                             bicep            tricep            education Spent time reviewing HEP, provided tband as she will be traveling           Ther Activity                                      Modalities                                         Access Code: [email or web address]  URL: [email or web address]  Date: 08/14/2024  Prepared by: Shiva Melara     Exercises  - Sidelying Shoulder External Rotation  - 1 x daily - 7 x weekly - 3 sets - 10 reps  - Sidelying Shoulder Abduction Palm Forward  - 1 x daily - 7 x weekly - 3 sets - 10 reps  - Supine Shoulder Flexion Extension AAROM with Dowel  - 1 x daily - 7 x weekly - 3 sets - 10 reps  - Supine Shoulder External Rotation with Dowel  - 1 x daily - 7 x weekly - 8 reps - 10 hold  - Standing Shoulder Row with Anchored Resistance  - 1 x daily - 7 x weekly - 3 sets - 10 reps

## 2024-11-08 ENCOUNTER — PREP FOR PROCEDURE (OUTPATIENT)
Dept: PAIN MEDICINE | Facility: CLINIC | Age: 74
End: 2024-11-08

## 2024-11-08 ENCOUNTER — CONSULT (OUTPATIENT)
Dept: PAIN MEDICINE | Facility: CLINIC | Age: 74
End: 2024-11-08
Payer: COMMERCIAL

## 2024-11-08 VITALS
HEIGHT: 64 IN | BODY MASS INDEX: 29.19 KG/M2 | WEIGHT: 171 LBS | TEMPERATURE: 97.8 F | SYSTOLIC BLOOD PRESSURE: 130 MMHG | RESPIRATION RATE: 18 BRPM | DIASTOLIC BLOOD PRESSURE: 86 MMHG | HEART RATE: 60 BPM | OXYGEN SATURATION: 97 %

## 2024-11-08 DIAGNOSIS — M54.12 CERVICAL RADICULOPATHY: Primary | ICD-10-CM

## 2024-11-08 DIAGNOSIS — M47.12 OSTEOARTHRITIS OF CERVICAL SPINE WITH MYELOPATHY: ICD-10-CM

## 2024-11-08 DIAGNOSIS — M47.22 OTHER SPONDYLOSIS WITH RADICULOPATHY, CERVICAL REGION: Primary | ICD-10-CM

## 2024-11-08 PROCEDURE — 99204 OFFICE O/P NEW MOD 45 MIN: CPT | Performed by: ANESTHESIOLOGY

## 2024-11-08 PROCEDURE — G2211 COMPLEX E/M VISIT ADD ON: HCPCS | Performed by: ANESTHESIOLOGY

## 2024-11-08 RX ORDER — GABAPENTIN 300 MG/1
300 CAPSULE ORAL 3 TIMES DAILY
Qty: 90 CAPSULE | Refills: 2 | Status: SHIPPED | OUTPATIENT
Start: 2024-11-08

## 2024-11-08 NOTE — PROGRESS NOTES
Assessment  1. Other spondylosis with radiculopathy, cervical region  2. Osteoarthritis of cervical spine with myelopathy  -     Ambulatory referral to Spine & Pain Management    Right sided cervical radicular pain in C6 dermatomal distribution accompanied by pain limited weakness numbness and paresthesias.  The patient has been participant with physical therapy efforts. Chronic pain with decreased participation with IADLs over the past few years.  Has been taking NSAIDs and tramadol infrequently with modest benefit.  5/5 strength bilaterally in upper extremities with AROM, positive spurling's maneuver, right sided.  Additionally there is positive cervical facet loading eliciting pain, left greater than right. Negative Lewis's, denies any gait instability, saddle anesthesia. On MRI at C5-C6 there is disc desiccation and loss of disc height. Annular bulging and uncinate joint hypertrophic change. These changes are more prominent on the right. There is mild canal stenosis without cord compression. Mild bilateral right foraminal narrowing..  Risks, benefits alternatives to epidural steroid injections thoroughly discussed with patient.  Handouts provided questions answered to patient's satisfaction.  Lifestyle modifications extensively discussed including sleep and neck posture, diet, exercise and weight loss in conjunction with PT.  Will proceed with multimodal pain therapy plan as noted below:    Plan  -C7-T1 LESI; f/u 2 weeks post procedure  -gabapentin 300 mg t.i.d. Ordered for patient; counseled regarding sedative effects of taking this medication and provided up titration calendar.  Counseled not to take medication while driving or operating heavy machinery/using stairs  -continue formal physical therapy for cervical radiculopathy; Physician directed home exercise plan as per [employer] demonstrated and handouts provided that patient plans to participate with for 1 hour, twice a week for the next 6 weeks.      There are risks associated with opioid medications, including dependence, addiction and tolerance. The patient understands and agrees to use these medications only as prescribed. Potential side effects of the medications include, but are not limited to, constipation, drowsiness, addiction, impaired judgment and risk of fatal overdose if not taken as prescribed. The patient was warned against driving while taking sedation medications or operating heavy machinery. The patient voiced understanding. Sharing medications is a felony. At this point in time, the patient is showing no signs of addiction, abuse, diversion or suicidal ideation.     [place] Prescription Drug Monitoring Program report was reviewed and was appropriate      Complete risks and benefits including bleeding, infection, tissue reaction, nerve injury and allergic reaction were discussed. The approach was demonstrated using models and literature was provided. Verbal and written consent was obtained.     My impressions and treatment recommendations were discussed in detail with the patient who verbalized understanding and had no further questions.  Discharge instructions were provided. I personally saw and examined the patient and I agree with the above discussed plan of care.    Review of external notes including PT notes, PCP notes and specialist notes was performed at this visit in addition to review of new ordered imaging and past imaging to develop or modify multidisciplinary pain plan    No orders of the defined types were placed in this encounter.      History of Present Illness    Astrid Scott is a 74 y.o. female with pmhx of GERD, HTN presenting with subacute-sided neck pain described primarily as radicular nature.  The pain radiates in the right C6 dermatomal distributions and is primarily right-sided.  The pain contributes to significant disability in participation with independent activities of daily living and is accompanied by  weakness numbness and paresthesias that are debilitating in nature.  The patient describes that overhead maneuvers such as combing hair is significantly limiting with respect to strength in the right arm/hand. The patient notes significant weakness with right hand  as well. The patient has been to physical therapy with modest benefit. She has trialed conservative measures including nsaids, tylenol with modest relief of the pain but has not trialed any steroids.  She has never had interventional pain procedures in the past including any cervical interlaminar epidural steroid injections in the past for this pain. Denies any gait abnormality, saddle anesthesia or bowel/bladder abnormality.    I have personally reviewed and/or updated the patient's past medical history, past surgical history, family history, social history, current medications, allergies, and vital signs today.     Review of Systems   Constitutional:  Positive for activity change.   HENT: Negative.     Eyes: Negative.    Respiratory: Negative.     Cardiovascular: Negative.    Gastrointestinal: Negative.    Endocrine: Negative.    Genitourinary: Negative.    Musculoskeletal:  Positive for arthralgias, myalgias, neck pain and neck stiffness. Negative for back pain and gait problem.   Skin: Negative.    Allergic/Immunologic: Negative.    Neurological:  Positive for weakness and numbness.   Hematological: Negative.    Psychiatric/Behavioral: Negative.     All other systems reviewed and are negative.      Patient Active Problem List   Diagnosis    Essential hypertension    Gastro-esophageal reflux disease with esophagitis    Seasonal allergic rhinitis due to pollen    Presence of right artificial hip joint    Overweight (BMI 25.0-29.9)    New onset headache       Past Medical History:   Diagnosis Date    Allergic     SeAsonal    Essential hypertension     GERD (gastroesophageal reflux disease)     Hyperlipidemia     Hypertension     Skin cancer     1991        Past Surgical History:   Procedure Laterality Date    BASAL CELL CARCINOMA EXCISION      BREAST EXCISIONAL BIOPSY Left     Benign- 1991    BREAST SURGERY  1993    Breast biopsy    FRACTURE SURGERY  12/9/2019    Partial hip replacement    HERNIA REPAIR      HIP SURGERY      [place] HEMIARTHROPLASTY HIP PARTIAL Right 12/09/2019    Procedure: HEMIARTHROPLASTY HIP (BIPOLAR);  Surgeon: Jun oGmez;  Location: [facility];  Service: Orthopedics       Family History   Problem Relation Age of Onset    Colon cancer Paternal Grandmother     Diabetes Mother     Hypertension Mother     Coronary artery disease Mother     Heart disease Mother     Coronary artery disease Father     Hearing loss Father     Vision loss Father     Completed Suicide  Father     No Known Problems Sister     No Known Problems Daughter     Stroke Maternal Grandmother     No Known Problems Maternal Grandfather     Lung cancer Paternal Grandfather     Cancer Paternal Grandfather     No Known Problems Daughter     Lung cancer Paternal Aunt     No Known Problems Paternal Aunt        Social History     Occupational History    Not on file   Tobacco Use    Smoking status: Never     Passive exposure: Never    Smokeless tobacco: Never   Vaping Use    Vaping status: Never Used   Substance and Sexual Activity    Alcohol use: Yes     Comment: Occasionally     Drug use: Never    Sexual activity: Not Currently     Partners: Male     Birth control/protection: Post-menopausal       Current Outpatient Medications on File Prior to Visit   Medication Sig    Ascorbic Acid (vitamin C) 100 MG tablet Take 100 mg by mouth daily    calcium carbonate-vitamin D (OSCAL-D) 500 mg-200 units per tablet Take 1 tablet by mouth daily    magnesium (MAGTAB) 84 MG (7MEQ) TBCR Take 84 mg by mouth daily    Misc Natural Products (GLUCOSAMINE CHOND COMPLEX/MSM PO) Take 1,500 mg by mouth    montelukast (SINGULAIR) 10 mg tablet TAKE 1 TABLET BY MOUTH EVERY DAY    omeprazole (PriLOSEC) 20 mg  "delayed release capsule Take 20 mg by mouth as needed    triamterene-hydrochlorothiazide (MAXZIDE-25) 37.5-25 mg per tablet TAKE 1 TABLET BY MOUTH EVERY DAY    Turmeric 500 MG CAPS Take by mouth    vitamin B-12 (VITAMIN B-12) 500 mcg tablet Take 500 mcg by mouth daily     No current facility-administered medications on file prior to visit.       Allergies   Allergen Reactions    Codeine Hives         Physical Exam    /86 (BP Location: Left arm, Patient Position: Sitting, Cuff Size: Adult)   Pulse 60   Temp 97.8 °F (36.6 °C)   Resp 18   Ht 5' 3.5\" (1.613 m)   Wt 77.6 kg (171 lb)   SpO2 97%   BMI 29.82 kg/m²     Constitutional: normal, well developed, well nourished, alert, in no distress and non-toxic and no overt pain behavior. and overweight  Eyes: anicteric  HEENT: grossly intact  Neck: supple, symmetric, trachea midline and no masses   Pulmonary:even and unlabored  Cardiovascular:No edema or pitting edema present  Skin:Normal without rashes or lesions and well hydrated  Psychiatric:Mood and affect appropriate  Neurologic:Cranial Nerves II-XII grossly intact Sensation grossly intact; no clonus negative gasca's. Reflexes 2+ and brisk. Spurling's maneuver positive right sided  Musculoskeletal:normal gait. 5/5 strength bilaterally with AROM in upper extremities. Significant pain with cervical facet loading bilaterally and with lateral spine rotation, ttp over cervical paraspinal muscles, right greater than left.  Imaging    MRI CERVICAL SPINE WITHOUT CONTRAST     INDICATION: M47.12: Other spondylosis with myelopathy, cervical region.     COMPARISON:  None.     TECHNIQUE:  Multiplanar, multisequence imaging of the cervical spine was performed. .        IMAGE QUALITY:  Diagnostic     FINDINGS:     ALIGNMENT:  Normal alignment of the cervical spine.  No compression fracture.  No subluxation.  No scoliosis.     MARROW SIGNAL: Endplate marrow degenerative change within the cervical endplates especially C3-4 " where there is significant marrow edema.     CERVICAL AND VISUALIZED THORACIC CORD:  Normal signal within the visualized cord.     PREVERTEBRAL AND PARASPINAL SOFT TISSUES:  Normal.     VISUALIZED POSTERIOR FOSSA:  The visualized posterior fossa demonstrates no abnormal signal.     CERVICAL DISC SPACES: At the C1-2 level, right greater than left there is hypertrophic osteophyte formation between the lateral masses of C1 and body of C2. No canal stenosis or cord compression. C1-C2 foraminal narrowing is present on the right with   compression of the C2 nerve.     C2-C3: Normal disc height and signal. Mild facet hypertrophic degenerative change. There is no canal stenosis. Mild left foraminal narrowing.     C3-C4: Disc desiccation and loss of disc height with mild annular bulging and mild endplate/uncinate joint hypertrophic degenerative change. Right greater than left facet hypertrophic degenerative change. There is mild canal stenosis without cord   compression. Moderate foraminal narrowing.     C4-C5: Slight loss of disc height with minor annular bulging and uncinate joint hypertrophic degenerative change. Mild canal stenosis and bilateral foraminal narrowing, left greater than right.     C5-C6: Disc desiccation and loss of disc height. Annular bulging and uncinate joint hypertrophic change. These changes are more prominent on the right. There is mild canal stenosis without cord compression. Mild bilateral right foraminal narrowing.     C6-C7: Loss of disc height with mild annular bulging. No disc herniation, canal stenosis or foraminal narrowing.     C7-T1: Normal disc height and signal. Mild annular bulging and facet degenerative change. No canal stenosis. Mild foraminal narrowing.     UPPER THORACIC DISC SPACES: Mild foraminal narrowing at the T1-2 level on the left secondary to uncinate hypertrophic change.     OTHER FINDINGS:  None.     IMPRESSION:     Cervical spondylitic degenerative change with mild canal  stenosis and mild to moderate foraminal narrowing. No cord compression or abnormal cord signal.

## 2024-11-08 NOTE — H&P (VIEW-ONLY)
Assessment  1. Other spondylosis with radiculopathy, cervical region  2. Osteoarthritis of cervical spine with myelopathy  -     Ambulatory referral to Spine & Pain Management    Right sided cervical radicular pain in C6 dermatomal distribution accompanied by pain limited weakness numbness and paresthesias.  The patient has been participant with physical therapy efforts. Chronic pain with decreased participation with IADLs over the past few years.  Has been taking NSAIDs and tramadol infrequently with modest benefit.  5/5 strength bilaterally in upper extremities with AROM, positive spurling's maneuver, right sided.  Additionally there is positive cervical facet loading eliciting pain, left greater than right. Negative Lewis's, denies any gait instability, saddle anesthesia. On MRI at C5-C6 there is disc desiccation and loss of disc height. Annular bulging and uncinate joint hypertrophic change. These changes are more prominent on the right. There is mild canal stenosis without cord compression. Mild bilateral right foraminal narrowing..  Risks, benefits alternatives to epidural steroid injections thoroughly discussed with patient.  Handouts provided questions answered to patient's satisfaction.  Lifestyle modifications extensively discussed including sleep and neck posture, diet, exercise and weight loss in conjunction with PT.  Will proceed with multimodal pain therapy plan as noted below:    Plan  -C7-T1 LESI; f/u 2 weeks post procedure  -gabapentin 300 mg t.i.d. Ordered for patient; counseled regarding sedative effects of taking this medication and provided up titration calendar.  Counseled not to take medication while driving or operating heavy machinery/using stairs  -continue formal physical therapy for cervical radiculopathy; Physician directed home exercise plan as per [employer] demonstrated and handouts provided that patient plans to participate with for 1 hour, twice a week for the next 6 weeks.      There are risks associated with opioid medications, including dependence, addiction and tolerance. The patient understands and agrees to use these medications only as prescribed. Potential side effects of the medications include, but are not limited to, constipation, drowsiness, addiction, impaired judgment and risk of fatal overdose if not taken as prescribed. The patient was warned against driving while taking sedation medications or operating heavy machinery. The patient voiced understanding. Sharing medications is a felony. At this point in time, the patient is showing no signs of addiction, abuse, diversion or suicidal ideation.     [place] Prescription Drug Monitoring Program report was reviewed and was appropriate      Complete risks and benefits including bleeding, infection, tissue reaction, nerve injury and allergic reaction were discussed. The approach was demonstrated using models and literature was provided. Verbal and written consent was obtained.     My impressions and treatment recommendations were discussed in detail with the patient who verbalized understanding and had no further questions.  Discharge instructions were provided. I personally saw and examined the patient and I agree with the above discussed plan of care.    Review of external notes including PT notes, PCP notes and specialist notes was performed at this visit in addition to review of new ordered imaging and past imaging to develop or modify multidisciplinary pain plan    No orders of the defined types were placed in this encounter.      History of Present Illness    Astrid Scott is a 74 y.o. female with pmhx of GERD, HTN presenting with subacute-sided neck pain described primarily as radicular nature.  The pain radiates in the right C6 dermatomal distributions and is primarily right-sided.  The pain contributes to significant disability in participation with independent activities of daily living and is accompanied by  weakness numbness and paresthesias that are debilitating in nature.  The patient describes that overhead maneuvers such as combing hair is significantly limiting with respect to strength in the right arm/hand. The patient notes significant weakness with right hand  as well. The patient has been to physical therapy with modest benefit. She has trialed conservative measures including nsaids, tylenol with modest relief of the pain but has not trialed any steroids.  She has never had interventional pain procedures in the past including any cervical interlaminar epidural steroid injections in the past for this pain. Denies any gait abnormality, saddle anesthesia or bowel/bladder abnormality.    I have personally reviewed and/or updated the patient's past medical history, past surgical history, family history, social history, current medications, allergies, and vital signs today.     Review of Systems   Constitutional:  Positive for activity change.   HENT: Negative.     Eyes: Negative.    Respiratory: Negative.     Cardiovascular: Negative.    Gastrointestinal: Negative.    Endocrine: Negative.    Genitourinary: Negative.    Musculoskeletal:  Positive for arthralgias, myalgias, neck pain and neck stiffness. Negative for back pain and gait problem.   Skin: Negative.    Allergic/Immunologic: Negative.    Neurological:  Positive for weakness and numbness.   Hematological: Negative.    Psychiatric/Behavioral: Negative.     All other systems reviewed and are negative.      Patient Active Problem List   Diagnosis    Essential hypertension    Gastro-esophageal reflux disease with esophagitis    Seasonal allergic rhinitis due to pollen    Presence of right artificial hip joint    Overweight (BMI 25.0-29.9)    New onset headache       Past Medical History:   Diagnosis Date    Allergic     SeAsonal    Essential hypertension     GERD (gastroesophageal reflux disease)     Hyperlipidemia     Hypertension     Skin cancer     1991        Past Surgical History:   Procedure Laterality Date    BASAL CELL CARCINOMA EXCISION      BREAST EXCISIONAL BIOPSY Left     Benign- 1991    BREAST SURGERY  1993    Breast biopsy    FRACTURE SURGERY  12/9/2019    Partial hip replacement    HERNIA REPAIR      HIP SURGERY      [place] HEMIARTHROPLASTY HIP PARTIAL Right 12/09/2019    Procedure: HEMIARTHROPLASTY HIP (BIPOLAR);  Surgeon: Jun Gomez;  Location: [facility];  Service: Orthopedics       Family History   Problem Relation Age of Onset    Colon cancer Paternal Grandmother     Diabetes Mother     Hypertension Mother     Coronary artery disease Mother     Heart disease Mother     Coronary artery disease Father     Hearing loss Father     Vision loss Father     Completed Suicide  Father     No Known Problems Sister     No Known Problems Daughter     Stroke Maternal Grandmother     No Known Problems Maternal Grandfather     Lung cancer Paternal Grandfather     Cancer Paternal Grandfather     No Known Problems Daughter     Lung cancer Paternal Aunt     No Known Problems Paternal Aunt        Social History     Occupational History    Not on file   Tobacco Use    Smoking status: Never     Passive exposure: Never    Smokeless tobacco: Never   Vaping Use    Vaping status: Never Used   Substance and Sexual Activity    Alcohol use: Yes     Comment: Occasionally     Drug use: Never    Sexual activity: Not Currently     Partners: Male     Birth control/protection: Post-menopausal       Current Outpatient Medications on File Prior to Visit   Medication Sig    Ascorbic Acid (vitamin C) 100 MG tablet Take 100 mg by mouth daily    calcium carbonate-vitamin D (OSCAL-D) 500 mg-200 units per tablet Take 1 tablet by mouth daily    magnesium (MAGTAB) 84 MG (7MEQ) TBCR Take 84 mg by mouth daily    Misc Natural Products (GLUCOSAMINE CHOND COMPLEX/MSM PO) Take 1,500 mg by mouth    montelukast (SINGULAIR) 10 mg tablet TAKE 1 TABLET BY MOUTH EVERY DAY    omeprazole (PriLOSEC) 20 mg  "delayed release capsule Take 20 mg by mouth as needed    triamterene-hydrochlorothiazide (MAXZIDE-25) 37.5-25 mg per tablet TAKE 1 TABLET BY MOUTH EVERY DAY    Turmeric 500 MG CAPS Take by mouth    vitamin B-12 (VITAMIN B-12) 500 mcg tablet Take 500 mcg by mouth daily     No current facility-administered medications on file prior to visit.       Allergies   Allergen Reactions    Codeine Hives         Physical Exam    /86 (BP Location: Left arm, Patient Position: Sitting, Cuff Size: Adult)   Pulse 60   Temp 97.8 °F (36.6 °C)   Resp 18   Ht 5' 3.5\" (1.613 m)   Wt 77.6 kg (171 lb)   SpO2 97%   BMI 29.82 kg/m²     Constitutional: normal, well developed, well nourished, alert, in no distress and non-toxic and no overt pain behavior. and overweight  Eyes: anicteric  HEENT: grossly intact  Neck: supple, symmetric, trachea midline and no masses   Pulmonary:even and unlabored  Cardiovascular:No edema or pitting edema present  Skin:Normal without rashes or lesions and well hydrated  Psychiatric:Mood and affect appropriate  Neurologic:Cranial Nerves II-XII grossly intact Sensation grossly intact; no clonus negative gasca's. Reflexes 2+ and brisk. Spurling's maneuver positive right sided  Musculoskeletal:normal gait. 5/5 strength bilaterally with AROM in upper extremities. Significant pain with cervical facet loading bilaterally and with lateral spine rotation, ttp over cervical paraspinal muscles, right greater than left.  Imaging    MRI CERVICAL SPINE WITHOUT CONTRAST     INDICATION: M47.12: Other spondylosis with myelopathy, cervical region.     COMPARISON:  None.     TECHNIQUE:  Multiplanar, multisequence imaging of the cervical spine was performed. .        IMAGE QUALITY:  Diagnostic     FINDINGS:     ALIGNMENT:  Normal alignment of the cervical spine.  No compression fracture.  No subluxation.  No scoliosis.     MARROW SIGNAL: Endplate marrow degenerative change within the cervical endplates especially C3-4 " where there is significant marrow edema.     CERVICAL AND VISUALIZED THORACIC CORD:  Normal signal within the visualized cord.     PREVERTEBRAL AND PARASPINAL SOFT TISSUES:  Normal.     VISUALIZED POSTERIOR FOSSA:  The visualized posterior fossa demonstrates no abnormal signal.     CERVICAL DISC SPACES: At the C1-2 level, right greater than left there is hypertrophic osteophyte formation between the lateral masses of C1 and body of C2. No canal stenosis or cord compression. C1-C2 foraminal narrowing is present on the right with   compression of the C2 nerve.     C2-C3: Normal disc height and signal. Mild facet hypertrophic degenerative change. There is no canal stenosis. Mild left foraminal narrowing.     C3-C4: Disc desiccation and loss of disc height with mild annular bulging and mild endplate/uncinate joint hypertrophic degenerative change. Right greater than left facet hypertrophic degenerative change. There is mild canal stenosis without cord   compression. Moderate foraminal narrowing.     C4-C5: Slight loss of disc height with minor annular bulging and uncinate joint hypertrophic degenerative change. Mild canal stenosis and bilateral foraminal narrowing, left greater than right.     C5-C6: Disc desiccation and loss of disc height. Annular bulging and uncinate joint hypertrophic change. These changes are more prominent on the right. There is mild canal stenosis without cord compression. Mild bilateral right foraminal narrowing.     C6-C7: Loss of disc height with mild annular bulging. No disc herniation, canal stenosis or foraminal narrowing.     C7-T1: Normal disc height and signal. Mild annular bulging and facet degenerative change. No canal stenosis. Mild foraminal narrowing.     UPPER THORACIC DISC SPACES: Mild foraminal narrowing at the T1-2 level on the left secondary to uncinate hypertrophic change.     OTHER FINDINGS:  None.     IMPRESSION:     Cervical spondylitic degenerative change with mild canal  stenosis and mild to moderate foraminal narrowing. No cord compression or abnormal cord signal.

## 2024-11-11 ENCOUNTER — HOSPITAL ENCOUNTER (OUTPATIENT)
Dept: RADIOLOGY | Facility: CLINIC | Age: 74
Discharge: HOME | End: 2024-11-11
Payer: COMMERCIAL

## 2024-11-11 ENCOUNTER — HOSPITAL ENCOUNTER (OUTPATIENT)
Dept: RADIOLOGY | Facility: CLINIC | Age: 74
End: 2024-11-11
Payer: COMMERCIAL

## 2024-11-11 VITALS — WEIGHT: 171 LBS | HEIGHT: 64 IN | BODY MASS INDEX: 29.19 KG/M2

## 2024-11-11 DIAGNOSIS — Z12.31 ENCOUNTER FOR SCREENING MAMMOGRAM FOR BREAST CANCER: ICD-10-CM

## 2024-11-11 PROCEDURE — 77067 SCR MAMMO BI INCL CAD: CPT

## 2024-11-11 PROCEDURE — 77063 BREAST TOMOSYNTHESIS BI: CPT

## 2024-11-12 ENCOUNTER — IMMUNIZATIONS (OUTPATIENT)
Dept: FAMILY MEDICINE CLINIC | Facility: CLINIC | Age: 74
End: 2024-11-12
Payer: COMMERCIAL

## 2024-11-12 ENCOUNTER — OFFICE VISIT (OUTPATIENT)
Dept: PHYSICAL THERAPY | Facility: CLINIC | Age: 74
End: 2024-11-12
Payer: COMMERCIAL

## 2024-11-12 DIAGNOSIS — Z23 ENCOUNTER FOR IMMUNIZATION: Primary | ICD-10-CM

## 2024-11-12 DIAGNOSIS — M75.81 TENDINITIS OF RIGHT ROTATOR CUFF: Primary | ICD-10-CM

## 2024-11-12 PROCEDURE — G0008 ADMIN INFLUENZA VIRUS VAC: HCPCS

## 2024-11-12 PROCEDURE — 97110 THERAPEUTIC EXERCISES: CPT | Performed by: PHYSICAL THERAPIST

## 2024-11-12 PROCEDURE — 90662 IIV NO PRSV INCREASED AG IM: CPT

## 2024-11-12 PROCEDURE — 97140 MANUAL THERAPY 1/> REGIONS: CPT | Performed by: PHYSICAL THERAPIST

## 2024-11-12 NOTE — PROGRESS NOTES
Astrid Scott is in the office today to receive high dose flu injection/vaccine. Pt handled the procedure well with no complaints and was able to leave the office in no distress.

## 2024-11-12 NOTE — PROGRESS NOTES
Daily Note     Today's date: 2024  Patient name: Astrid Scott  : 1950  MRN: [id number]  Referring provider: Jun Gomez DO  Dx:   Encounter Diagnosis     ICD-10-CM    1. Tendinitis of right rotator cuff  M75.81                      Subjective: still has the aching in the shoulder. Saw pain management for neck - will be getting epidural next week.       Objective: See treatment diary below      Assessment: while motion in shoulder is better, still getting achy. She has pain remaining terminal IR, flex, abd. Returned to [facility] work in addition to cervical. Tolerated treatment well. Patient would benefit from continued PT      Plan: Continue per plan of care.      Precautions: acute R shld pain    Daily Treatment Diary:      POC Expiration: 24  Compliance 10/15 10/17  10/22  11/5 11/12        Visit Number 11 12  13  14 15        Re-Eval                        Foto Captured                                Manuals 10/22 11/5 11/12      Joint work Gentle cervical traction Gentle cervical traction Gentle traction 8', R ROT MWM with overpressure       ST R cervical paraspinal and articular pillar, ut/levator R cervical paraspinal and articular pillar, ut/levator       flexibility             R shld low load IR stretch, inf mob gr 2-3      Neuro Re-Ed         tband ext         tband rows                   Rhythmic stability                                       Ther Ex         UBE/pulleys         Stick flex         Stick ER         Cervical ROT isometric 2x10 ea 2x10 ea 2x10 ea      Cervical retraction 2x10 2x10 2x10 ea                S/l ER         S/l abd          tband rows Blk tb 3x10 Gtb 3x10 2b 3x10      Tband er Gtb 3x10 Gtb 3x10 Gtb 3x12      Tband ir Gtb 3x10 Gtb 3x10 Gtb 3x12      Tband bicep curls         Standing full cans         Tricep press down         Supine scap retraction 2-way         Prone rows         Prone ext         Prone horiz abd         Seated thoracic ROT - fixed cervical   X10 ea X12 ea                         bicep         tricep         education Spent time reviewing HEP, provided tband as she will be traveling        Ther Activity                             Modalities                                Access Code: [email or web address]  URL: [email or web address]  Date: 08/14/2024  Prepared by: Shiva Melara     Exercises  - Sidelying Shoulder External Rotation  - 1 x daily - 7 x weekly - 3 sets - 10 reps  - Sidelying Shoulder Abduction Palm Forward  - 1 x daily - 7 x weekly - 3 sets - 10 reps  - Supine Shoulder Flexion Extension AAROM with Dowel  - 1 x daily - 7 x weekly - 3 sets - 10 reps  - Supine Shoulder External Rotation with Dowel  - 1 x daily - 7 x weekly - 8 reps - 10 hold  - Standing Shoulder Row with Anchored Resistance  - 1 x daily - 7 x weekly - 3 sets - 10 reps

## 2024-11-13 NOTE — PROGRESS NOTES
Daily Note     Today's date: 2024  Patient name: Astrid Scott  : 1950  MRN: [id number]  Referring provider: Jun Gomez DO  Dx:   Encounter Diagnosis     ICD-10-CM    1. Tendinitis of right rotator cuff  M75.81                      Subjective: The patient is scheduled for an injection next week in her neck.  She was reaching into her closet Tuesday evening, had increased pain in her arm which lasted until yesterday.      Objective: See treatment diary below      Assessment: Tolerated treatment well with no increase in pain noted during session.  Shoulder motion improving but still limited.  Patient demonstrated fatigue post treatment and would benefit from continued PT      Plan: Continue per plan of care.      Precautions: acute R shld pain    Daily Treatment Diary:      POC Expiration: 24  Compliance 10/15 10/17  10/22  11/5 11/12 11/14       Visit Number 11 12  13  14 15 16       Re-Eval                        Foto Captured                                Manuals 10/22 11/5 11/12 11/14     Joint work Gentle cervical traction Gentle cervical traction Gentle traction 8', R ROT MWM with overpressure  Gentle traction 8'       ST R cervical paraspinal and articular pillar, ut/levator R cervical paraspinal and articular pillar, ut/levator  R cervical paraspinal and articular pillar, UT/  Levator      flexibility             R shld low load IR stretch, inf mob gr 2-3 R shldr low load IR stretch, inf mob Gr 2-3     Neuro Re-Ed         tband ext         tband rows                   Rhythmic stability                                       Ther Ex         UBE/pulleys         Stick flex         Stick ER         Cervical ROT isometric 2x10 ea 2x10 ea 2x10 ea 2x10 ea     Cervical retraction 2x10 2x10 2x10 ea 2x10 ea               S/l ER         S/l abd         tband rows Blk tb 3x10 Gtb 3x10 2b 3x10 GTB 3x10     Tband er Gtb 3x10 Gtb 3x10 Gtb 3x12 GTB 3x12     Tband ir Gtb 3x10 Gtb 3x10 Gtb 3x12 GTB  3x12     Tband bicep curls         Standing full cans         Tricep press down         Supine scap retraction 2-way         Prone rows         Prone ext         Prone horiz abd         Seated thoracic ROT - fixed cervical  X10 ea X12 ea 12x ea                        bicep         tricep         education Spent time reviewing HEP, provided gabriele as she will be traveling        Ther Activity                             Modalities                                Access Code: [email or web address]  URL: [email or web address]  Date: 08/14/2024  Prepared by: Shiva Melara     Exercises  - Sidelying Shoulder External Rotation  - 1 x daily - 7 x weekly - 3 sets - 10 reps  - Sidelying Shoulder Abduction Palm Forward  - 1 x daily - 7 x weekly - 3 sets - 10 reps  - Supine Shoulder Flexion Extension AAROM with Dowel  - 1 x daily - 7 x weekly - 3 sets - 10 reps  - Supine Shoulder External Rotation with Dowel  - 1 x daily - 7 x weekly - 8 reps - 10 hold  - Standing Shoulder Row with Anchored Resistance  - 1 x daily - 7 x weekly - 3 sets - 10 reps

## 2024-11-14 ENCOUNTER — OFFICE VISIT (OUTPATIENT)
Dept: PHYSICAL THERAPY | Facility: CLINIC | Age: 74
End: 2024-11-14
Payer: COMMERCIAL

## 2024-11-14 DIAGNOSIS — M75.81 TENDINITIS OF RIGHT ROTATOR CUFF: Primary | ICD-10-CM

## 2024-11-14 PROCEDURE — 97140 MANUAL THERAPY 1/> REGIONS: CPT | Performed by: PHYSICAL THERAPIST

## 2024-11-14 PROCEDURE — 97110 THERAPEUTIC EXERCISES: CPT | Performed by: PHYSICAL THERAPIST

## 2024-11-19 ENCOUNTER — OFFICE VISIT (OUTPATIENT)
Dept: PHYSICAL THERAPY | Facility: CLINIC | Age: 74
End: 2024-11-19
Payer: COMMERCIAL

## 2024-11-19 ENCOUNTER — HOSPITAL ENCOUNTER (OUTPATIENT)
Dept: RADIOLOGY | Facility: CLINIC | Age: 74
Discharge: HOME | End: 2024-11-19
Payer: COMMERCIAL

## 2024-11-19 VITALS — HEIGHT: 62 IN | WEIGHT: 171.6 LBS | BODY MASS INDEX: 31.58 KG/M2

## 2024-11-19 DIAGNOSIS — Z78.0 POSTMENOPAUSAL: ICD-10-CM

## 2024-11-19 DIAGNOSIS — M75.81 TENDINITIS OF RIGHT ROTATOR CUFF: Primary | ICD-10-CM

## 2024-11-19 PROCEDURE — 97140 MANUAL THERAPY 1/> REGIONS: CPT | Performed by: PHYSICAL THERAPIST

## 2024-11-19 PROCEDURE — 97110 THERAPEUTIC EXERCISES: CPT | Performed by: PHYSICAL THERAPIST

## 2024-11-19 PROCEDURE — 77080 DXA BONE DENSITY AXIAL: CPT

## 2024-11-19 NOTE — PROGRESS NOTES
Daily Note     Today's date: 2024  Patient name: Astrid Scott  : 1950  MRN: [id number]  Referring provider: Jun Gomez DO  Dx:   Encounter Diagnosis     ICD-10-CM    1. Tendinitis of right rotator cuff  M75.81                      Subjective: up and down days. Sometimes not too sore, others it is. Often gets it when laying on side in bed. Gets epidural tmrw      Objective: See treatment diary below      Assessment: R ROT cervical remains restricted as does terminal shld flex and reach BB. Neck responds well to MT and has nearly symmetrical motion afterwards. Tolerated treatment fair. Patient would benefit from continued PT      Plan: assess response with epidural     Precautions: acute R shld pain    Daily Treatment Diary:      POC Expiration: 24  Compliance 10/15 10/17  10/22  11/5 11/12 11/14 11/19      Visit Number 11 12  13  14 15 16 17      Re-Eval                        Foto Captured                                Manuals 10/22 11/5 11/12 11/14 11/19    Joint work Gentle cervical traction Gentle cervical traction Gentle traction 8', R ROT MWM with overpressure  Gentle traction 8'   Traction 8', MWM R cervical ROT w/ contralat upglide overpressure    ST R cervical paraspinal and articular pillar, ut/levator R cervical paraspinal and articular pillar, ut/levator  R cervical paraspinal and articular pillar, UT/  Levator      flexibility             R shld low load IR stretch, inf mob gr 2-3 R shldr low load IR stretch, inf mob Gr 2-3 R shld inf capsule mob gr 2-3    Neuro Re-Ed         tband ext         tband rows                   Rhythmic stability                                       Ther Ex         UBE/pulleys     Pulleys 3'    Stick flex         Stick ER         Cervical ROT isometric 2x10 ea 2x10 ea 2x10 ea 2x10 ea 2x10 ea    Cervical retraction 2x10 2x10 2x10 ea 2x10 ea 2x10     no monies     Rtb x20    S/l ER         S/l abd         tband rows Blk tb 3x10 Gtb 3x10 2b 3x10  GTB 3x10 Blk tb 3x10    Tband er Gtb 3x10 Gtb 3x10 Gtb 3x12 GTB 3x12     Tband ir Gtb 3x10 Gtb 3x10 Gtb 3x12 GTB 3x12     Tband bicep curls         Standing full cans         Tricep press down         Supine scap retraction 2-way         Prone rows         Prone ext         Prone horiz abd         Seated thoracic ROT - fixed cervical  X10 ea X12 ea 12x ea                        bicep         tricep         education Spent time reviewing HEP, provided tband as she will be traveling        Ther Activity                             Modalities                                Access Code: [email or web address]  URL: [email or web address]  Date: 08/14/2024  Prepared by: Shiva Melara     Exercises  - Sidelying Shoulder External Rotation  - 1 x daily - 7 x weekly - 3 sets - 10 reps  - Sidelying Shoulder Abduction Palm Forward  - 1 x daily - 7 x weekly - 3 sets - 10 reps  - Supine Shoulder Flexion Extension AAROM with Dowel  - 1 x daily - 7 x weekly - 3 sets - 10 reps  - Supine Shoulder External Rotation with Dowel  - 1 x daily - 7 x weekly - 8 reps - 10 hold  - Standing Shoulder Row with Anchored Resistance  - 1 x daily - 7 x weekly - 3 sets - 10 reps

## 2024-11-20 ENCOUNTER — HOSPITAL ENCOUNTER (OUTPATIENT)
Dept: GASTROENTEROLOGY | Facility: HOSPITAL | Age: 74
Setting detail: OUTPATIENT SURGERY
Discharge: HOME | End: 2024-11-20
Attending: ANESTHESIOLOGY | Admitting: ANESTHESIOLOGY
Payer: COMMERCIAL

## 2024-11-20 VITALS
RESPIRATION RATE: 20 BRPM | HEART RATE: 59 BPM | BODY MASS INDEX: 31.47 KG/M2 | HEIGHT: 62 IN | TEMPERATURE: 97.3 F | DIASTOLIC BLOOD PRESSURE: 70 MMHG | WEIGHT: 171 LBS | OXYGEN SATURATION: 97 % | SYSTOLIC BLOOD PRESSURE: 144 MMHG

## 2024-11-20 DIAGNOSIS — M54.12 CERVICAL RADICULOPATHY: ICD-10-CM

## 2024-11-20 PROCEDURE — 62321 NJX INTERLAMINAR CRV/THRC: CPT | Performed by: ANESTHESIOLOGY

## 2024-11-20 RX ORDER — SODIUM CHLORIDE 9 MG/ML
INJECTION INTRAVENOUS AS NEEDED
Status: COMPLETED | OUTPATIENT
Start: 2024-11-20 | End: 2024-11-20

## 2024-11-20 RX ORDER — LIDOCAINE HYDROCHLORIDE 10 MG/ML
INJECTION, SOLUTION EPIDURAL; INFILTRATION; INTRACAUDAL; PERINEURAL AS NEEDED
Status: COMPLETED | OUTPATIENT
Start: 2024-11-20 | End: 2024-11-20

## 2024-11-20 RX ORDER — METHYLPREDNISOLONE ACETATE 80 MG/ML
INJECTION, SUSPENSION INTRA-ARTICULAR; INTRALESIONAL; INTRAMUSCULAR; SOFT TISSUE AS NEEDED
Status: COMPLETED | OUTPATIENT
Start: 2024-11-20 | End: 2024-11-20

## 2024-11-20 RX ADMIN — METHYLPREDNISOLONE ACETATE 80 MG: 80 INJECTION, SUSPENSION INTRA-ARTICULAR; INTRALESIONAL; INTRAMUSCULAR; SOFT TISSUE at 12:01

## 2024-11-20 RX ADMIN — IOHEXOL 1 ML: 240 INJECTION, SOLUTION INTRATHECAL; INTRAVASCULAR; INTRAVENOUS; ORAL at 12:01

## 2024-11-20 RX ADMIN — LIDOCAINE HYDROCHLORIDE 5 ML: 10 INJECTION, SOLUTION EPIDURAL; INFILTRATION; INTRACAUDAL; PERINEURAL at 12:01

## 2024-11-20 RX ADMIN — SODIUM CHLORIDE 3 ML: 9 INJECTION, SOLUTION INTRAVENOUS at 12:01

## 2024-11-20 NOTE — INTERVAL H&P NOTE
H&P reviewed. After examining the patient I find no changes in the patients condition since the H&P had been written.    Vitals:    11/20/24 1120   BP: 156/69   Pulse: 61   Resp: 18   Temp: 98.2 °F (36.8 °C)   SpO2: 98%

## 2024-11-20 NOTE — DISCHARGE INSTR - AVS FIRST PAGE
YOUR 2 WEEK FOLLOW UP HAS BEEN SCHEDULED; IF YOU WISH TO CHANGE THE FOLLOW UP, PLEASE CALL THE [facility] AT [place]: [phone]    EPIDURAL STEROID INJECTION DISCHARGE INSTRUCTIONS  WHAT YOU NEED TO KNOW:   An epidural steroid injection (AGUSTIN) is a procedure to inject steroid medicine into the epidural space. The epidural space is between your spinal cord and vertebrae. Steroids reduce inflammation and fluid buildup in your spine that may be causing pain. You may be given pain medicine along with the steroids.        DISCHARGE INSTRUCTIONS:   Call your local emergency number (911 in the [place]) if:   You have a seizure.    You have trouble moving your legs.    Seek care immediately if:   Blood soaks through your bandage.    You have a fever or chills, severe back pain, and the procedure area is sensitive to the touch.    You cannot control when you urinate or have a bowel movement.    Call your doctor if:   You have weakness or numbness in your legs.    Your wound is red, swollen, or draining pus.    You have nausea or are vomiting.    Your face or neck is red and you feel warm.    You have more pain than you had before the procedure.    You have swelling in your hands or feet.    You have questions or concerns about your condition or care.    Care for your wound as directed:  You may remove the bandage before you go to bed the day of your procedure. You may take a shower, but do not take a bath for at least 24 hours.   Self-care:   Do not drive,  use machines, or do strenuous activity for 24 hours after your procedure or as directed.     Continue other treatments  as directed. Steroid injections alone will not control your pain. The injections are meant to be used with other treatments, such as physical therapy.    Follow up with your doctor as directed:  Write down your questions so you remember to ask them during your visits.           ACTIVITY  Do not drive or operate machinery today.  No strenuous  activity today - bending, lifting, etc.   You may resume normal activities starting tomorrow - start slowly and as tolerated.  You may shower today, but not tub baths or hot tubs.  You may have numbness for several hours from the local anesthetics. Please use caution and common sense, especially with weight-bearing activities.    CARE OF THE INJECTION SITE  If you have soreness or pain apply ice to the area today (20 minutes on and 20 minutes off).  Starting tomorrow, you may resume normal activities  Notify the [facility] if you have any of the following: redness, drainage, swelling or fever above 100°F.      MEDICATIONS  Continue to take all routine medications.  Our office may have instructed you to hold some medications. You may restart medications, including blood thinners.

## 2024-11-21 ENCOUNTER — OFFICE VISIT (OUTPATIENT)
Dept: PHYSICAL THERAPY | Facility: CLINIC | Age: 74
End: 2024-11-21
Payer: COMMERCIAL

## 2024-11-21 DIAGNOSIS — M75.81 TENDINITIS OF RIGHT ROTATOR CUFF: Primary | ICD-10-CM

## 2024-11-21 PROCEDURE — 97110 THERAPEUTIC EXERCISES: CPT | Performed by: PHYSICAL THERAPIST

## 2024-11-21 PROCEDURE — 97140 MANUAL THERAPY 1/> REGIONS: CPT | Performed by: PHYSICAL THERAPIST

## 2024-11-21 NOTE — PROGRESS NOTES
Daily Note     Today's date: 2024  Patient name: Astrid Scott  : 1950  MRN: [id number]  Referring provider: Jun Gomez DO  Dx:   Encounter Diagnosis     ICD-10-CM    1. Tendinitis of right rotator cuff  M75.81                      Subjective: feeling really good today both neck and shoulder. She had epidural yesterday but noted even before that she felt really good. Felt the upper cervical ST really helped      Objective: See treatment diary below      Assessment: improved R ROT to start session today (55 deg). Continued with upper cervical ST. Added low load shld IR stretching for post capsule due to limitation with reaching BB. Tolerated treatment well. Patient would benefit from continued PT      Plan: Continue per plan of care.      Precautions: acute R shld pain    Daily Treatment Diary:      POC Expiration: 24  Compliance 10/15 10/17  10/22  11/5 11/12 11/14 11/19 11/21     Visit Number 11 12  13  14 15 16 17 18     Re-Eval                        Foto Captured                                Manuals 10/22 11/5 11/12 11/14 11/19 11/21   Joint work Gentle cervical traction Gentle cervical traction Gentle traction 8', R ROT MWM with overpressure  Gentle traction 8'   Traction 8', MWM R cervical ROT w/ contralat upglide overpressure Traction 8',    ST R cervical paraspinal and articular pillar, ut/levator R cervical paraspinal and articular pillar, ut/levator  R cervical paraspinal and articular pillar, UT/  Levator   Sub-occipitals   flexibility             R shld low load IR stretch, inf mob gr 2-3 R shldr low load IR stretch, inf mob Gr 2-3 R shld inf capsule mob gr 2-3 R shld inf capsule mob gr 2-3 and post capsule IR low load stretching   Neuro Re-Ed         tband ext         tband rows                   Rhythmic stability                                       Ther Ex         UBE/pulleys     Pulleys 3'    Stick flex         Stick ER         Cervical ROT isometric 2x10 ea 2x10 ea  2x10 ea 2x10 ea 2x10 ea 2x10 ea   Cervical retraction 2x10 2x10 2x10 ea 2x10 ea 2x10 2x10    no monies     Rtb x20 Rtb 2x15   S/l ER         S/l abd         tband rows Blk tb 3x10 Gtb 3x10 2b 3x10 GTB 3x10 Blk tb 3x10 Blk tb 3x10   Tband er Gtb 3x10 Gtb 3x10 Gtb 3x12 GTB 3x12     Tband ir Gtb 3x10 Gtb 3x10 Gtb 3x12 GTB 3x12     Tband bicep curls         Standing full cans         Tricep press down         Supine scap retraction 2-way         Prone rows         Prone ext         Prone horiz abd         Seated thoracic ROT - fixed cervical  X10 ea X12 ea 12x ea                        bicep         tricep         education Spent time reviewing HEP, provided tband as she will be traveling        Ther Activity                             Modalities                                Access Code: [email or web address]  URL: [email or web address]  Date: 08/14/2024  Prepared by: Shiva Melara     Exercises  - Sidelying Shoulder External Rotation  - 1 x daily - 7 x weekly - 3 sets - 10 reps  - Sidelying Shoulder Abduction Palm Forward  - 1 x daily - 7 x weekly - 3 sets - 10 reps  - Supine Shoulder Flexion Extension AAROM with Dowel  - 1 x daily - 7 x weekly - 3 sets - 10 reps  - Supine Shoulder External Rotation with Dowel  - 1 x daily - 7 x weekly - 8 reps - 10 hold  - Standing Shoulder Row with Anchored Resistance  - 1 x daily - 7 x weekly - 3 sets - 10 reps

## 2024-11-25 ENCOUNTER — RESULTS FOLLOW-UP (OUTPATIENT)
Dept: FAMILY MEDICINE CLINIC | Facility: CLINIC | Age: 74
End: 2024-11-25

## 2024-11-25 ENCOUNTER — OFFICE VISIT (OUTPATIENT)
Dept: PHYSICAL THERAPY | Facility: CLINIC | Age: 74
End: 2024-11-25
Payer: COMMERCIAL

## 2024-11-25 DIAGNOSIS — M75.81 TENDINITIS OF RIGHT ROTATOR CUFF: Primary | ICD-10-CM

## 2024-11-25 PROCEDURE — 97140 MANUAL THERAPY 1/> REGIONS: CPT | Performed by: PHYSICAL THERAPIST

## 2024-11-25 PROCEDURE — 97110 THERAPEUTIC EXERCISES: CPT | Performed by: PHYSICAL THERAPIST

## 2024-11-25 NOTE — PROGRESS NOTES
Daily Note and Discharge Summary    Today's date: 2024  Patient name: Astrid Scott  : 1950  MRN: [id number]  Referring provider: Jun Gomez DO  Dx:   Encounter Diagnosis     ICD-10-CM    1. Tendinitis of right rotator cuff  M75.81                      Subjective: feeling really good!      Objective: R ROT 45 deg pre, 50 deg post MT      Assessment: patient did well with recent injection and feels ready for DC. She is independent with her home program focused on both cervical and shoulder. Still has restriction reaching behind back but shoulder motion has improved since IE with other planes. Has good understanding of HEP.       Plan: discharged     Precautions: acute R shld pain    Daily Treatment Diary:      POC Expiration: 24  Compliance 10/15 10/17  10/22  11/5 11/12 11/14 11/19 11/21 11/25    Visit Number 11 12  13  14 15 16 17 18 19    Re-Eval                        Foto Captured                                Manuals    Joint work Traction 5'    Traction 8', MWM R cervical ROT w/ contralat upglide overpressure Traction 8',    ST Sub-occipital release      Sub-occipitals   flexibility               R shld inf capsule mob gr 2-3 R shld inf capsule mob gr 2-3 and post capsule IR low load stretching   Neuro Re-Ed         tband ext         tband rows                   Rhythmic stability                                       Ther Ex         UBE/pulleys     Pulleys 3'    Stick flex         Stick ER         Cervical ROT isometric 2x10 ea    2x10 ea 2x10 ea   Cervical retraction 2x10    2x10 2x10    no monies Rtb 2x15    Rtb x20 Rtb 2x15   S/l ER         S/l abd         tband rows Blk tb 3x10    Blk tb 3x10 Blk tb 3x10   Tband er         Tband ir         Tband bicep curls         Standing full cans         Tricep press down         Supine scap retraction 2-way         Prone rows         Prone ext         Prone horiz abd         Seated thoracic ROT - fixed cervical                             bicep         tricep         education         Ther Activity                             Modalities                                Access Code: [email or web address]  URL: [email or web address]  Date: 08/14/2024  Prepared by: Shiva Melara     Exercises  - Sidelying Shoulder External Rotation  - 1 x daily - 7 x weekly - 3 sets - 10 reps  - Sidelying Shoulder Abduction Palm Forward  - 1 x daily - 7 x weekly - 3 sets - 10 reps  - Supine Shoulder Flexion Extension AAROM with Dowel  - 1 x daily - 7 x weekly - 3 sets - 10 reps  - Supine Shoulder External Rotation with Dowel  - 1 x daily - 7 x weekly - 8 reps - 10 hold  - Standing Shoulder Row with Anchored Resistance  - 1 x daily - 7 x weekly - 3 sets - 10 reps    Cervical retraction, ROT isometrics, no monies

## 2024-12-03 ENCOUNTER — APPOINTMENT (OUTPATIENT)
Dept: LAB | Facility: HOSPITAL | Age: 74
End: 2024-12-03
Payer: COMMERCIAL

## 2024-12-03 ENCOUNTER — OFFICE VISIT (OUTPATIENT)
Dept: FAMILY MEDICINE CLINIC | Facility: CLINIC | Age: 74
End: 2024-12-03
Payer: COMMERCIAL

## 2024-12-03 ENCOUNTER — RESULTS FOLLOW-UP (OUTPATIENT)
Dept: FAMILY MEDICINE CLINIC | Facility: CLINIC | Age: 74
End: 2024-12-03

## 2024-12-03 VITALS
WEIGHT: 173.8 LBS | DIASTOLIC BLOOD PRESSURE: 70 MMHG | HEIGHT: 62 IN | BODY MASS INDEX: 31.98 KG/M2 | OXYGEN SATURATION: 99 % | HEART RATE: 58 BPM | SYSTOLIC BLOOD PRESSURE: 138 MMHG

## 2024-12-03 DIAGNOSIS — M10.9 PODAGRA: Primary | ICD-10-CM

## 2024-12-03 DIAGNOSIS — M10.9 PODAGRA: ICD-10-CM

## 2024-12-03 LAB — URATE SERPL-MCNC: 7 MG/DL (ref 2–7.5)

## 2024-12-03 PROCEDURE — G2211 COMPLEX E/M VISIT ADD ON: HCPCS | Performed by: INTERNAL MEDICINE

## 2024-12-03 PROCEDURE — 36415 COLL VENOUS BLD VENIPUNCTURE: CPT

## 2024-12-03 PROCEDURE — 99213 OFFICE O/P EST LOW 20 MIN: CPT | Performed by: INTERNAL MEDICINE

## 2024-12-03 PROCEDURE — 84550 ASSAY OF BLOOD/URIC ACID: CPT

## 2024-12-03 RX ORDER — IBUPROFEN 800 MG/1
800 TABLET, FILM COATED ORAL EVERY 8 HOURS
Qty: 60 TABLET | Refills: 1 | Status: SHIPPED | OUTPATIENT
Start: 2024-12-03

## 2024-12-03 NOTE — PROGRESS NOTES
Name: Astrid Scott      : 1950      MRN: [id number]  Encounter Provider: Zach Edouard MD  Encounter Date: 12/3/2024   [facility]  Assessment & Plan  1. Podagra.  The patient's symptoms are indicative of gout, particularly involving the first metatarsal phalangeal joint. She has been experiencing pain for the past two weeks, which is exacerbated by wearing closed-toed shoes. A uric acid level test will be conducted to confirm the diagnosis.  We initially were going to give her prednisone but she reports that she does not sleep well and she is on prednisone and preferred not to take this medication.  She is advised to take ibuprofen 800 mg three times daily for 2 to 3 days. If no improvement is observed after 3 days, a prednisone taper will be considered. She is also advised to try home remedies like cherry juice, which may help alleviate symptoms.      Follow-up  Return in 2 weeks for follow up.          History of Present Illness     History of Present Illness  The patient is a 74-year-old female who presents for evaluation of right foot pain.    She reports that her right foot pain began three years ago when she started wearing closed-toed shoes. She typically wears sandals, especially during her trips to [place] in January. The current episode started a couple of weeks ago, with the pain originating in her right big toe. The pain intensifies when she wears shoes, even wide ones, and is relieved when she removes them. She has tried icing the foot and taking extra strength Tylenol, which provided some relief. She also used a medicated cream for massage. She has been experiencing these episodes for four years, with the last two years being the most severe.    She recalls a similar episode on her left foot two years ago, which was less severe and resolved after a few weeks.    She has been on Maxzide since . She also mentions that she has difficulty  "sleeping when taking prednisone.    She had a Lyme disease diagnosis in 05/2024. She woke up one morning and had pain down her arm. She had an appointment to check her hip with Dr. Santana, so she had him look at it. He thought something was going on with her rotator cuff. She received two sessions of physical therapy, but it did not help. They checked her again and discovered there was something going on in her neck, so they referred her to Dr. Meneses. She had an epidural injection on 11/20/2024, which really helped a lot. Now, they think it was not her shoulder, but her neck. Her physical therapist said he is still not convinced that there is not something going on in her arm as well.  Dr. Meneses put her on gabapentin 300 mg. Since the injection, things have been pretty good and she also completed her physical therapy.     Review of Systems  Objective   /70 (BP Location: Left arm, Patient Position: Sitting, Cuff Size: Large)   Pulse 58   Ht 5' 2.25\" (1.581 m)   Wt 78.8 kg (173 lb 12.8 oz)   SpO2 99%   BMI 31.53 kg/m²     Physical Exam    Physical Exam  Constitutional:       General: She is not in acute distress.     Appearance: Normal appearance. She is not ill-appearing.   Musculoskeletal:      Comments: She has obvious edema of the dorsum of the right foot with erythema over the same.  There is some swelling of the right first MTP and she is exquisitely tender to palpation and flexion and extension of this joint.   Neurological:      Mental Status: She is alert.         "

## 2024-12-09 ENCOUNTER — RA CDI HCC (OUTPATIENT)
Dept: OTHER | Facility: HOSPITAL | Age: 74
End: 2024-12-09

## 2024-12-13 ENCOUNTER — OFFICE VISIT (OUTPATIENT)
Dept: PAIN MEDICINE | Facility: CLINIC | Age: 74
End: 2024-12-13
Payer: COMMERCIAL

## 2024-12-13 VITALS
TEMPERATURE: 98 F | RESPIRATION RATE: 18 BRPM | SYSTOLIC BLOOD PRESSURE: 140 MMHG | HEIGHT: 62 IN | WEIGHT: 173.6 LBS | DIASTOLIC BLOOD PRESSURE: 78 MMHG | HEART RATE: 72 BPM | BODY MASS INDEX: 31.94 KG/M2 | OXYGEN SATURATION: 95 %

## 2024-12-13 DIAGNOSIS — M47.22 OTHER SPONDYLOSIS WITH RADICULOPATHY, CERVICAL REGION: Primary | ICD-10-CM

## 2024-12-13 PROCEDURE — G2211 COMPLEX E/M VISIT ADD ON: HCPCS | Performed by: ANESTHESIOLOGY

## 2024-12-13 PROCEDURE — 99213 OFFICE O/P EST LOW 20 MIN: CPT | Performed by: ANESTHESIOLOGY

## 2024-12-13 NOTE — PROGRESS NOTES
Assessment  1. Other spondylosis with radiculopathy, cervical region    Greater than 90% relief of pain with improved ability to participate with IADLs after C7-T1 ILESI; using gabapentin BID without side effects and improvement with pain, participation with IADLs. Previously reported the following symptomatology:     Right sided cervical radicular pain in C6 dermatomal distribution accompanied by pain limited weakness numbness and paresthesias.  The patient has been participant with physical therapy efforts. Chronic pain with decreased participation with IADLs over the past few years.  Has been taking NSAIDs and tramadol infrequently with modest benefit.  5/5 strength bilaterally in upper extremities with AROM, positive spurling's maneuver, right sided.  Additionally there is positive cervical facet loading eliciting pain, left greater than right. Negative Lewis's, denies any gait instability, saddle anesthesia. On MRI at C5-C6 there is disc desiccation and loss of disc height. Annular bulging and uncinate joint hypertrophic change. These changes are more prominent on the right. There is mild canal stenosis without cord compression. Mild bilateral right foraminal narrowing..  Risks, benefits alternatives to epidural steroid injections thoroughly discussed with patient.  Handouts provided questions answered to patient's satisfaction.  Lifestyle modifications extensively discussed including sleep and neck posture, diet, exercise and weight loss in conjunction with PT.  Will proceed with multimodal pain therapy plan as noted below:    Plan  -f/u prn  -gabapentin 300 mg t.i.d. Ordered for patient; patient taking BID; counseled regarding sedative effects of taking this medication and provided up titration calendar.  Counseled not to take medication while driving or operating heavy machinery/using stairs  -continue formal physical therapy for cervical radiculopathy; Physician directed home exercise plan as per [facility]  demonstrated and handouts provided that patient plans to participate with for 1 hour, twice a week for the next 6 weeks.     There are risks associated with opioid medications, including dependence, addiction and tolerance. The patient understands and agrees to use these medications only as prescribed. Potential side effects of the medications include, but are not limited to, constipation, drowsiness, addiction, impaired judgment and risk of fatal overdose if not taken as prescribed. The patient was warned against driving while taking sedation medications or operating heavy machinery. The patient voiced understanding. Sharing medications is a felony. At this point in time, the patient is showing no signs of addiction, abuse, diversion or suicidal ideation.     [place] Prescription Drug Monitoring Program report was reviewed and was appropriate      Complete risks and benefits including bleeding, infection, tissue reaction, nerve injury and allergic reaction were discussed. The approach was demonstrated using models and literature was provided. Verbal and written consent was obtained.     My impressions and treatment recommendations were discussed in detail with the patient who verbalized understanding and had no further questions.  Discharge instructions were provided. I personally saw and examined the patient and I agree with the above discussed plan of care.    Review of external notes including PT notes, PCP notes and specialist notes was performed at this visit in addition to review of new ordered imaging and past imaging to develop or modify multidisciplinary pain plan    No orders of the defined types were placed in this encounter.      History of Present Illness    Astrid Scott is a 74 y.o. female with pmhx of GERD, HTN presenting with subacute-sided neck pain described primarily as radicular nature.  The pain radiates in the right C6 dermatomal distributions and is primarily right-sided.  The pain  contributes to significant disability in participation with independent activities of daily living and is accompanied by weakness numbness and paresthesias that are debilitating in nature.  The patient describes that overhead maneuvers such as combing hair is significantly limiting with respect to strength in the right arm/hand. The patient notes significant weakness with right hand  as well. The patient has been to physical therapy with modest benefit. She has trialed conservative measures including nsaids, tylenol with modest relief of the pain but has not trialed any steroids.  She has never had interventional pain procedures in the past including any cervical interlaminar epidural steroid injections in the past for this pain. Denies any gait abnormality, saddle anesthesia or bowel/bladder abnormality.    I have personally reviewed and/or updated the patient's past medical history, past surgical history, family history, social history, current medications, allergies, and vital signs today.     Review of Systems   Constitutional:  Positive for activity change.   HENT: Negative.     Eyes: Negative.    Respiratory: Negative.     Cardiovascular: Negative.    Gastrointestinal: Negative.    Endocrine: Negative.    Genitourinary: Negative.    Musculoskeletal:  Positive for arthralgias, myalgias, neck pain and neck stiffness. Negative for back pain and gait problem.   Skin: Negative.    Allergic/Immunologic: Negative.    Neurological:  Positive for weakness and numbness.   Hematological: Negative.    Psychiatric/Behavioral: Negative.     All other systems reviewed and are negative.      Patient Active Problem List   Diagnosis    Essential hypertension    Gastro-esophageal reflux disease with esophagitis    Seasonal allergic rhinitis due to pollen    Presence of right artificial hip joint    Overweight (BMI 25.0-29.9)    New onset headache       Past Medical History:   Diagnosis Date    Allergic     SeAsonal     Essential hypertension     GERD (gastroesophageal reflux disease)     Hyperlipidemia     Hypertension     Skin cancer     1991       Past Surgical History:   Procedure Laterality Date    BASAL CELL CARCINOMA EXCISION      BREAST EXCISIONAL BIOPSY Left     Benign- 1991    BREAST SURGERY  1993    Breast biopsy    FRACTURE SURGERY  12/9/2019    Partial hip replacement    HERNIA REPAIR      HIP SURGERY      IR SPINE AND PAIN PROCEDURE  11/20/2024    [place] HEMIARTHROPLASTY HIP PARTIAL Right 12/09/2019    Procedure: HEMIARTHROPLASTY HIP (BIPOLAR);  Surgeon: Jun Gomez;  Location: [facility];  Service: Orthopedics       Family History   Problem Relation Age of Onset    Colon cancer Paternal Grandmother     Diabetes Mother     Hypertension Mother     Coronary artery disease Mother     Heart disease Mother     Coronary artery disease Father     Hearing loss Father     Vision loss Father     Completed Suicide  Father     No Known Problems Sister     No Known Problems Daughter     Stroke Maternal Grandmother     No Known Problems Maternal Grandfather     Lung cancer Paternal Grandfather     Cancer Paternal Grandfather     No Known Problems Daughter     Lung cancer Paternal Aunt     No Known Problems Paternal Aunt        Social History     Occupational History    Not on file   Tobacco Use    Smoking status: Never     Passive exposure: Never    Smokeless tobacco: Never   Vaping Use    Vaping status: Never Used   Substance and Sexual Activity    Alcohol use: Yes     Comment: Occasionally     Drug use: Never    Sexual activity: Not Currently     Partners: Male     Birth control/protection: Post-menopausal       Current Outpatient Medications on File Prior to Visit   Medication Sig    Ascorbic Acid (vitamin C) 100 MG tablet Take 100 mg by mouth daily    calcium carbonate-vitamin D (OSCAL-D) 500 mg-200 units per tablet Take 1 tablet by mouth daily    gabapentin (NEURONTIN) 300 mg capsule Take 1 capsule (300 mg total) by mouth 3  (three) times a day (Patient taking differently: Take 300 mg by mouth 2 (two) times a day)    ibuprofen (MOTRIN) 800 mg tablet Take 1 tablet (800 mg total) by mouth every 8 (eight) hours (Patient taking differently: Take 800 mg by mouth once)    magnesium (MAGTAB) 84 MG (7MEQ) TBCR Take 84 mg by mouth daily    Misc Natural Products (GLUCOSAMINE CHOND COMPLEX/MSM PO) Take 1,500 mg by mouth    montelukast (SINGULAIR) 10 mg tablet TAKE 1 TABLET BY MOUTH EVERY DAY    omeprazole (PriLOSEC) 20 mg delayed release capsule Take 20 mg by mouth as needed    triamterene-hydrochlorothiazide (MAXZIDE-25) 37.5-25 mg per tablet TAKE 1 TABLET BY MOUTH EVERY DAY    Turmeric 500 MG CAPS Take by mouth    vitamin B-12 (VITAMIN B-12) 500 mcg tablet Take 500 mcg by mouth daily     No current facility-administered medications on file prior to visit.       Allergies   Allergen Reactions    Codeine Hives         Physical Exam    There were no vitals taken for this visit.    Constitutional: normal, well developed, well nourished, alert, in no distress and non-toxic and no overt pain behavior. and overweight  Eyes: anicteric  HEENT: grossly intact  Neck: supple, symmetric, trachea midline and no masses   Pulmonary:even and unlabored  Cardiovascular:No edema or pitting edema present  Skin:Normal without rashes or lesions and well hydrated  Psychiatric:Mood and affect appropriate  Neurologic:Cranial Nerves II-XII grossly intact Sensation grossly intact; no clonus negative gasca's. Reflexes 2+ and brisk. Spurling's maneuver positive right sided  Musculoskeletal:normal gait. 5/5 strength bilaterally with AROM in upper extremities. Significant pain with cervical facet loading bilaterally and with lateral spine rotation, ttp over cervical paraspinal muscles, right greater than left.  Imaging    MRI CERVICAL SPINE WITHOUT CONTRAST     INDICATION: M47.12: Other spondylosis with myelopathy, cervical region.     COMPARISON:  None.     TECHNIQUE:   Multiplanar, multisequence imaging of the cervical spine was performed. .        IMAGE QUALITY:  Diagnostic     FINDINGS:     ALIGNMENT:  Normal alignment of the cervical spine.  No compression fracture.  No subluxation.  No scoliosis.     MARROW SIGNAL: Endplate marrow degenerative change within the cervical endplates especially C3-4 where there is significant marrow edema.     CERVICAL AND VISUALIZED THORACIC CORD:  Normal signal within the visualized cord.     PREVERTEBRAL AND PARASPINAL SOFT TISSUES:  Normal.     VISUALIZED POSTERIOR FOSSA:  The visualized posterior fossa demonstrates no abnormal signal.     CERVICAL DISC SPACES: At the C1-2 level, right greater than left there is hypertrophic osteophyte formation between the lateral masses of C1 and body of C2. No canal stenosis or cord compression. C1-C2 foraminal narrowing is present on the right with   compression of the C2 nerve.     C2-C3: Normal disc height and signal. Mild facet hypertrophic degenerative change. There is no canal stenosis. Mild left foraminal narrowing.     C3-C4: Disc desiccation and loss of disc height with mild annular bulging and mild endplate/uncinate joint hypertrophic degenerative change. Right greater than left facet hypertrophic degenerative change. There is mild canal stenosis without cord   compression. Moderate foraminal narrowing.     C4-C5: Slight loss of disc height with minor annular bulging and uncinate joint hypertrophic degenerative change. Mild canal stenosis and bilateral foraminal narrowing, left greater than right.     C5-C6: Disc desiccation and loss of disc height. Annular bulging and uncinate joint hypertrophic change. These changes are more prominent on the right. There is mild canal stenosis without cord compression. Mild bilateral right foraminal narrowing.     C6-C7: Loss of disc height with mild annular bulging. No disc herniation, canal stenosis or foraminal narrowing.     C7-T1: Normal disc height and  signal. Mild annular bulging and facet degenerative change. No canal stenosis. Mild foraminal narrowing.     UPPER THORACIC DISC SPACES: Mild foraminal narrowing at the T1-2 level on the left secondary to uncinate hypertrophic change.     OTHER FINDINGS:  None.     IMPRESSION:     Cervical spondylitic degenerative change with mild canal stenosis and mild to moderate foraminal narrowing. No cord compression or abnormal cord signal.

## 2024-12-13 NOTE — PATIENT INSTRUCTIONS
Patient Education     Neck Pain Exercises   About this topic   The neck or cervical spine has 7 spinal bones that run from the base of your skull to the upper back. These spinal bones have discs in between them. Discs act as shock absorbers. Ligaments are strong bands of tissue that hold the bones together. Many muscles surround and attach on these bones. Nerves come off of the spinal cord and exit out of small spaces in between the spinal bones. You can have neck pain if any of these are injured or damaged. Exercises may help to make this problem better.  General   Before starting with a program, ask your doctor if you are healthy enough to do these exercises. Your doctor may have you work with a  or physical therapist to make a safe exercise program to meet your needs. You should not do the exercises if they cause sharp pains, if you feel dizzy, or if you have vision changes.  Stretching Exercises   Stretching exercises keep your muscles flexible. They also stop them from getting tight. Start by doing each of these stretches 2 to 3 times. In order for your body to make changes, you will need to hold these stretches for 20 to 30 seconds. Try to do the stretches 2 to 3 times each day. Do all exercises slowly.  Passive neck stretches:  Put your left hand on top of your head. Your other arm can be at your side or behind your back. Pull your head toward your left shoulder until you feel a gentle stretch on the right side of your neck. Repeat on the other side using your other hand.  Also, try this stretch by pulling in a diagonal direction. With your left hand on top of your head, pull your head down towards the direction of your left knee. You should feel this stretch toward the back on the right side of your neck. Repeat on the other side.  Active neck stretches:  Neck front-to-back motion ? Look down to the floor until you feel a stretch in the back of your neck. Hold. Next, look up to the ceiling until you  feel a stretch in the front of your neck. Hold.  Neck side-to-side motion ? Tilt your head to the side and bring your ear to your shoulder until you feel a stretch on the other side of your neck. Hold. Next, tilt your head to the other side until you feel a stretch. Hold.  Neck turning ? Turn only your head and look over your left shoulder until you feel stretching in the right side of your neck. Hold. Now turn only your head and look over your right shoulder until you feel a stretch in the left side of your neck. Hold.  Scalene stretches ? Grasp your head with the hand opposite the side you want to stretch. Pull your head to the side until you feel a stretch. Now, slowly turn your head so your chin is pointed upwards.  Chin tucks ? Stand straight or lie down on your back. Tuck your chin in and lengthen the back of your neck. Return to the starting position and repeat. It may help to stand up against a wall during this exercise. Try gently pushing your chin with two fingers while trying to flatten your neck against the wall. If you do this exercise lying down, try using a small rolled up washcloth under your neck. Push down into the washcloth when tucking in your chin.  Strengthening Exercises   Strengthening exercises keep your muscles firm and strong. Start by repeating each exercise 2 to 3 times. Work up to doing each exercise 10 times. Try to do the exercises 2 to 3 times each day. Hold each exercise for 3 to 5 seconds. Do all exercises slowly.  Shoulder blade squeezes ? Pinch your shoulder blades together on your upper back and hold 3 to 5 seconds. Relax.             What will the results be?   Less pain and stiffness  Better range of motion  Increased strength  Help you heal faster after an injury or surgery  Increase blood flow to a body part  Help you feel better and more relaxed  Give you more energy  More toned looking muscles  Better posture  Easier to do daily activities  Helpful tips   Stay active and  work out to keep your muscles strong and flexible.  Be sure you do not hold your breath when exercising. This can raise your blood pressure. If you tend to hold your breath, try counting out loud when exercising. If any exercise bothers you, stop right away.  Try swinging your arms at an easy pace for a few minutes to warm up your muscles. Do this again after exercising.  Doing exercises before a meal may be a good way to get into a routine.  Exercise may be slightly uncomfortable, but you should not have sharp pains. If you do get sharp pains, stop what you are doing. If the sharp pains continue, call your doctor.  Last Reviewed Date   2020-03-10  Consumer Information Use and Disclaimer   This generalized information is a limited summary of diagnosis, treatment, and/or medication information. It is not meant to be comprehensive and should be used as a tool to help the user understand and/or assess potential diagnostic and treatment options. It does NOT include all information about conditions, treatments, medications, side effects, or risks that may apply to a specific patient. It is not intended to be medical advice or a substitute for the medical advice, diagnosis, or treatment of a health care provider based on the health care provider's examination and assessment of a patient’s specific and unique circumstances. Patients must speak with a health care provider for complete information about their health, medical questions, and treatment options, including any risks or benefits regarding use of medications. This information does not endorse any treatments or medications as safe, effective, or approved for treating a specific patient. [employer] and its affiliates disclaim any warranty or liability relating to this information or the use thereof. The use of this information is governed by the Terms of Use, available at https://www.[employer] Energyuwer.com/en/know/clinical-effectiveness-terms   Copyright   Copyright ©  2024 [employer] and its affiliates and/or licensors. All rights reserved.

## 2024-12-16 ENCOUNTER — OFFICE VISIT (OUTPATIENT)
Dept: FAMILY MEDICINE CLINIC | Facility: CLINIC | Age: 74
End: 2024-12-16
Payer: COMMERCIAL

## 2024-12-16 ENCOUNTER — RESULTS FOLLOW-UP (OUTPATIENT)
Dept: FAMILY MEDICINE CLINIC | Facility: CLINIC | Age: 74
End: 2024-12-16

## 2024-12-16 ENCOUNTER — APPOINTMENT (OUTPATIENT)
Dept: RADIOLOGY | Facility: CLINIC | Age: 74
End: 2024-12-16
Payer: COMMERCIAL

## 2024-12-16 VITALS
WEIGHT: 177.25 LBS | TEMPERATURE: 97.2 F | SYSTOLIC BLOOD PRESSURE: 120 MMHG | BODY MASS INDEX: 32.62 KG/M2 | OXYGEN SATURATION: 98 % | HEIGHT: 62 IN | DIASTOLIC BLOOD PRESSURE: 74 MMHG | HEART RATE: 64 BPM

## 2024-12-16 DIAGNOSIS — M10.9 PODAGRA: ICD-10-CM

## 2024-12-16 DIAGNOSIS — M85.859 OSTEOPENIA OF NECK OF FEMUR, UNSPECIFIED LATERALITY: ICD-10-CM

## 2024-12-16 DIAGNOSIS — M10.9 PODAGRA: Primary | ICD-10-CM

## 2024-12-16 PROCEDURE — G2211 COMPLEX E/M VISIT ADD ON: HCPCS | Performed by: INTERNAL MEDICINE

## 2024-12-16 PROCEDURE — 99214 OFFICE O/P EST MOD 30 MIN: CPT | Performed by: INTERNAL MEDICINE

## 2024-12-16 PROCEDURE — 73660 X-RAY EXAM OF TOE(S): CPT

## 2024-12-16 NOTE — ASSESSMENT & PLAN NOTE
The DEXA scan results indicate osteopenia, suggesting an increased risk for osteoporosis. She is advised to continue her calcium supplementation at a dosage of 500 mg twice daily, in conjunction with vitamin D. Weight-bearing exercises, such as walking and lifting weights, are recommended to promote bone growth.

## 2024-12-16 NOTE — ASSESSMENT & PLAN NOTE
Symptoms suggest a diagnosis of podagra, commonly known as gout in the big toe. The uric acid level is within normal range at 7.0, but this does not rule out gout as not all patients with gout exhibit elevated uric acid levels. The possibility of pseudogout was also discussed. She will continue with ibuprofen 800 mg, to be taken 2 to 3 times daily with food as tolerated. An x-ray of the toe will be conducted today to rule out other potential issues. She will be informed of the x-ray results. If the condition persists, a referral to rheumatology will be considered.

## 2024-12-16 NOTE — PROGRESS NOTES
Name: Astrid Scott      : 1950      MRN: [id number]  Encounter Provider: Zach Edouard MD  Encounter Date: 2024   Encounter department: [facility]      Assessment & Plan  Podagra  Symptoms suggest a diagnosis of podagra, commonly known as gout in the big toe. The uric acid level is within normal range at 7.0, but this does not rule out gout as not all patients with gout exhibit elevated uric acid levels. The possibility of pseudogout was also discussed. She will continue with ibuprofen 800 mg, to be taken 2 to 3 times daily with food as tolerated. An x-ray of the toe will be conducted today to rule out other potential issues. She will be informed of the x-ray results. If the condition persists, a referral to rheumatology will be considered.  Osteopenia of neck of femur, unspecified laterality  The DEXA scan results indicate osteopenia, suggesting an increased risk for osteoporosis. She is advised to continue her calcium supplementation at a dosage of 500 mg twice daily, in conjunction with vitamin D. Weight-bearing exercises, such as walking and lifting weights, are recommended to promote bone growth.       History of Present Illness     History of Present Illness  The patient is a 74-year-old female who presents for evaluation of right big toe pain and osteopenia.    She reports an improvement in her right big toe condition since the last visit on 2024, but she continues to experience swelling and tenderness in the joint. The onset of her symptoms was approximately 2 weeks ago, with the swelling extending across the top of her foot. She has been wearing boots for comfort and notes that one boot appears to be pressing against the affected area, causing tenderness. She also reports tenderness when moving her toe up and down. She expresses reluctance to take prednisone due to its side effects, including restlessness and insomnia. She recalls similar episodes occurring  "over the past 3 years, with the most recent episode occurring 2 years ago. She is planning a trip to [place] on Wednesday and is hesitant to start prednisone before her departure. She also mentions frequent sandal wear due to her residence in [place] and questions if this could be contributing to her symptoms. She has been managing the pain with ibuprofen, initially taking 800 mg 3 times daily, which she reduced to twice daily yesterday due to increased activity during a family [employer] dinner. Despite the reduction in dosage, she reports that the medication has been effective in alleviating the pain.    She recently underwent a DEXA scan in 11/2024 and is seeking clarification on the results. She has been supplementing with calcium 500 mg once daily and maintains an active lifestyle, including walking and weight-bearing exercises. However, she had to discontinue her weight training due to an arm issue and is considering resuming with a lower weight.    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    MEDICATIONS  Current: ibuprofen, calcium        Objective   /74 (BP Location: Left arm, Patient Position: Sitting, Cuff Size: Large)   Pulse 64   Temp (!) 97.2 °F (36.2 °C) (Temporal)   Ht 5' 2.25\" (1.581 m)   Wt 80.4 kg (177 lb 4 oz)   SpO2 98%   BMI 32.16 kg/m²     Physical Exam    Physical Exam  Constitutional:       General: She is not in acute distress.     Appearance: Normal appearance. She is not ill-appearing.   Musculoskeletal:      Comments: Decreased swelling of right 1st MTP. Mild tenderness with dorsi/plantar flexion   Neurological:      Mental Status: She is alert.         "

## 2025-01-25 DIAGNOSIS — J30.1 SEASONAL ALLERGIC RHINITIS DUE TO POLLEN: Chronic | ICD-10-CM

## 2025-01-25 RX ORDER — MONTELUKAST SODIUM 10 MG/1
10 TABLET ORAL DAILY
Qty: 90 TABLET | Refills: 1 | Status: SHIPPED | OUTPATIENT
Start: 2025-01-25

## 2025-03-26 DIAGNOSIS — M47.22 OTHER SPONDYLOSIS WITH RADICULOPATHY, CERVICAL REGION: ICD-10-CM

## 2025-03-26 RX ORDER — GABAPENTIN 300 MG/1
300 CAPSULE ORAL 3 TIMES DAILY
Qty: 90 CAPSULE | Refills: 2 | Status: SHIPPED | OUTPATIENT
Start: 2025-03-26

## 2025-04-25 DIAGNOSIS — I10 ESSENTIAL HYPERTENSION: ICD-10-CM

## 2025-04-25 RX ORDER — TRIAMTERENE AND HYDROCHLOROTHIAZIDE 37.5; 25 MG/1; MG/1
1 TABLET ORAL DAILY
Qty: 90 TABLET | Refills: 0 | Status: SHIPPED | OUTPATIENT
Start: 2025-04-25

## 2025-05-30 ENCOUNTER — OFFICE VISIT (OUTPATIENT)
Dept: FAMILY MEDICINE CLINIC | Facility: CLINIC | Age: 75
End: 2025-05-30
Payer: COMMERCIAL

## 2025-05-30 VITALS
SYSTOLIC BLOOD PRESSURE: 122 MMHG | WEIGHT: 173 LBS | DIASTOLIC BLOOD PRESSURE: 68 MMHG | BODY MASS INDEX: 31.83 KG/M2 | HEIGHT: 62 IN | OXYGEN SATURATION: 98 % | HEART RATE: 78 BPM | TEMPERATURE: 98.1 F

## 2025-05-30 DIAGNOSIS — R09.81 SINUS CONGESTION: ICD-10-CM

## 2025-05-30 DIAGNOSIS — R05.1 ACUTE COUGH: ICD-10-CM

## 2025-05-30 DIAGNOSIS — R09.81 NASAL CONGESTION: Primary | ICD-10-CM

## 2025-05-30 PROCEDURE — 99213 OFFICE O/P EST LOW 20 MIN: CPT | Performed by: NURSE PRACTITIONER

## 2025-05-30 PROCEDURE — G2211 COMPLEX E/M VISIT ADD ON: HCPCS | Performed by: NURSE PRACTITIONER

## 2025-05-30 RX ORDER — AMOXICILLIN 500 MG/1
500 CAPSULE ORAL EVERY 12 HOURS SCHEDULED
Qty: 14 CAPSULE | Refills: 0 | Status: SHIPPED | OUTPATIENT
Start: 2025-05-30 | End: 2025-06-06

## 2025-05-30 NOTE — PROGRESS NOTES
"Name: Astrid Scott      : 1950      MRN: [id number]  Encounter Provider: YAIMA Pichardo  Encounter Date: 2025   [facility]  :  Assessment & Plan  Nasal congestion    Orders:  •  amoxicillin (AMOXIL) 500 mg capsule; Take 1 capsule (500 mg total) by mouth every 12 (twelve) hours for 7 days    Sinus congestion    Orders:  •  amoxicillin (AMOXIL) 500 mg capsule; Take 1 capsule (500 mg total) by mouth every 12 (twelve) hours for 7 days    Acute cough    Orders:  •  amoxicillin (AMOXIL) 500 mg capsule; Take 1 capsule (500 mg total) by mouth every 12 (twelve) hours for 7 days           History of Present Illness   Here for an acute visit - reports onset of nasal congestion starting 3 weeks ago with her allergies.  Reports it has not resolved and is draining down the back of her throat. She reports she is now coughing up green phlegm.      Sinusitis  Associated symptoms include congestion and sinus pressure.     Review of Systems   HENT:  Positive for congestion, sinus pressure and sinus pain.        Objective   /68 (BP Location: Left arm, Patient Position: Sitting, Cuff Size: Large)   Pulse 78   Temp 98.1 °F (36.7 °C) (Oral)   Ht 5' 2.25\" (1.581 m)   Wt 78.5 kg (173 lb)   SpO2 98%   BMI 31.39 kg/m²      Physical Exam  HENT:      Right Ear: A middle ear effusion is present.      Left Ear: A middle ear effusion is present.      Nose: Congestion present.   Pulmonary:      Effort: Pulmonary effort is normal.      Breath sounds: Normal breath sounds.     Neurological:      Mental Status: She is alert and oriented to person, place, and time.           "

## 2025-06-16 ENCOUNTER — RA CDI HCC (OUTPATIENT)
Dept: OTHER | Facility: HOSPITAL | Age: 75
End: 2025-06-16

## 2025-06-23 ENCOUNTER — TELEPHONE (OUTPATIENT)
Age: 75
End: 2025-06-23

## 2025-06-23 NOTE — TELEPHONE ENCOUNTER
Caller: Patient    Doctor/Office: Podiatry     Call regarding :  appointment     Call was transferred to: podiatry

## 2025-07-01 ENCOUNTER — APPOINTMENT (OUTPATIENT)
Dept: LAB | Facility: HOSPITAL | Age: 75
End: 2025-07-01
Payer: COMMERCIAL

## 2025-07-01 DIAGNOSIS — E78.49 OTHER HYPERLIPIDEMIA: ICD-10-CM

## 2025-07-01 DIAGNOSIS — I10 ESSENTIAL HYPERTENSION: ICD-10-CM

## 2025-07-01 LAB
ANION GAP SERPL CALCULATED.3IONS-SCNC: 6 MMOL/L (ref 4–13)
BUN SERPL-MCNC: 25 MG/DL (ref 5–25)
CALCIUM SERPL-MCNC: 9.5 MG/DL (ref 8.4–10.2)
CHLORIDE SERPL-SCNC: 103 MMOL/L (ref 96–108)
CHOLEST SERPL-MCNC: 176 MG/DL (ref ?–200)
CO2 SERPL-SCNC: 31 MMOL/L (ref 21–32)
CREAT SERPL-MCNC: 0.83 MG/DL (ref 0.6–1.3)
GFR SERPL CREATININE-BSD FRML MDRD: 70 ML/MIN/1.73SQ M
GLUCOSE P FAST SERPL-MCNC: 96 MG/DL (ref 65–99)
HDLC SERPL-MCNC: 54 MG/DL
LDLC SERPL CALC-MCNC: 104 MG/DL (ref 0–100)
NONHDLC SERPL-MCNC: 122 MG/DL
POTASSIUM SERPL-SCNC: 3.9 MMOL/L (ref 3.5–5.3)
SODIUM SERPL-SCNC: 140 MMOL/L (ref 135–147)
TRIGL SERPL-MCNC: 88 MG/DL (ref ?–150)

## 2025-07-01 PROCEDURE — 36415 COLL VENOUS BLD VENIPUNCTURE: CPT

## 2025-07-01 PROCEDURE — 80048 BASIC METABOLIC PNL TOTAL CA: CPT

## 2025-07-01 PROCEDURE — 80061 LIPID PANEL: CPT

## 2025-07-07 ENCOUNTER — OFFICE VISIT (OUTPATIENT)
Dept: FAMILY MEDICINE CLINIC | Facility: CLINIC | Age: 75
End: 2025-07-07
Payer: COMMERCIAL

## 2025-07-07 VITALS
HEART RATE: 58 BPM | SYSTOLIC BLOOD PRESSURE: 139 MMHG | WEIGHT: 174.8 LBS | BODY MASS INDEX: 32.17 KG/M2 | HEIGHT: 62 IN | DIASTOLIC BLOOD PRESSURE: 63 MMHG | OXYGEN SATURATION: 97 %

## 2025-07-07 DIAGNOSIS — Z00.00 MEDICARE ANNUAL WELLNESS VISIT, SUBSEQUENT: Primary | ICD-10-CM

## 2025-07-07 PROCEDURE — G0439 PPPS, SUBSEQ VISIT: HCPCS | Performed by: FAMILY MEDICINE

## 2025-07-07 NOTE — PROGRESS NOTES
Name: Astrid Scott      : 1950      MRN: [id number]  Encounter Provider: Robert Budinetz, MD  Encounter Date: 2025   Encounter department: [facility] PRIMARY CARE  :  Assessment & Plan  Medicare annual wellness visit, subsequent  Reviewed age-appropriate health maintenance and preventive care.              Preventive health issues were discussed with patient, and age appropriate screening tests were ordered as noted in patient's After Visit Summary. Personalized health advice and appropriate referrals for health education or preventive services given if needed, as noted in patient's After Visit Summary.    History of Present Illness     The 10-year ASCVD risk score (Vani FIGUEROA, et al., 2019) is: 15.4%    Values used to calculate the score:      Age: 75 years      Clincally relevant sex: Female      Is Non- : No      Diabetic: No      Tobacco smoker: No      Systolic Blood Pressure: 122 mmHg      Is BP treated: Yes      HDL Cholesterol: 54 mg/dL      Total Cholesterol: 176 mg/dL         Patient Care Team:  Robert Budinetz, MD as PCP - General (Family Medicine)    Review of Systems   Respiratory: Negative.     Cardiovascular: Negative.    Gastrointestinal: Negative.      Medical History Reviewed by provider this encounter:       Annual Wellness Visit Questionnaire   Astrid is here for her Subsequent Wellness visit. Last Medicare Wellness visit information reviewed, patient interviewed, no change since last AWV.     Health Risk Assessment:   Patient rates overall health as excellent. Patient feels that their physical health rating is same. Patient is very satisfied with their life. Eyesight was rated as same. Hearing was rated as same. Patient feels that their emotional and mental health rating is same. Patients states they are never, rarely angry. Patient states they are never, rarely unusually tired/fatigued. Pain experienced in the last 7 days has been none. Patient  states that she has experienced no weight loss or gain in last 6 months.     Depression Screening:   PHQ-2 Score: 0      Fall Risk Screening:   In the past year, patient has experienced: no history of falling in past year      Urinary Incontinence Screening:   Patient has not leaked urine accidently in the last six months.     Home Safety:  Patient does not have trouble with stairs inside or outside of their home. Patient has working smoke alarms and has working carbon monoxide detector. Home safety hazards include: none.     Nutrition:   Current diet is Regular.     Medications:   Patient is currently taking over-the-counter supplements. OTC medications include: see medication list. Patient is able to manage medications.     Activities of Daily Living (ADLs)/Instrumental Activities of Daily Living (IADLs):   Walk and transfer into and out of bed and chair?: Yes  Dress and groom yourself?: Yes    Bathe or shower yourself?: Yes    Feed yourself? Yes  Do your laundry/housekeeping?: Yes  Manage your money, pay your bills and track your expenses?: Yes  Make your own meals?: Yes    Do your own shopping?: Yes    Previous Hospitalizations:   Any hospitalizations or ED visits within the last 12 months?: No      Preventive Screenings      Cardiovascular Screening:    General: Screening Not Indicated and History Lipid Disorder      Diabetes Screening:     General: Screening Current      Colorectal Cancer Screening:     General: Screening Current      Breast Cancer Screening:     General: Screening Current      Cervical Cancer Screening:    General: Screening Not Indicated      Lung Cancer Screening:     General: Screening Not Indicated      Hepatitis C Screening:    General: Screening Current    Immunizations:  - Immunizations due: Prevnar 20 and Zoster (Shingrix)    Screening, Brief Intervention, and Referral to Treatment (SBIRT)     Screening  Typical number of drinks in a day: 1  Typical number of drinks in a week:  "2  Interpretation: Low risk drinking behavior.    Single Item Drug Screening:  How often have you used an illegal drug (including marijuana) or a prescription medication for non-medical reasons in the past year? never    Single Item Drug Screen Score: 0  Interpretation: Negative screen for possible drug use disorder    Social Drivers of Health     Financial Resource Strain: Low Risk  (5/23/2023)    Overall Financial Resource Strain (CARDIA)     Difficulty of Paying Living Expenses: Not hard at all   Food Insecurity: No Food Insecurity (7/7/2025)    Nursing - Inadequate Food Risk Classification     Worried About Running Out of Food in the Last Year: Never true     Ran Out of Food in the Last Year: Never true   Transportation Needs: No Transportation Needs (7/7/2025)    PRAPARE - Transportation     Lack of Transportation (Medical): No     Lack of Transportation (Non-Medical): No   Housing Stability: Low Risk  (7/7/2025)    Housing Stability Vital Sign     Unable to Pay for Housing in the Last Year: No     Number of Times Moved in the Last Year: 0     Homeless in the Last Year: No   Utilities: Not At Risk (7/7/2025)    [facility] Utilities     Threatened with loss of utilities: No     No results found.    Objective   /63 (BP Location: Left arm, Patient Position: Sitting, Cuff Size: Standard) Comment: 124/66 manually  Pulse 58   Ht 5' 2\" (1.575 m)   Wt 79.3 kg (174 lb 12.8 oz)   SpO2 97%   BMI 31.97 kg/m²     Physical Exam  Vitals and nursing note reviewed.   Constitutional:       General: She is not in acute distress.     Appearance: She is well-developed.   HENT:      Head: Normocephalic and atraumatic.     Eyes:      Conjunctiva/sclera: Conjunctivae normal.       Cardiovascular:      Rate and Rhythm: Normal rate and regular rhythm.      Heart sounds: No murmur heard.  Pulmonary:      Effort: Pulmonary effort is normal. No respiratory distress.      Breath sounds: Normal breath sounds.   Abdominal:      " Palpations: Abdomen is soft.      Tenderness: There is no abdominal tenderness.     Musculoskeletal:         General: No swelling.      Cervical back: Neck supple.     Skin:     General: Skin is warm and dry.      Capillary Refill: Capillary refill takes less than 2 seconds.     Neurological:      Mental Status: She is alert.     Psychiatric:         Mood and Affect: Mood normal.

## 2025-07-21 ENCOUNTER — HOSPITAL ENCOUNTER (OUTPATIENT)
Dept: RADIOLOGY | Facility: CLINIC | Age: 75
Discharge: HOME | End: 2025-07-21
Attending: STUDENT IN AN ORGANIZED HEALTH CARE EDUCATION/TRAINING PROGRAM
Payer: COMMERCIAL

## 2025-07-21 ENCOUNTER — OFFICE VISIT (OUTPATIENT)
Dept: PODIATRY | Age: 75
End: 2025-07-21
Payer: COMMERCIAL

## 2025-07-21 VITALS — WEIGHT: 172.8 LBS | BODY MASS INDEX: 31.8 KG/M2 | HEIGHT: 62 IN

## 2025-07-21 DIAGNOSIS — M47.9 ARTHRITIS OF BACK: ICD-10-CM

## 2025-07-21 DIAGNOSIS — M79.672 PAIN IN BOTH FEET: ICD-10-CM

## 2025-07-21 DIAGNOSIS — M79.671 PAIN IN BOTH FEET: ICD-10-CM

## 2025-07-21 DIAGNOSIS — M79.672 PAIN IN BOTH FEET: Primary | ICD-10-CM

## 2025-07-21 DIAGNOSIS — M79.671 PAIN IN BOTH FEET: Primary | ICD-10-CM

## 2025-07-21 PROCEDURE — 99204 OFFICE O/P NEW MOD 45 MIN: CPT | Performed by: STUDENT IN AN ORGANIZED HEALTH CARE EDUCATION/TRAINING PROGRAM

## 2025-07-21 PROCEDURE — 72100 X-RAY EXAM L-S SPINE 2/3 VWS: CPT

## 2025-07-21 PROCEDURE — 73630 X-RAY EXAM OF FOOT: CPT

## 2025-07-21 RX ORDER — MELOXICAM 7.5 MG/1
7.5 TABLET ORAL DAILY
Qty: 10 TABLET | Refills: 0 | Status: SHIPPED | OUTPATIENT
Start: 2025-07-21

## 2025-07-21 NOTE — PROGRESS NOTES
Name: Astrid Scott      : 1950      MRN: [id number]  Encounter Provider: Sabrina Cavazos DPM  Encounter Date: 2025   Encounter department: [facility] PODIATRY [place]  :  Assessment & Plan  Pain in both feet  - PCP note from 25 reviewed   - Pain mngmt note from 24 reviewed  - Ddx to B/L intermittent foot pain/swelling/paresthesias include functional hallux limitus, polyneuropathy, L5 and S1 nerve root compression, neurogenic intermittent claudication, venous insufficiency, degenerative changes (calcaneal spurs, arthrosis of the joints of the foot), and inflammatory conditions of the ligaments and fascia of the foot and ankle.   - I recommend stiff bottomed sneakers/shoes (ex Asics, Vionic, New balance, Hazel, etc) for daily use and Oofos, Hoka (recovery slides) for in home use. Soft and wide in toe box. Avoid leather or tight material shoes, mesh is best.  - I advised pt to obtain OTC Vasyli-Dananberg arch supports  - I rx'd short course of meloxicam today and discussed close monitoring for stomach bleed symptoms which she is to stop the medication immediately if she has such.  - RTC prn if pain, swelling returns.     Orders:    XR foot 3+ vw right; Future    XR foot 3+ vw left; Future    XR spine lumbar 2 or 3 views injury; Future    meloxicam (Mobic) 7.5 mg tablet; Take 1 tablet (7.5 mg total) by mouth daily    Arthritis of back  - seen on MRI 24. Lumbar back XR ordered to assess possibility of radiculopathy resulting in foot pain. Referral to spine/pain discussed prn (patient has no current back pain)  Orders:    XR spine lumbar 2 or 3 views injury; Future    Imaging Reviewed at this visit (I personally reviewed/independently interpreted images and reports in PACS)  XR right and left foot WB 6v 25: No acute osseous abnormalities noted. Elevated 1st metatarsal B/L.          History of Present Illness   HPI  Astrid Scott is a 75 y.o. female who presents to  "clinic for B/L foot pain.  Notes intermittent pain to B/L feet 1st toe joint. Notes when she starts wearing certain hard closed toed shoes up here (after sandals in [place]) she notes pain. Lives in [place] in winter and here in Summer. The pain/swelling normally resolves on own after she stops wearing the shoes.   Has had Xrs and gout per PCP. All was normal according to her. Was on high dose ibuprofen which helped.   This year she came back in May and wore slip on shoes. Notes the next day the 1st toe left side was painful, swollen and could not move the toes. The pain has resolved currently but some swelling and stiffness remains.    Some intermittent N/T/B. Has happened in both sides after the tight shoes.   Denies calf pain.  Can wear sneakers and sandals without issue.         Review of Systems   Constitutional:  Negative for activity change, chills and fever.   HENT: Negative.     Respiratory:  Negative for cough, chest tightness and shortness of breath.    Cardiovascular:  Negative for chest pain and leg swelling.   Endocrine: Negative.    Genitourinary: Negative.    Neurological: Negative.  Negative for numbness.   Psychiatric/Behavioral: Negative.  Negative for agitation and behavioral problems.           Objective   Ht 5' 2\" (1.575 m)   Wt 78.4 kg (172 lb 12.8 oz)   BMI 31.61 kg/m²      Physical Exam  Constitutional:       Appearance: Normal appearance.     Cardiovascular:      Pulses: Normal pulses.   Pulmonary:      Effort: Pulmonary effort is normal.     Musculoskeletal:         General: Swelling (slight left forefoot) and deformity (functional hallux limitus B/L) present. No tenderness or signs of injury.      Comments: B/L MTPJ, TMTJ, ankle ROM intact and without pain     Skin:     Findings: No erythema or lesion.     Neurological:      General: No focal deficit present.      Mental Status: She is alert.      Comments: - tinel at tarsal tunnel, dorsal feet.   Negative mulders click or metatarsal " compression.  No current N/T/B to B/L feet.

## 2025-07-21 NOTE — PATIENT INSTRUCTIONS
Nervive nerve supplement      Foot Pain Home Therapy    Wear supportive shoes at all times. Avoid flip-flops, flat sandals, barefoot walking (never walk barefoot, even at home). Generally avoid shoes that are too flexible and bend in the arch. Your shoes should only slightly bend in the toe area, not the middle. Running sneakers are often the best choice. Soft and wide in toe box  Supportive sneaker brands: Xelero, Hazel, Lucas, Mizuno, [place], Altra, [place]  [place] [employer]   Fleet Feet [place]  [facility]   Louin Lynk [place]  Supportive daily shoe brands: Vionic, Orthofeet, Deysi, Dansko, Chacos, Olivas, Teva, Birkenstock  Supportive home shoes: Moira Kim (recovery slides)  Purchase over the counter topical pain creams such as Voltarin gel, biofreeze, or CBD cream - will need to apply 2-3 times per day for benefit.   Look in to over the counter shoe inserts/arch supports such as Vasyli-Danmarianoberg (remove tabs under 1st toe) arch supports. These are all available on [employer].com as well as their individual website's.   BucketFeet: Vasyli+Dananberg 1st Ray Orthotic, Medium, 1st Ray Function, Medium Density, Full-Length Insole, Heat Molding Optional, Best All Around Orthotic, Functional Biomechanical Control for Pain Relief, Black Yellow (46989) : Health & Household   [employer]YLI [employer] [place] [employer]

## 2025-07-25 DIAGNOSIS — J30.1 SEASONAL ALLERGIC RHINITIS DUE TO POLLEN: Chronic | ICD-10-CM

## 2025-07-25 DIAGNOSIS — I10 ESSENTIAL HYPERTENSION: ICD-10-CM

## 2025-07-25 RX ORDER — TRIAMTERENE AND HYDROCHLOROTHIAZIDE 37.5; 25 MG/1; MG/1
1 TABLET ORAL DAILY
Qty: 90 TABLET | Refills: 1 | Status: SHIPPED | OUTPATIENT
Start: 2025-07-25

## 2025-07-25 RX ORDER — MONTELUKAST SODIUM 10 MG/1
10 TABLET ORAL DAILY
Qty: 90 TABLET | Refills: 1 | Status: SHIPPED | OUTPATIENT
Start: 2025-07-25

## 2025-07-27 DIAGNOSIS — M47.22 OTHER SPONDYLOSIS WITH RADICULOPATHY, CERVICAL REGION: ICD-10-CM

## 2025-08-04 RX ORDER — GABAPENTIN 300 MG/1
300 CAPSULE ORAL 3 TIMES DAILY
Qty: 90 CAPSULE | Refills: 2 | Status: SHIPPED | OUTPATIENT
Start: 2025-08-04

## 2025-08-06 PROBLEM — Z00.00 MEDICARE ANNUAL WELLNESS VISIT, SUBSEQUENT: Status: RESOLVED | Noted: 2025-07-07 | Resolved: 2025-08-06

## (undated) DEVICE — INTENDED FOR TISSUE SEPARATION, AND OTHER PROCEDURES THAT REQUIRE A SHARP SURGICAL BLADE TO PUNCTURE OR CUT.: Brand: BARD-PARKER SAFETY BLADES SIZE 10, STERILE

## (undated) DEVICE — DRESSING MEPILEX BORDER 4 X 8 IN

## (undated) DEVICE — GLOVE INDICATOR UNDERGLOVE SZ 7.5 GREEN

## (undated) DEVICE — DRESSING MEPILEX BORDER 4 X 12 IN

## (undated) DEVICE — NEEDLE SPINAL 18G X 3IN DISP

## (undated) DEVICE — GLOVE SRG BIOGEL 7.5

## (undated) DEVICE — 3M™ DURAPORE™ SURGICAL TAPE 1538-3, 3 INCH X 10 YARD (7,5CM X 9,1M), 4 ROLLS/BOX: Brand: 3M™ DURAPORE™

## (undated) DEVICE — CAPIT HIP UNIPOLAR HEAD POR PRIMARY

## (undated) DEVICE — ABDUCTION PILLOW FOAM POSITIONER: Brand: CARDINAL HEALTH

## (undated) DEVICE — CHLORAPREP HI-LITE 26ML ORANGE

## (undated) DEVICE — SUT VICRYL 1 CT-1 27 IN J261H

## (undated) DEVICE — PREP IM ENCHANCED TOTAL HIP BONE                                    PREPARATION KIT: Brand: PREP-IM

## (undated) DEVICE — ELECTRODE BLADE MOD E-Z CLEAN 4IN -0014AM

## (undated) DEVICE — PLUMEPEN PRO 10FT

## (undated) DEVICE — ADHESIVE SKIN HIGH VISCOSITY EXOFIN 1ML

## (undated) DEVICE — SUT VICRYL 2-0 CT-1 27 IN J259H

## (undated) DEVICE — CAPIT HIP STEM POR PRIMARY

## (undated) DEVICE — SYRINGE 20ML LL

## (undated) DEVICE — 3M™ IOBAN™ 2 ANTIMICROBIAL INCISE DRAPE 6650EZ: Brand: IOBAN™ 2

## (undated) DEVICE — STOCKINETTE,IMPERVIOUS,12X48,STERILE: Brand: MEDLINE

## (undated) DEVICE — TOWEL SURG XR DETECT GREEN STRL RFD

## (undated) DEVICE — SPONGE RAYTEX

## (undated) DEVICE — BETHLEHEM TOTAL HIP, KIT: Brand: CARDINAL HEALTH